# Patient Record
Sex: FEMALE | Race: WHITE | NOT HISPANIC OR LATINO | Employment: FULL TIME | URBAN - METROPOLITAN AREA
[De-identification: names, ages, dates, MRNs, and addresses within clinical notes are randomized per-mention and may not be internally consistent; named-entity substitution may affect disease eponyms.]

---

## 2017-07-19 ENCOUNTER — TRANSCRIBE ORDERS (OUTPATIENT)
Dept: ADMINISTRATIVE | Facility: HOSPITAL | Age: 27
End: 2017-07-19

## 2017-07-19 DIAGNOSIS — H90.3 SENSORY HEARING LOSS, BILATERAL: Primary | ICD-10-CM

## 2017-07-19 DIAGNOSIS — R42 DIZZINESS AND GIDDINESS: ICD-10-CM

## 2017-07-31 ENCOUNTER — HOSPITAL ENCOUNTER (OUTPATIENT)
Dept: RADIOLOGY | Facility: HOSPITAL | Age: 27
Discharge: HOME | End: 2017-07-31
Payer: COMMERCIAL

## 2017-07-31 DIAGNOSIS — R42 DIZZINESS AND GIDDINESS: ICD-10-CM

## 2017-07-31 DIAGNOSIS — H90.3 SENSORY HEARING LOSS, BILATERAL: ICD-10-CM

## 2017-07-31 PROCEDURE — A9585 GADOBUTROL INJECTION: HCPCS | Performed by: RADIOLOGY

## 2017-07-31 PROCEDURE — 70553 MRI BRAIN STEM W/O & W/DYE: CPT

## 2017-07-31 RX ADMIN — GADOBUTROL 10 ML: 604.72 INJECTION INTRAVENOUS at 20:00

## 2018-07-18 ENCOUNTER — HOSPITAL ENCOUNTER (EMERGENCY)
Facility: HOSPITAL | Age: 28
Discharge: HOME | End: 2018-07-18
Attending: EMERGENCY MEDICINE | Admitting: EMERGENCY MEDICINE
Payer: COMMERCIAL

## 2018-07-18 VITALS
TEMPERATURE: 98.5 F | SYSTOLIC BLOOD PRESSURE: 144 MMHG | RESPIRATION RATE: 17 BRPM | BODY MASS INDEX: 34.06 KG/M2 | DIASTOLIC BLOOD PRESSURE: 70 MMHG | WEIGHT: 217 LBS | OXYGEN SATURATION: 99 % | HEIGHT: 67 IN | HEART RATE: 81 BPM

## 2018-07-18 DIAGNOSIS — N75.1 BARTHOLIN'S GLAND ABSCESS: Primary | ICD-10-CM

## 2018-07-18 PROCEDURE — 99282 EMERGENCY DEPT VISIT SF MDM: CPT

## 2018-07-18 RX ORDER — CEPHALEXIN 250 MG/1
500 CAPSULE ORAL ONCE
Status: COMPLETED | OUTPATIENT
Start: 2018-07-18 | End: 2018-07-18

## 2018-07-18 RX ORDER — LIDOCAINE HYDROCHLORIDE 10 MG/ML
10 INJECTION, SOLUTION EPIDURAL; INFILTRATION; INTRACAUDAL; PERINEURAL ONCE
Status: DISCONTINUED | OUTPATIENT
Start: 2018-07-18 | End: 2018-07-18

## 2018-07-18 RX ORDER — OXYCODONE HYDROCHLORIDE AND ACETAMINOPHEN 5; 325 MG/1; MG/1
1 TABLET ORAL ONCE
Status: COMPLETED | OUTPATIENT
Start: 2018-07-18 | End: 2018-07-18

## 2018-07-18 RX ORDER — ONDANSETRON 4 MG/1
4 TABLET, ORALLY DISINTEGRATING ORAL ONCE
Status: COMPLETED | OUTPATIENT
Start: 2018-07-18 | End: 2018-07-18

## 2018-07-18 RX ORDER — CEPHALEXIN 500 MG/1
500 CAPSULE ORAL 4 TIMES DAILY
Qty: 40 CAPSULE | Refills: 0 | Status: SHIPPED | OUTPATIENT
Start: 2018-07-18 | End: 2018-07-28

## 2018-07-18 RX ORDER — OXYCODONE HYDROCHLORIDE AND ACETAMINOPHEN 5; 325 MG/1; MG/1
TABLET ORAL
Qty: 10 TABLET | Refills: 0 | Status: SHIPPED | OUTPATIENT
Start: 2018-07-18 | End: 2019-02-20 | Stop reason: ALTCHOICE

## 2018-07-18 RX ORDER — LIDOCAINE HYDROCHLORIDE AND EPINEPHRINE 10; 10 MG/ML; UG/ML
10 INJECTION, SOLUTION INFILTRATION; PERINEURAL ONCE
Status: COMPLETED | OUTPATIENT
Start: 2018-07-18 | End: 2018-07-18

## 2018-07-18 RX ADMIN — CEPHALEXIN 500 MG: 250 CAPSULE ORAL at 20:08

## 2018-07-18 RX ADMIN — LIDOCAINE HYDROCHLORIDE AND EPINEPHRINE 10 ML: 10; 10 INJECTION, SOLUTION INFILTRATION; PERINEURAL at 21:15

## 2018-07-18 RX ADMIN — OXYCODONE HYDROCHLORIDE AND ACETAMINOPHEN 1 TABLET: 5; 325 TABLET ORAL at 20:07

## 2018-07-18 RX ADMIN — ONDANSETRON 4 MG: 4 TABLET, ORALLY DISINTEGRATING ORAL at 20:08

## 2018-07-19 NOTE — ED NOTES
Discharged with instructions  Verbalized understanding  No distress at this time       Jerlean Burkitt, RN  07/18/18 8459

## 2018-07-19 NOTE — ED PROVIDER NOTES
History  Chief Complaint   Patient presents with    Abscess     swelling and abscess on left side of vagina  no drainage according to patient  This is a 41-year-old female patient who does have a history of a Bartholin's abscess that had an incision and drainage several years ago by her gynecologist the left side  For the last 3 days she has had increased pain and swelling over her left labia to the point now that she can tolerate the pain  She stated that earlier today she had chills but not sure of was due to the pain  She is currently on her menses  Nothing makes it better or worse she tried calling her gynecologist was given appointment for Tuesday but the pain is too severe  Patient has no other symptoms no difficulty voiding no burning with urination or vaginal discharge  None       No past medical history on file  No past surgical history on file  No family history on file  I have reviewed and agree with the history as documented  Social History   Substance Use Topics    Smoking status: Not on file    Smokeless tobacco: Not on file    Alcohol use Not on file        Review of Systems   All other systems reviewed and are negative  Physical Exam  Physical Exam   Constitutional: She appears well-developed and well-nourished  HENT:   Head: Normocephalic and atraumatic  Right Ear: External ear normal    Left Ear: External ear normal    Nose: Nose normal    Mouth/Throat: Oropharynx is clear and moist    Eyes: Conjunctivae are normal  Pupils are equal, round, and reactive to light  Neck: Normal range of motion  Neck supple  Cardiovascular: Normal rate and regular rhythm  Pulmonary/Chest: Effort normal and breath sounds normal    Abdominal: Soft  Bowel sounds are normal  There is no tenderness  Genitourinary:       Pelvic exam was performed with patient prone  Neurological: She is alert  Skin: Skin is warm  Psychiatric: She has a normal mood and affect   Her behavior is normal    Nursing note and vitals reviewed  Vital Signs  ED Triage Vitals [07/18/18 1943]   Temperature Pulse Respirations Blood Pressure SpO2   98 5 °F (36 9 °C) 81 17 144/70 99 %      Temp Source Heart Rate Source Patient Position - Orthostatic VS BP Location FiO2 (%)   Oral Monitor Sitting Left arm --      Pain Score       9           Vitals:    07/18/18 1943   BP: 144/70   Pulse: 81   Patient Position - Orthostatic VS: Sitting       Visual Acuity      ED Medications  Medications   lidocaine-epinephrine (XYLOCAINE/EPINEPHRINE) 1 %-1:100,000 injection 10 mL (not administered)   oxyCODONE-acetaminophen (PERCOCET) 5-325 mg per tablet 1 tablet (1 tablet Oral Given 7/18/18 2007)   cephalexin (KEFLEX) capsule 500 mg (500 mg Oral Given 7/18/18 2008)   ondansetron (ZOFRAN-ODT) dispersible tablet 4 mg (4 mg Oral Given 7/18/18 2008)       Diagnostic Studies  Results Reviewed     None                 No orders to display              Procedures  Procedures       Phone Contacts  ED Phone Contact    ED Course                               MDM  CritCare Time    Disposition  Final diagnoses:   Bartholin's gland abscess     Time reflects when diagnosis was documented in both MDM as applicable and the Disposition within this note     Time User Action Codes Description Comment    7/18/2018  9:07 PM [address] [[address]] Bartholin's gland abscess       ED Disposition     ED Disposition Condition Comment    Discharge  [address] discharge to home/self care  Condition at discharge: Good        Follow-up Information     Follow up With Specialties Details Why Contact Info       Follow-up with her gynecologist this week             Patient's Medications   Discharge Prescriptions    CEPHALEXIN (KEFLEX) 500 MG CAPSULE    Take 1 capsule (500 mg total) by mouth 4 (four) times a day for 10 days       Start Date: 7/18/2018 End Date: 7/28/2018       Order Dose: 500 mg       Quantity: 40 capsule    Refills: 0 OXYCODONE-ACETAMINOPHEN (PERCOCET) 5-325 MG PER TABLET    1 po q 6hrs prn initial therapy       Start Date: 7/18/2018 End Date: --       Order Dose: --       Quantity: 10 tablet    Refills: 0     No discharge procedures on file      ED Provider  Electronically Signed by           Darby Torres PA-C  07/18/18 4891

## 2018-07-19 NOTE — DISCHARGE INSTRUCTIONS
Bartholin Cyst   WHAT YOU NEED TO KNOW:   A Bartholin cyst is a lump near the opening to your vagina  You may have pain in this area when you walk or have sex  A Bartholin cyst is caused by blockage of your Bartholin gland  You have a Bartholin gland on each side of your vagina  The glands produce fluid to moisten your vagina  Over time the fluid can build up in the gland and form a cyst  The cyst may become infected  You may be at risk for a Bartholin cyst if you have a sexually transmitted infection  An injury or surgery near your vagina may also increase you risk  DISCHARGE INSTRUCTIONS:   Contact your gynecologist or healthcare provider if:   · You have a fever  · Your cyst gets larger or becomes more painful  · Your cyst returns after treatment  · Your drain falls out  · You have pus, redness, or swelling where the cyst was drained  · You have questions or concerns about your condition or care  Medicines: You may need any of the following:  · Antibiotics  help prevent or treat a bacterial infection  · NSAIDs , such as ibuprofen, help decrease swelling, pain, and fever  NSAIDs can cause stomach bleeding or kidney problems in certain people  If you take blood thinner medicine, always ask your healthcare provider if NSAIDs are safe for you  Always read the medicine label and follow directions  · Take your medicine as directed  Contact your healthcare provider if you think your medicine is not helping or if you have side effects  Tell him of her if you are allergic to any medicine  Keep a list of the medicines, vitamins, and herbs you take  Include the amounts, and when and why you take them  Bring the list or the pill bottles to follow-up visits  Carry your medicine list with you in case of an emergency  Self-care if your cyst was not drained:   · Take a sitz bath  3 to 4 times each day or as directed  A sitz bath may help relieve swelling and pain   It will also help open your Bartholin glands so they drain normally  Place a clean towel on the bottom of your bath tub  Fill your bath tub with warm water up to your hips  You can also buy a sitz bath that fits in your toilet  Sit in the water for 10 minutes  · Apply a warm compress  to your cyst  This may relieve swelling and pain  A warm compress will also help open your Bartholin glands so they drain normally  Wet a washcloth in warm, but not hot, water  Apply the compress for 10 minutes  Repeat 4 times each day  · Keep the area around your vagina clean  Always wipe front to back  Shower once a day  Gently pat the area dry after a shower  Self-care after an incision and drainage of your cyst:   · Take a sitz bath  in 24 to 48 hours or as directed  You may need to wait to take a sitz bath until after your packing is removed  A sitz bath may help relieve swelling and pain  Take a sitz bath 3 to 4 times each day for 3 days  Place a clean towel on the bottom of your bath tub  Fill your bath tub with warm water up to your hips  You can also buy a sitz bath that fits in your toilet  Sit in the water for 10 minutes  · Wear a sanitary pad  to absorb drainage from your wound  You may have drainage for a few weeks after your cyst is drained  · Ask your healthcare provider if it is okay to have sex  Sex may cause your drain to fall out  It may also increase your risk for an infection  · Keep the area around your vagina clean  Always wipe front to back  Shower once a day  Gently pat the area dry after a shower  Follow up with your healthcare provider as directed: If packing was placed in your wound, return in 24 to 48 hours to have it removed  If a drain was placed, you will need to return in 4 to 6 weeks to have it removed  Write down your questions so you remember to ask them during your visits     © 2017 [employer] Information is for End User's use only and may not be sold, redistributed or otherwise used for commercial purposes  All illustrations and images included in CareNotes® are the copyrighted property of [employer]  or [employer]  The above information is an  only  It is not intended as medical advice for individual conditions or treatments  Talk to your doctor, nurse or pharmacist before following any medical regimen to see if it is safe and effective for you  Incision and Drainage   WHAT YOU NEED TO KNOW:   Incision and drainage (I & D) is a procedure to drain a pocket of fluid, such as pus or blood  DISCHARGE INSTRUCTIONS:   Medicines: You may need any of the following:  · NSAIDs , such as ibuprofen, help decrease swelling, pain, and fever  This medicine is available with or without a doctor's order  NSAIDs can cause stomach bleeding or kidney problems in certain people  If you take blood thinner medicine, always ask if NSAIDs are safe for you  Always read the medicine label and follow directions  Do not give these medicines to children under 10months of age without direction from your child's healthcare provider  · Prescription pain medication  may be given to decrease pain  Do not wait until the pain is severe before you take your medicine  Your healthcare provider may ask you to take pain medicine ½ hour before your follow-up visit for wound care  · Take your medicine as directed  Contact your healthcare provider if you think your medicine is not helping or if you have side effects  Tell him if you are allergic to any medicine  Keep a list of the medicines, vitamins, and herbs you take  Include the amounts, and when and why you take them  Bring the list or the pill bottles to follow-up visits  Carry your medicine list with you in case of an emergency  Follow up with your healthcare provider in 1 to 3 days, or as directed: You may need to return to have your incision cleaned and your bandages changed   Your healthcare provider will make sure your wound is healing well  Ask how to care for your wound at home  Bring a list of your questions so you remember to ask them during your visits  Wound care:  Keep your wound clean and dry as directed by your healthcare provider:  · Wash your hands  before and after you touch or clean your wound  · Flush or soak your wound  as directed  · Check your wound  for signs of infection, such as redness, swelling, and pain  · Change the packing or bandages  as directed  Ask for more information about what type of bandages to use  Elevate your wound: If your wound is on your arm or leg, keep it raised above the level of your heart as often as you can  This will help decrease swelling and pain  Prop your arm or leg on pillows or blankets to keep it elevated comfortably  Wear a splint as directed: You may need to wear a splint if your wound is on your hand, arm, or leg  A splint limits movement and helps your wound heal  Do not remove the splint until your healthcare provider says it is okay  Contact your healthcare provider if:   · You have fever or chills  · You feel more tired than usual     · Fluid builds up again and creates a pocket in the same area  · Your wound becomes red, swollen, and painful  · You have questions or concerns about your condition or care  Return to the emergency department if:   · You have red streaks or extreme pain near your wound  · Blood soaks through your bandage  · You have pain in your back, stomach, muscles, or joints  © 2017 [employer]  Information is for End User's use only and may not be sold, redistributed or otherwise used for commercial purposes  All illustrations and images included in CareNotes® are the copyrighted property of [employer]  or [employer]  The above information is an  only  It is not intended as medical advice for individual conditions or treatments   Talk to your doctor, nurse or pharmacist before following any medical regimen to see if it is safe and effective for you

## 2018-07-19 NOTE — ED PROVIDER NOTES
History  Chief Complaint   Patient presents with    Abscess     swelling and abscess on left side of vagina  no drainage according to patient  HPI    None       No past medical history on file  No past surgical history on file  No family history on file  I have reviewed and agree with the history as documented      Social History   Substance Use Topics    Smoking status: Not on file    Smokeless tobacco: Not on file    Alcohol use Not on file        Review of Systems    Physical Exam  Physical Exam    Vital Signs  ED Triage Vitals [07/18/18 1943]   Temperature Pulse Respirations Blood Pressure SpO2   98 5 °F (36 9 °C) 81 17 144/70 99 %      Temp Source Heart Rate Source Patient Position - Orthostatic VS BP Location FiO2 (%)   Oral Monitor Sitting Left arm --      Pain Score       9           Vitals:    07/18/18 1943   BP: 144/70   Pulse: 81   Patient Position - Orthostatic VS: Sitting       Visual Acuity      ED Medications  Medications   oxyCODONE-acetaminophen (PERCOCET) 5-325 mg per tablet 1 tablet (1 tablet Oral Given 7/18/18 2007)   cephalexin (KEFLEX) capsule 500 mg (500 mg Oral Given 7/18/18 2008)   ondansetron (ZOFRAN-ODT) dispersible tablet 4 mg (4 mg Oral Given 7/18/18 2008)   lidocaine-epinephrine (XYLOCAINE/EPINEPHRINE) 1 %-1:100,000 injection 10 mL (10 mL Infiltration Given 7/18/18 2115)       Diagnostic Studies  Results Reviewed     None                 No orders to display              Procedures  Incision/Drainage  Date/Time: 7/18/2018 9:07 PM  Performed by: Zaida Marvin  Authorized by: Zaida Marvin     Patient location:  ED  Other Assisting Provider: Yes (comment) ([facility])    Consent:     Consent obtained:  Verbal and written    Consent given by:  Patient  Universal protocol:     Procedure explained and questions answered to patient or proxy's satisfaction: yes      Patient identity confirmed:  Verbally with patient  Location:     Type:  Abscess and Bartholin cyst    Size:  5    Location:  Anogenital    Anogenital location:  Bartholin's gland  Pre-procedure details:     Skin preparation:  Betadine  Anesthesia (see MAR for exact dosages): Anesthesia method:  Local infiltration    Local anesthetic:  Lidocaine 1% WITH epi  Procedure details:     Complexity:  Intermediate    Incision types:  Stab incision    Scalpel blade:  11    Approach:  Open    Incision depth:  Submucosal    Wound management:  Irrigated with saline and extensive cleaning    Drainage:  Bloody and purulent    Drainage amount:  Copious    Wound treatment:  Drain placed    Packing materials:  Balloon  Post-procedure details:     Patient tolerance of procedure: Tolerated well, no immediate complications             Phone Contacts  ED Phone Contact    ED Course                               MDM  CritCare Time    Disposition  Final diagnoses:   Bartholin's gland abscess     Time reflects when diagnosis was documented in both MDM as applicable and the Disposition within this note     Time User Action Codes Description Comment    7/18/2018  9:07 PM [place] [address] [[address]] Bartholin's gland abscess       ED Disposition     ED Disposition Condition Comment    Discharge  [address] discharge to home/self care  Condition at discharge: Good        Follow-up Information     Follow up With Specialties Details Why Contact Info       Follow-up with her gynecologist this week  Discharge Medication List as of 7/18/2018  9:09 PM      START taking these medications    Details   cephalexin (KEFLEX) 500 mg capsule Take 1 capsule (500 mg total) by mouth 4 (four) times a day for 10 days, Starting Wed 7/18/2018, Until Sat 7/28/2018, Print      oxyCODONE-acetaminophen (PERCOCET) 5-325 mg per tablet 1 po q 6hrs prn initial therapy, Print           No discharge procedures on file      ED Provider  Electronically Signed by           Emanuel Garcia MD  07/19/18 0415

## 2018-07-20 NOTE — ED ATTENDING ATTESTATION
Johnathan Weaver MD, saw and evaluated the patient  I have discussed the patient with the resident/non-physician practitioner and agree with the resident's/non-physician practitioner's findings, Plan of Care, and MDM as documented in the resident's/non-physician practitioner's note, except where noted  All available labs and Radiology studies were reviewed  At this point I agree with the current assessment done in the Emergency Department  I have conducted an independent evaluation of this patient a history and physical is as follows:    34 yo F healthy at baseline w/ hx of prior Bartholin's abscess a few years ago now w/ worsening L Bartholin's / labial swelling X3d  No inciting event  No drainage  Had chills today though no fevers or other systemic symptoms  Has appt  To see her GYN on Monday  On exam pt  W  Notable L Bartholin's abscess extending to the entire L labia minora  No appearance of cellulitis  No tender inguinal nodes  Consented pt  For I&D  Procedure well tolerated  For GYN F/U        Critical Care Time  CritCare Time    Procedures

## 2018-10-01 ENCOUNTER — OFFICE VISIT (OUTPATIENT)
Dept: URGENT CARE | Facility: MEDICAL CENTER | Age: 28
End: 2018-10-01
Payer: COMMERCIAL

## 2018-10-01 VITALS
WEIGHT: 219 LBS | BODY MASS INDEX: 34.37 KG/M2 | DIASTOLIC BLOOD PRESSURE: 83 MMHG | TEMPERATURE: 98 F | OXYGEN SATURATION: 97 % | SYSTOLIC BLOOD PRESSURE: 120 MMHG | HEIGHT: 67 IN | HEART RATE: 86 BPM

## 2018-10-01 DIAGNOSIS — J06.9 ACUTE URI: Primary | ICD-10-CM

## 2018-10-01 PROCEDURE — 99213 OFFICE O/P EST LOW 20 MIN: CPT | Performed by: FAMILY MEDICINE

## 2018-10-01 NOTE — PROGRESS NOTES
[facility]        NAME: Aurda Garsia is a 33 y o  female  : 1990    MRN: [id number]      Assessment and Plan   Acute URI [J06 9]  1  Acute URI           Patient Instructions       Follow up with PCP in 3-5 days  Proceed to  ER if symptoms worsen  Chief Complaint     Chief Complaint   Patient presents with    Facial Pain     x2days         History of Present Illness       URI    This is a new problem  The current episode started yesterday  The problem has been unchanged  There has been no fever  Associated symptoms include congestion, ear pain, a plugged ear sensation, rhinorrhea and sinus pain  Pertinent negatives include no coughing, diarrhea, headaches, joint swelling, nausea, sore throat, swollen glands or wheezing  She has tried nothing for the symptoms  Review of Systems   Review of Systems   HENT: Positive for congestion, ear pain, rhinorrhea and sinus pain  Negative for sore throat  Respiratory: Negative for cough and wheezing  Gastrointestinal: Negative for diarrhea and nausea  Neurological: Negative for headaches  Current Medications       Current Outpatient Prescriptions:     oxyCODONE-acetaminophen (PERCOCET) 5-325 mg per tablet, 1 po q 6hrs prn initial therapy (Patient not taking: Reported on 10/1/2018 ), Disp: 10 tablet, Rfl: 0    Current Allergies     Allergies as of 10/01/2018    (No Known Allergies)            The following portions of the patient's history were reviewed and updated as appropriate: allergies, current medications, past family history, past medical history, past social history, past surgical history and problem list      No past medical history on file  No past surgical history on file  No family history on file  Medications have been verified          Objective   /83 (BP Location: Right arm, Patient Position: Sitting)   Pulse 86   Temp 98 °F (36 7 °C) (Temporal)   Ht 5' 7" (1 702 m)   Wt 99 3 kg (219 lb)   SpO2 97%   BMI 34 30 kg/m²        Physical Exam     Physical Exam   Constitutional: She is oriented to person, place, and time  She appears well-developed and well-nourished  HENT:   Right Ear: Tympanic membrane and external ear normal    Left Ear: Tympanic membrane and external ear normal    Nose: Rhinorrhea present  Right sinus exhibits maxillary sinus tenderness  Left sinus exhibits maxillary sinus tenderness  Mouth/Throat: Uvula is midline and mucous membranes are normal  No oropharyngeal exudate, posterior oropharyngeal edema, posterior oropharyngeal erythema or tonsillar abscesses  Neck: Normal range of motion  No edema present  Cardiovascular: Normal rate, regular rhythm, S1 normal, S2 normal and normal heart sounds  No murmur heard  Pulmonary/Chest: Effort normal and breath sounds normal  No respiratory distress  She has no wheezes  She has no rales  She exhibits no tenderness  Lymphadenopathy:     She has no cervical adenopathy  Neurological: She is alert and oriented to person, place, and time  Skin: Skin is warm, dry and intact  No rash noted  Psychiatric: She has a normal mood and affect  Her speech is normal and behavior is normal    Nursing note and vitals reviewed

## 2019-02-20 ENCOUNTER — HOSPITAL ENCOUNTER (EMERGENCY)
Facility: HOSPITAL | Age: 29
Discharge: HOME | End: 2019-02-20
Attending: EMERGENCY MEDICINE
Payer: COMMERCIAL

## 2019-02-20 ENCOUNTER — APPOINTMENT (EMERGENCY)
Dept: CT IMAGING | Facility: HOSPITAL | Age: 29
End: 2019-02-20
Payer: COMMERCIAL

## 2019-02-20 VITALS
WEIGHT: 230 LBS | TEMPERATURE: 98.1 F | HEART RATE: 107 BPM | BODY MASS INDEX: 36.02 KG/M2 | RESPIRATION RATE: 16 BRPM | DIASTOLIC BLOOD PRESSURE: 90 MMHG | SYSTOLIC BLOOD PRESSURE: 143 MMHG | OXYGEN SATURATION: 99 %

## 2019-02-20 DIAGNOSIS — K76.9 LESION OF LIVER: ICD-10-CM
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
## 2019-02-20 DIAGNOSIS — S30.1XXA ABDOMINAL WALL HEMATOMA, INITIAL ENCOUNTER: ICD-10-CM

## 2019-02-20 DIAGNOSIS — S16.1XXA STRAIN OF NECK MUSCLE, INITIAL ENCOUNTER: ICD-10-CM

## 2019-02-20 LAB
ABO GROUP BLD: NORMAL
ALBUMIN SERPL BCP-MCNC: 4.4 G/DL (ref 3.5–5)
ALP SERPL-CCNC: 70 U/L (ref 46–116)
ALT SERPL W P-5'-P-CCNC: 55 U/L (ref 12–78)
ANION GAP SERPL CALCULATED.3IONS-SCNC: 11 MMOL/L (ref 4–13)
AST SERPL W P-5'-P-CCNC: 56 U/L (ref 5–45)
BACTERIA UR QL AUTO: ABNORMAL /HPF
BASOPHILS # BLD AUTO: 0.05 THOUSANDS/ΜL (ref 0–0.1)
BASOPHILS NFR BLD AUTO: 0 % (ref 0–1)
BILIRUB SERPL-MCNC: 0.5 MG/DL (ref 0.2–1)
BILIRUB UR QL STRIP: NEGATIVE
BLD GP AB SCN SERPL QL: NEGATIVE
BUN SERPL-MCNC: 12 MG/DL (ref 5–25)
CALCIUM SERPL-MCNC: 10.3 MG/DL (ref 8.3–10.1)
CHLORIDE SERPL-SCNC: 101 MMOL/L (ref 100–108)
CLARITY UR: CLEAR
CO2 SERPL-SCNC: 27 MMOL/L (ref 21–32)
COLOR UR: YELLOW
CREAT SERPL-MCNC: 0.82 MG/DL (ref 0.6–1.3)
EOSINOPHIL # BLD AUTO: 0.07 THOUSAND/ΜL (ref 0–0.61)
EOSINOPHIL NFR BLD AUTO: 1 % (ref 0–6)
ERYTHROCYTE [DISTWIDTH] IN BLOOD BY AUTOMATED COUNT: 11.8 % (ref 11.6–15.1)
EXT PREG TEST URINE: NEGATIVE
GFR SERPL CREATININE-BSD FRML MDRD: 98 ML/MIN/1.73SQ M
GLUCOSE SERPL-MCNC: 98 MG/DL (ref 65–140)
GLUCOSE UR STRIP-MCNC: NEGATIVE MG/DL
HCT VFR BLD AUTO: 45.7 % (ref 34.8–46.1)
HGB BLD-MCNC: 15.9 G/DL (ref 11.5–15.4)
HGB UR QL STRIP.AUTO: ABNORMAL
IMM GRANULOCYTES # BLD AUTO: 0.06 THOUSAND/UL (ref 0–0.2)
IMM GRANULOCYTES NFR BLD AUTO: 0 % (ref 0–2)
KETONES UR STRIP-MCNC: NEGATIVE MG/DL
LEUKOCYTE ESTERASE UR QL STRIP: NEGATIVE
LYMPHOCYTES # BLD AUTO: 1.74 THOUSANDS/ΜL (ref 0.6–4.47)
LYMPHOCYTES NFR BLD AUTO: 11 % (ref 14–44)
MCH RBC QN AUTO: 31.6 PG (ref 26.8–34.3)
MCHC RBC AUTO-ENTMCNC: 34.8 G/DL (ref 31.4–37.4)
MCV RBC AUTO: 91 FL (ref 82–98)
MONOCYTES # BLD AUTO: 0.86 THOUSAND/ΜL (ref 0.17–1.22)
MONOCYTES NFR BLD AUTO: 6 % (ref 4–12)
NEUTROPHILS # BLD AUTO: 12.42 THOUSANDS/ΜL (ref 1.85–7.62)
NEUTS SEG NFR BLD AUTO: 82 % (ref 43–75)
NITRITE UR QL STRIP: NEGATIVE
NON-SQ EPI CELLS URNS QL MICRO: ABNORMAL /HPF
NRBC BLD AUTO-RTO: 0 /100 WBCS
PH UR STRIP.AUTO: 6 [PH] (ref 4.5–8)
PLATELET # BLD AUTO: 249 THOUSANDS/UL (ref 149–390)
PMV BLD AUTO: 9.8 FL (ref 8.9–12.7)
POTASSIUM SERPL-SCNC: 4.7 MMOL/L (ref 3.5–5.3)
PROT SERPL-MCNC: 8.5 G/DL (ref 6.4–8.2)
PROT UR STRIP-MCNC: NEGATIVE MG/DL
RBC # BLD AUTO: 5.03 MILLION/UL (ref 3.81–5.12)
RBC #/AREA URNS AUTO: ABNORMAL /HPF
RH BLD: NEGATIVE
SODIUM SERPL-SCNC: 139 MMOL/L (ref 136–145)
SP GR UR STRIP.AUTO: 1.01 (ref 1–1.03)
SPECIMEN EXPIRATION DATE: NORMAL
UROBILINOGEN UR QL STRIP.AUTO: 0.2 E.U./DL
WBC # BLD AUTO: 15.2 THOUSAND/UL (ref 4.31–10.16)
WBC #/AREA URNS AUTO: ABNORMAL /HPF

## 2019-02-20 PROCEDURE — 81001 URINALYSIS AUTO W/SCOPE: CPT

## 2019-02-20 PROCEDURE — 80053 COMPREHEN METABOLIC PANEL: CPT | Performed by: PHYSICIAN ASSISTANT

## 2019-02-20 PROCEDURE — 86850 RBC ANTIBODY SCREEN: CPT | Performed by: PHYSICIAN ASSISTANT

## 2019-02-20 PROCEDURE — 96361 HYDRATE IV INFUSION ADD-ON: CPT

## 2019-02-20 PROCEDURE — 99284 EMERGENCY DEPT VISIT MOD MDM: CPT

## 2019-02-20 PROCEDURE — 71260 CT THORAX DX C+: CPT

## 2019-02-20 PROCEDURE — 96375 TX/PRO/DX INJ NEW DRUG ADDON: CPT

## 2019-02-20 PROCEDURE — 74177 CT ABD & PELVIS W/CONTRAST: CPT

## 2019-02-20 PROCEDURE — 85025 COMPLETE CBC W/AUTO DIFF WBC: CPT | Performed by: PHYSICIAN ASSISTANT

## 2019-02-20 PROCEDURE — 96374 THER/PROPH/DIAG INJ IV PUSH: CPT

## 2019-02-20 PROCEDURE — 86900 BLOOD TYPING SEROLOGIC ABO: CPT | Performed by: PHYSICIAN ASSISTANT

## 2019-02-20 PROCEDURE — 86901 BLOOD TYPING SEROLOGIC RH(D): CPT | Performed by: PHYSICIAN ASSISTANT

## 2019-02-20 PROCEDURE — 36415 COLL VENOUS BLD VENIPUNCTURE: CPT | Performed by: PHYSICIAN ASSISTANT

## 2019-02-20 PROCEDURE — 81025 URINE PREGNANCY TEST: CPT | Performed by: PHYSICIAN ASSISTANT

## 2019-02-20 PROCEDURE — 70450 CT HEAD/BRAIN W/O DYE: CPT

## 2019-02-20 PROCEDURE — 72125 CT NECK SPINE W/O DYE: CPT

## 2019-02-20 PROCEDURE — 81003 URINALYSIS AUTO W/O SCOPE: CPT

## 2019-02-20 RX ORDER — ONDANSETRON 4 MG/1
4 TABLET, ORALLY DISINTEGRATING ORAL ONCE
Status: COMPLETED | OUTPATIENT
Start: 2019-02-20 | End: 2019-02-20

## 2019-02-20 RX ORDER — ONDANSETRON 2 MG/ML
4 INJECTION INTRAMUSCULAR; INTRAVENOUS ONCE
Status: COMPLETED | OUTPATIENT
Start: 2019-02-20 | End: 2019-02-20

## 2019-02-20 RX ORDER — MORPHINE SULFATE 10 MG/ML
6 INJECTION, SOLUTION INTRAMUSCULAR; INTRAVENOUS ONCE
Status: COMPLETED | OUTPATIENT
Start: 2019-02-20 | End: 2019-02-20

## 2019-02-20 RX ORDER — TRAMADOL HYDROCHLORIDE 50 MG/1
50 TABLET ORAL EVERY 6 HOURS PRN
Qty: 12 TABLET | Refills: 0 | Status: SHIPPED | OUTPATIENT
Start: 2019-02-20 | End: 2019-03-02

## 2019-02-20 RX ADMIN — ONDANSETRON 4 MG: 4 TABLET, ORALLY DISINTEGRATING ORAL at 22:12

## 2019-02-20 RX ADMIN — MORPHINE SULFATE 6 MG: 10 INJECTION INTRAVENOUS at 21:38

## 2019-02-20 RX ADMIN — SODIUM CHLORIDE 1000 ML: 0.9 INJECTION, SOLUTION INTRAVENOUS at 21:33

## 2019-02-20 RX ADMIN — ONDANSETRON 4 MG: 2 INJECTION INTRAMUSCULAR; INTRAVENOUS at 21:34

## 2019-02-20 RX ADMIN — IOHEXOL 100 ML: 350 INJECTION, SOLUTION INTRAVENOUS at 21:24
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
## 2019-02-25 ENCOUNTER — OFFICE VISIT (OUTPATIENT)
Dept: FAMILY MEDICINE CLINIC | Facility: CLINIC | Age: 29
End: 2019-02-25
Payer: COMMERCIAL

## 2019-02-25 VITALS
DIASTOLIC BLOOD PRESSURE: 88 MMHG | HEIGHT: 67 IN | HEART RATE: 88 BPM | BODY MASS INDEX: 36.82 KG/M2 | TEMPERATURE: 98.8 F | WEIGHT: 234.6 LBS | SYSTOLIC BLOOD PRESSURE: 130 MMHG

## 2019-02-25 DIAGNOSIS — R42 DIZZINESS: Primary | ICD-10-CM

## 2019-02-25 DIAGNOSIS — Z76.89 ENCOUNTER TO ESTABLISH CARE: ICD-10-CM

## 2019-02-25 DIAGNOSIS — R53.83 OTHER FATIGUE: ICD-10-CM

## 2019-02-25 DIAGNOSIS — K76.89 LIVER CYST: ICD-10-CM

## 2019-02-25 DIAGNOSIS — R51.9 SINUS HEADACHE: ICD-10-CM

## 2019-02-25 PROCEDURE — 1036F TOBACCO NON-USER: CPT | Performed by: NURSE PRACTITIONER

## 2019-02-25 PROCEDURE — 3008F BODY MASS INDEX DOCD: CPT | Performed by: NURSE PRACTITIONER

## 2019-02-25 PROCEDURE — 99203 OFFICE O/P NEW LOW 30 MIN: CPT | Performed by: NURSE PRACTITIONER

## 2019-02-25 RX ORDER — OMEPRAZOLE 40 MG/1
20 CAPSULE, DELAYED RELEASE ORAL DAILY
COMMUNITY
Start: 2017-09-09

## 2019-02-25 RX ORDER — FLUTICASONE PROPIONATE 50 MCG
1 SPRAY, SUSPENSION (ML) NASAL DAILY
Qty: 1 BOTTLE | Refills: 4 | Status: SHIPPED | OUTPATIENT
Start: 2019-02-25 | End: 2019-07-28 | Stop reason: SDUPTHER

## 2019-02-25 RX ORDER — MECLIZINE HYDROCHLORIDE 25 MG/1
25 TABLET ORAL 3 TIMES DAILY PRN
Qty: 30 TABLET | Refills: 4 | Status: SHIPPED | OUTPATIENT
Start: 2019-02-25 | End: 2020-04-22 | Stop reason: SDUPTHER

## 2019-02-25 RX ORDER — FEXOFENADINE HYDROCHLORIDE 60 MG/1
60 TABLET, FILM COATED ORAL DAILY
Qty: 30 TABLET | Refills: 4 | Status: SHIPPED | OUTPATIENT
Start: 2019-02-25 | End: 2019-08-05 | Stop reason: ALTCHOICE
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
## 2019-06-12 ENCOUNTER — TELEPHONE (OUTPATIENT)
Dept: OBGYN CLINIC | Facility: MEDICAL CENTER | Age: 29
End: 2019-06-12

## 2019-06-12 ENCOUNTER — OFFICE VISIT (OUTPATIENT)
Dept: OBGYN CLINIC | Facility: MEDICAL CENTER | Age: 29
End: 2019-06-12
Payer: COMMERCIAL

## 2019-06-12 VITALS
SYSTOLIC BLOOD PRESSURE: 136 MMHG | BODY MASS INDEX: 37.2 KG/M2 | DIASTOLIC BLOOD PRESSURE: 82 MMHG | WEIGHT: 237 LBS | HEIGHT: 67 IN

## 2019-06-12 DIAGNOSIS — N75.0 BARTHOLIN GLAND CYST: Primary | ICD-10-CM

## 2019-06-12 PROCEDURE — 56420 I&D BARTHOLINS GLAND ABSCESS: CPT | Performed by: PHYSICIAN ASSISTANT

## 2019-06-12 PROCEDURE — 99202 OFFICE O/P NEW SF 15 MIN: CPT | Performed by: PHYSICIAN ASSISTANT

## 2019-06-12 RX ORDER — FLUCONAZOLE 150 MG/1
TABLET ORAL
Qty: 2 TABLET | Refills: 4 | Status: SHIPPED | OUTPATIENT
Start: 2019-06-12 | End: 2019-06-15

## 2019-06-12 RX ORDER — CEPHALEXIN 250 MG/1
500 CAPSULE ORAL 2 TIMES DAILY
Qty: 28 CAPSULE | Refills: 0 | Status: SHIPPED | OUTPATIENT
Start: 2019-06-12 | End: 2019-06-19

## 2019-06-12 RX ORDER — CETIRIZINE HYDROCHLORIDE 10 MG/1
10 TABLET ORAL DAILY PRN
COMMUNITY

## 2019-07-28 DIAGNOSIS — R51.9 SINUS HEADACHE: ICD-10-CM

## 2019-07-29 RX ORDER — FLUTICASONE PROPIONATE 50 MCG
SPRAY, SUSPENSION (ML) NASAL
Qty: 16 ML | Refills: 4 | Status: SHIPPED | OUTPATIENT
Start: 2019-07-29 | End: 2020-04-22 | Stop reason: SDUPTHER

## 2019-08-05 ENCOUNTER — ANNUAL EXAM (OUTPATIENT)
Dept: OBGYN CLINIC | Facility: MEDICAL CENTER | Age: 29
End: 2019-08-05
Payer: COMMERCIAL

## 2019-08-05 VITALS
WEIGHT: 242 LBS | SYSTOLIC BLOOD PRESSURE: 110 MMHG | DIASTOLIC BLOOD PRESSURE: 70 MMHG | HEIGHT: 65 IN | BODY MASS INDEX: 40.32 KG/M2

## 2019-08-05 DIAGNOSIS — N92.6 IRREGULAR MENSTRUAL BLEEDING: ICD-10-CM

## 2019-08-05 DIAGNOSIS — Z87.42 HISTORY OF INFERTILITY: ICD-10-CM

## 2019-08-05 DIAGNOSIS — Z01.419 ENCNTR FOR GYN EXAM (GENERAL) (ROUTINE) W/O ABN FINDINGS: Primary | ICD-10-CM

## 2019-08-05 PROBLEM — E66.813 CLASS 3 SEVERE OBESITY IN ADULT (HCC): Status: ACTIVE | Noted: 2019-08-05

## 2019-08-05 PROBLEM — E66.01 CLASS 3 SEVERE OBESITY IN ADULT (HCC): Status: ACTIVE | Noted: 2019-08-05

## 2019-08-05 PROBLEM — N75.0 BARTHOLIN GLAND CYST: Status: RESOLVED | Noted: 2019-06-12 | Resolved: 2019-08-05

## 2019-08-05 PROCEDURE — G0145 SCR C/V CYTO,THINLAYER,RESCR: HCPCS | Performed by: OBSTETRICS & GYNECOLOGY

## 2019-08-05 PROCEDURE — 99395 PREV VISIT EST AGE 18-39: CPT | Performed by: OBSTETRICS & GYNECOLOGY

## 2019-08-05 NOTE — PROGRESS NOTES
Emile Zapien  1990    CC:  Yearly exam    S:  30 y o  female here for yearly exam  She is a new patient to our practice, was seen for Bartholin cyst about a month ago  She reports that she has not been using contraception for the last two years - she and her  are hoping for pregnancy  Since March she has been using a period tracker tariq - upon review her cycles are 29-42 days in length  Her cycles last 5-7d are are not too heavy or long  She denies any history of STIs - or pelvic infections, although does report prior issues with recurrent BV and also with + HPV for which she was getting frequent pap smears  Her  is overall healthy, does not have proven fertility  + smoker trying to quit  Her cycles are regular, not heavy or crampy  She does not request STD testing today  She has occasional deep dyspareunia  Last pelvic US she had done was in ~ 2011  She also has occasional sharp pain around the time of ovulation  She has no vaginal discharge  She [facility]rog[facility] at [facility] in 4/2017 for pelvic floor dysfunction -? IC           Current Outpatient Medications:     cetirizine (ZyrTEC) 10 mg tablet, Take 10 mg by mouth daily, Disp: , Rfl:     fluticasone (FLONASE) 50 mcg/act nasal spray, SPRAY 1 SPRAY INTO EACH NOSTRIL EVERY DAY, Disp: 16 mL, Rfl: 4    meclizine (ANTIVERT) 25 mg tablet, Take 1 tablet (25 mg total) by mouth 3 (three) times a day as needed for dizziness, Disp: 30 tablet, Rfl: 4    Multiple Vitamins-Minerals (MULTIVITAL-M PO), Take by mouth daily, Disp: , Rfl:     omeprazole (PriLOSEC) 40 MG capsule, TAKE 1 CAPSULE EVERY DAY, Disp: , Rfl:   Past Medical History:   Diagnosis Date    Abnormal Pap smear of cervix     GERD (gastroesophageal reflux disease)     HPV in female     Obesity     Varicella      Past Surgical History:   Procedure Laterality Date    WISDOM TOOTH EXTRACTION       Family History   Problem Relation Age of Onset    Coronary artery disease Family     Diabetes Family     Drug abuse Mother     Hypertension Father     Diabetes Father     Gestational diabetes Sister     No Known Problems Brother     No Known Problems Brother     No Known Problems Sister     No Known Problems Sister        O:  Blood pressure 110/70, height 5' 5" (1 651 m), weight 110 kg (242 lb), last menstrual period 07/22/2019, not currently breastfeeding  Patient appears well and is not in distress  Breasts are symmetrical without mass, tenderness, nipple discharge, skin changes or adenopathy  Abdomen is soft and nontender without masses  External genitals are normal without lesions or rashes  Vagina is normal without discharge or bleeding  Cervix is normal without discharge or lesion  Uterus is normal, mobile, nontender without palpable mass  Adnexa are normal, nontender, without palpable mass  A:  Yearly exam      P:    Pap and HPV collected due to prior history  Will check pelvic US - some irregular cycles/pain  Rx for  Nick De La Torre to have semen analysis     Return in 6-8 weeks for follow up, further eval of infertilty  Continue timed intercourse, advised OPKS

## 2019-08-08 LAB
LAB AP GYN PRIMARY INTERPRETATION: NORMAL
LAB AP LMP: NORMAL
Lab: NORMAL

## 2019-08-13 ENCOUNTER — TELEPHONE (OUTPATIENT)
Dept: OBGYN CLINIC | Facility: CLINIC | Age: 29
End: 2019-08-13

## 2019-09-19 ENCOUNTER — HOSPITAL ENCOUNTER (OUTPATIENT)
Dept: RADIOLOGY | Facility: MEDICAL CENTER | Age: 29
Discharge: HOME | End: 2019-09-19
Payer: COMMERCIAL

## 2019-09-19 DIAGNOSIS — N92.6 IRREGULAR MENSTRUAL BLEEDING: ICD-10-CM

## 2019-09-19 PROCEDURE — 76830 TRANSVAGINAL US NON-OB: CPT

## 2019-09-19 PROCEDURE — 76856 US EXAM PELVIC COMPLETE: CPT

## 2019-09-27 ENCOUNTER — TELEPHONE (OUTPATIENT)
Dept: OBGYN CLINIC | Facility: CLINIC | Age: 29
End: 2019-09-27

## 2019-09-27 NOTE — TELEPHONE ENCOUNTER
----- Message from Valentina Kauffman MD sent at 9/27/2019  9:15 AM EDT -----  Can you let Dayanara Davidson know I reviewed her ultrasound - it is essentially normal, but her ovaries do appear consistent with PCOS, which may explain some of her cycle irregularity and also difficulty with conceiving  Her  should be obtaining a semen analysis and she should already have a follow up in office to discuss further

## 2019-10-21 ENCOUNTER — OFFICE VISIT (OUTPATIENT)
Dept: OBGYN CLINIC | Facility: MEDICAL CENTER | Age: 29
End: 2019-10-21
Payer: COMMERCIAL

## 2019-10-21 VITALS — SYSTOLIC BLOOD PRESSURE: 126 MMHG | DIASTOLIC BLOOD PRESSURE: 80 MMHG | WEIGHT: 239 LBS | BODY MASS INDEX: 39.77 KG/M2

## 2019-10-21 DIAGNOSIS — E28.2 PCOS (POLYCYSTIC OVARIAN SYNDROME): Primary | ICD-10-CM

## 2019-10-21 PROCEDURE — 99213 OFFICE O/P EST LOW 20 MIN: CPT | Performed by: OBSTETRICS & GYNECOLOGY

## 2019-10-21 RX ORDER — METFORMIN HYDROCHLORIDE 500 MG/1
500 TABLET, EXTENDED RELEASE ORAL
Qty: 60 TABLET | Refills: 2 | Status: SHIPPED | OUTPATIENT
Start: 2019-10-21 | End: 2020-01-22 | Stop reason: SDUPTHER

## 2019-10-21 NOTE — PROGRESS NOTES
[facility]  1990    S:  30 y o  female here for a follow up visit to discuss infertility  She has had an ultrasound that was c/w PCOS - which would be appropriate diagnosis with her irregular menses   has not yet gotten his semen analysis  She tried OPK without a positive result  Past Medical History:   Diagnosis Date    Abnormal Pap smear of cervix     GERD (gastroesophageal reflux disease)     HPV in female     Obesity     Varicella      Past Surgical History:   Procedure Laterality Date    WISDOM TOOTH EXTRACTION         Current Outpatient Medications:     cetirizine (ZyrTEC) 10 mg tablet, Take 10 mg by mouth daily, Disp: , Rfl:     fluticasone (FLONASE) 50 mcg/act nasal spray, SPRAY 1 SPRAY INTO EACH NOSTRIL EVERY DAY, Disp: 16 mL, Rfl: 4    meclizine (ANTIVERT) 25 mg tablet, Take 1 tablet (25 mg total) by mouth 3 (three) times a day as needed for dizziness, Disp: 30 tablet, Rfl: 4    Multiple Vitamins-Minerals (MULTIVITAL-M PO), Take by mouth daily, Disp: , Rfl:     omeprazole (PriLOSEC) 40 MG capsule, TAKE 1 CAPSULE EVERY DAY, Disp: , Rfl:     metFORMIN (GLUCOPHAGE-XR) 500 mg 24 hr tablet, Take 1 tablet (500 mg total) by mouth daily with dinner x7d then increase to 2 tabs (1000mg), Disp: 60 tablet, Rfl: 2        O:  /80 (BP Location: Left arm, Patient Position: Sitting, Cuff Size: Large)   Wt 108 kg (239 lb)   LMP 09/29/2019 (Exact Date)   BMI 39 77 kg/m²   She appears well and is in no distress    A/P:  Infertility in setting of PCOS  -  to get semen analysis ASAP  - will call with next menstrual period to schedule HSG with me (will need 4pm if option - [place]?)  - will start metformin  - weight loss encouraged (10% of body weight - 24lbs)  - if all testing normal for course of clomid    I spent 20 mins with the patient, >50% counseling

## 2019-12-09 ENCOUNTER — TELEPHONE (OUTPATIENT)
Dept: OBGYN CLINIC | Facility: CLINIC | Age: 29
End: 2019-12-09

## 2019-12-09 DIAGNOSIS — N97.9 INFERTILITY, FEMALE: Primary | ICD-10-CM

## 2019-12-09 NOTE — TELEPHONE ENCOUNTER
Patient saw Dr Mckenzie  on 10/21/2019 and was told she needed to schedule HSG with Dr Salvatore Hong during her next menstrual period  Patient is calling to set that up now  Please advise

## 2019-12-09 NOTE — TELEPHONE ENCOUNTER
Incoming call from patient  Day # 1 of menstrual cycle  Would like to schedule HSG  Advised CT is on call on 12/17  Will confirm that she is available that day  Reviewed patient's allergies  States has shellfish allergy(shrimp)  Will advise if there is any need for premedication

## 2019-12-10 NOTE — TELEPHONE ENCOUNTER
HSG scheduled for 12/18 at 11:00, first appt time available  Patient notified  May take Advil 400 mg 2 hours 30 min piror to procedure  Patient verbalizes understanding

## 2019-12-17 ENCOUNTER — OFFICE VISIT (OUTPATIENT)
Dept: URGENT CARE | Facility: MEDICAL CENTER | Age: 29
End: 2019-12-17
Payer: COMMERCIAL

## 2019-12-17 VITALS
RESPIRATION RATE: 18 BRPM | WEIGHT: 237 LBS | HEIGHT: 67 IN | TEMPERATURE: 97.5 F | SYSTOLIC BLOOD PRESSURE: 130 MMHG | DIASTOLIC BLOOD PRESSURE: 70 MMHG | HEART RATE: 105 BPM | BODY MASS INDEX: 37.2 KG/M2 | OXYGEN SATURATION: 95 %

## 2019-12-17 DIAGNOSIS — B96.89 ACUTE BACTERIAL BRONCHITIS: Primary | ICD-10-CM

## 2019-12-17 DIAGNOSIS — J20.8 ACUTE BACTERIAL BRONCHITIS: Primary | ICD-10-CM

## 2019-12-17 PROCEDURE — 99213 OFFICE O/P EST LOW 20 MIN: CPT | Performed by: PHYSICIAN ASSISTANT

## 2019-12-17 RX ORDER — PREDNISONE 50 MG/1
50 TABLET ORAL DAILY
Qty: 5 TABLET | Refills: 0 | Status: SHIPPED | OUTPATIENT
Start: 2019-12-17 | End: 2019-12-22

## 2019-12-17 RX ORDER — AZITHROMYCIN 250 MG/1
TABLET, FILM COATED ORAL
Qty: 6 TABLET | Refills: 0 | Status: SHIPPED | OUTPATIENT
Start: 2019-12-17 | End: 2019-12-22

## 2019-12-17 RX ORDER — ALBUTEROL SULFATE 90 UG/1
2 AEROSOL, METERED RESPIRATORY (INHALATION) EVERY 6 HOURS PRN
Qty: 18 G | Refills: 0 | Status: SHIPPED | OUTPATIENT
Start: 2019-12-17 | End: 2021-08-06

## 2019-12-17 NOTE — LETTER
December 17, 2019     Patient: Patito Wong   YOB: 1990   Date of Visit: 12/17/2019       To Whom it May Concern:    Bryaned Juarez is under my professional care  She was seen in my office on 12/17/2019  She may return to work 12/18/2019  If you have any questions or concerns, please don't hesitate to call  Sincerely,          Karrie Schultz PA-C        CC: Mel Carvajal

## 2019-12-17 NOTE — PROGRESS NOTES
[facility]        NAME: Stoney Oliva is a 30 y o  female  : 1990    MRN: [id number]  DATE: 2019  TIME: 8:37 AM    Assessment and Plan   Acute bacterial bronchitis [J20 8, B96 89]  1  Acute bacterial bronchitis  azithromycin (ZITHROMAX) 250 mg tablet    albuterol (VENTOLIN HFA) 90 mcg/act inhaler    predniSONE 50 mg tablet         Patient Instructions   Prescriptions sent to the pharmacy for prednisone, albuterol inhaler and azithromycin-use as directed  Continue OTC decongestant/cough medication as ordered  Saline nasal spray several times daily, Flonase in a m , cool mist humidifier, Tylenol/ibuprofen as needed for fever or pain  Follow up with PCP in 3-5 days  Proceed to  ER if symptoms worsen  Chief Complaint     Chief Complaint   Patient presents with    URI     x 3days chest congestion, coughing dark green phlegm , sore throat with coughing    left ear more painful than right   History of Present Illness   The patient is a 62-year-old female who presents with cold symptoms for 3-4 days  She states that she does not have a history of asthma but is prone to bronchitis  Positive nasal and sinus congestion  Positive facial pressure  Bilateral ear pressure without drainage or hearing changes  Positive postnasal drip  Productive cough with occasional shortness of breath and wheezing  Positive chest tightness  Negative fever or shaking chills  Negative abdominal pain, nausea, vomiting or diarrhea  She is currently taking OTC cold and sinus medication  HPI    Review of Systems   Review of Systems   Constitutional: Negative for activity change, chills, fatigue and fever  HENT: Positive for congestion, postnasal drip, rhinorrhea and sinus pressure  Negative for ear discharge, ear pain, facial swelling, mouth sores, sinus pain, sneezing, sore throat and trouble swallowing  Eyes: Negative for pain, discharge, redness and itching     Respiratory: Positive for cough, chest tightness, shortness of breath and wheezing  Negative for apnea and stridor  Cardiovascular: Negative for chest pain  Gastrointestinal: Negative for abdominal distention, abdominal pain, diarrhea, nausea and vomiting  Genitourinary: Negative for difficulty urinating  Musculoskeletal: Negative for arthralgias and myalgias  Skin: Negative for pallor and rash  Allergic/Immunologic: Negative  Neurological: Positive for dizziness and headaches  Negative for light-headedness  Hematological: Negative  Psychiatric/Behavioral: Negative  All other systems reviewed and are negative          Current Medications       Current Outpatient Medications:     cetirizine (ZyrTEC) 10 mg tablet, Take 10 mg by mouth daily, Disp: , Rfl:     fluticasone (FLONASE) 50 mcg/act nasal spray, SPRAY 1 SPRAY INTO EACH NOSTRIL EVERY DAY, Disp: 16 mL, Rfl: 4    meclizine (ANTIVERT) 25 mg tablet, Take 1 tablet (25 mg total) by mouth 3 (three) times a day as needed for dizziness, Disp: 30 tablet, Rfl: 4    metFORMIN (GLUCOPHAGE-XR) 500 mg 24 hr tablet, Take 1 tablet (500 mg total) by mouth daily with dinner x7d then increase to 2 tabs (1000mg), Disp: 60 tablet, Rfl: 2    Multiple Vitamins-Minerals (MULTIVITAL-M PO), Take by mouth daily, Disp: , Rfl:     omeprazole (PriLOSEC) 40 MG capsule, TAKE 1 CAPSULE EVERY DAY, Disp: , Rfl:     albuterol (VENTOLIN HFA) 90 mcg/act inhaler, Inhale 2 puffs every 6 (six) hours as needed for wheezing, Disp: 18 g, Rfl: 0    azithromycin (ZITHROMAX) 250 mg tablet, Take 2 tablets on day 1 and then 1 tablet daily x 4 days, Disp: 6 tablet, Rfl: 0    predniSONE 50 mg tablet, Take 1 tablet (50 mg total) by mouth daily for 5 days, Disp: 5 tablet, Rfl: 0    Current Allergies     Allergies as of 12/17/2019 - Reviewed 12/17/2019   Allergen Reaction Noted    Shrimp extract allergy skin test Hives 06/12/2019            The following portions of the patient's history were reviewed and updated as appropriate: allergies, current medications, past family history, past medical history, past social history, past surgical history and problem list      Past Medical History:   Diagnosis Date    Abnormal Pap smear of cervix     GERD (gastroesophageal reflux disease)     HPV in female     Obesity     Varicella        Past Surgical History:   Procedure Laterality Date    WISDOM TOOTH EXTRACTION         Family History   Problem Relation Age of Onset    Coronary artery disease Family     Diabetes Family     Drug abuse Mother     Hypertension Father     Diabetes Father     Gestational diabetes Sister     No Known Problems Brother     No Known Problems Brother     No Known Problems Sister     No Known Problems Sister          Medications have been verified  Objective   /70   Pulse 105   Temp 97 5 °F (36 4 °C)   Resp 18   Ht 5' 7" (1 702 m)   Wt 108 kg (237 lb)   SpO2 95%   BMI 37 12 kg/m²        Physical Exam     Physical Exam   Constitutional: She is oriented to person, place, and time  Vital signs are normal  She appears well-developed and well-nourished  Non-toxic appearance  She does not have a sickly appearance  She appears ill  No distress  HENT:   Head: Normocephalic and atraumatic  Right Ear: Hearing, tympanic membrane, external ear and ear canal normal  No drainage or tenderness  Tympanic membrane is not perforated, not erythematous, not retracted and not bulging  No middle ear effusion  No decreased hearing is noted  Left Ear: Hearing, tympanic membrane, external ear and ear canal normal  No drainage or tenderness  Tympanic membrane is not perforated, not erythematous, not retracted and not bulging  No middle ear effusion  No decreased hearing is noted  Nose: Rhinorrhea present  No mucosal edema or septal deviation  Right sinus exhibits no maxillary sinus tenderness and no frontal sinus tenderness   Left sinus exhibits no maxillary sinus tenderness and no frontal sinus tenderness  Mouth/Throat: Uvula is midline and mucous membranes are normal  No oral lesions  No dental abscesses or uvula swelling  Posterior oropharyngeal erythema present  No oropharyngeal exudate, posterior oropharyngeal edema or tonsillar abscesses  No tonsillar exudate  Eyes: Pupils are equal, round, and reactive to light  Conjunctivae and EOM are normal    Neck: Trachea normal, normal range of motion and full passive range of motion without pain  Neck supple  No JVD present  No edema and no erythema present  No thyromegaly present  Cardiovascular: Normal rate, regular rhythm, S1 normal, S2 normal, normal heart sounds, intact distal pulses and normal pulses  No murmur heard  Pulmonary/Chest: Effort normal and breath sounds normal  No accessory muscle usage or stridor  No tachypnea  No respiratory distress  She has no decreased breath sounds  She has no wheezes  She has no rhonchi  She has no rales  Abdominal: Soft  Normal appearance and bowel sounds are normal  She exhibits no distension  There is no hepatosplenomegaly  There is no tenderness  Musculoskeletal: Normal range of motion  She exhibits no edema  Neurological: She is alert and oriented to person, place, and time  Skin: Skin is warm, dry and intact  No abrasion, no lesion and no rash noted  She is not diaphoretic  No cyanosis or erythema  Nails show no clubbing  Psychiatric: She has a normal mood and affect  Her speech is normal and behavior is normal    Nursing note and vitals reviewed

## 2019-12-18 ENCOUNTER — HOSPITAL ENCOUNTER (OUTPATIENT)
Dept: RADIOLOGY | Facility: HOSPITAL | Age: 29
Discharge: HOME | End: 2019-12-18
Attending: OBSTETRICS & GYNECOLOGY
Payer: COMMERCIAL

## 2019-12-18 DIAGNOSIS — N97.9 INFERTILITY, FEMALE: ICD-10-CM

## 2019-12-18 PROCEDURE — 74740 X-RAY FEMALE GENITAL TRACT: CPT

## 2019-12-18 PROCEDURE — 58340 CATHETER FOR HYSTEROGRAPHY: CPT

## 2019-12-18 RX ADMIN — IOHEXOL 4 ML: 240 INJECTION, SOLUTION INTRATHECAL; INTRAVASCULAR; INTRAVENOUS; ORAL at 12:15

## 2020-01-21 DIAGNOSIS — E28.2 PCOS (POLYCYSTIC OVARIAN SYNDROME): ICD-10-CM

## 2020-01-22 RX ORDER — METFORMIN HYDROCHLORIDE 500 MG/1
TABLET, EXTENDED RELEASE ORAL
Qty: 60 TABLET | Refills: 0 | Status: SHIPPED | OUTPATIENT
Start: 2020-01-22 | End: 2020-02-24

## 2020-01-27 ENCOUNTER — OFFICE VISIT (OUTPATIENT)
Dept: URGENT CARE | Facility: MEDICAL CENTER | Age: 30
End: 2020-01-27
Payer: COMMERCIAL

## 2020-01-27 VITALS
RESPIRATION RATE: 20 BRPM | HEART RATE: 80 BPM | WEIGHT: 240 LBS | BODY MASS INDEX: 37.67 KG/M2 | DIASTOLIC BLOOD PRESSURE: 84 MMHG | SYSTOLIC BLOOD PRESSURE: 129 MMHG | TEMPERATURE: 97.8 F | HEIGHT: 67 IN

## 2020-01-27 DIAGNOSIS — H92.02 LEFT EAR PAIN: Primary | ICD-10-CM

## 2020-01-27 PROCEDURE — 99213 OFFICE O/P EST LOW 20 MIN: CPT | Performed by: PHYSICIAN ASSISTANT

## 2020-01-27 NOTE — PATIENT INSTRUCTIONS
Left ear pain  Over the counter tylenol as needed  Follow up with PCP in 3-5 days  Proceed to  ER if symptoms worsen

## 2020-01-27 NOTE — PROGRESS NOTES
[facility]        NAME: Samson Jimenez is a 30 y o  female  : 1990    MRN: [id number]  DATE: 2020  TIME: 8:07 AM    Assessment and Plan   Left ear pain [H92 02]  1  Left ear pain           Patient Instructions     Left ear pain  Over the counter tylenol as needed  Follow up with PCP in 3-5 days  Proceed to  ER if symptoms worsen  Chief Complaint     Chief Complaint   Patient presents with    Earache     since dec when pt was here she has been having left ear pain  was put on steriods and ABX   pt finished course  still having ear pain   History of Present Illness       29 y/o female presents c/o left ear pain on/ off x 1 month  Denies fever, chills, nausea, vomiting      Review of Systems   Review of Systems   Constitutional: Negative for activity change, appetite change, chills, diaphoresis, fatigue and fever  HENT: Positive for ear pain  Negative for congestion, ear discharge, facial swelling, hearing loss, mouth sores, nosebleeds, postnasal drip, rhinorrhea, sinus pressure, sinus pain, sneezing, sore throat and voice change  Respiratory: Negative for apnea, cough, choking, chest tightness, shortness of breath, wheezing and stridor  Cardiovascular: Negative            Current Medications       Current Outpatient Medications:     albuterol (VENTOLIN HFA) 90 mcg/act inhaler, Inhale 2 puffs every 6 (six) hours as needed for wheezing, Disp: 18 g, Rfl: 0    cetirizine (ZyrTEC) 10 mg tablet, Take 10 mg by mouth daily, Disp: , Rfl:     fluticasone (FLONASE) 50 mcg/act nasal spray, SPRAY 1 SPRAY INTO EACH NOSTRIL EVERY DAY, Disp: 16 mL, Rfl: 4    meclizine (ANTIVERT) 25 mg tablet, Take 1 tablet (25 mg total) by mouth 3 (three) times a day as needed for dizziness, Disp: 30 tablet, Rfl: 4    metFORMIN (GLUCOPHAGE-XR) 500 mg 24 hr tablet, TAKE 1 TABLET BY MOUTH DAILY WITH DINNER FOR 7 DAYS, THEN INCREASE TO 2 TABLETS DAILY, Disp: 60 tablet, Rfl: 0    Multiple Vitamins-Minerals (MULTIVITAL-M PO), Take by mouth daily, Disp: , Rfl:     omeprazole (PriLOSEC) 40 MG capsule, TAKE 1 CAPSULE EVERY DAY, Disp: , Rfl:     Current Allergies     Allergies as of 01/27/2020 - Reviewed 01/27/2020   Allergen Reaction Noted    Shrimp extract allergy skin test Hives 06/12/2019            The following portions of the patient's history were reviewed and updated as appropriate: allergies, current medications, past family history, past medical history, past social history, past surgical history and problem list      Past Medical History:   Diagnosis Date    Abnormal Pap smear of cervix     GERD (gastroesophageal reflux disease)     HPV in female     Obesity     Varicella        Past Surgical History:   Procedure Laterality Date    WISDOM TOOTH EXTRACTION         Family History   Problem Relation Age of Onset    Coronary artery disease Family     Diabetes Family     Drug abuse Mother     Hypertension Father     Diabetes Father     Gestational diabetes Sister     No Known Problems Brother     No Known Problems Brother     No Known Problems Sister     No Known Problems Sister          Medications have been verified  Objective   /84   Pulse 80   Temp 97 8 °F (36 6 °C)   Resp 20   Ht 5' 7" (1 702 m)   Wt 109 kg (240 lb)   BMI 37 59 kg/m²        Physical Exam     Physical Exam   Constitutional: She appears well-developed and well-nourished  HENT:   Head: Normocephalic and atraumatic  Right Ear: Hearing, tympanic membrane, external ear and ear canal normal    Left Ear: Hearing, tympanic membrane, external ear and ear canal normal    Nose: Nose normal    Mouth/Throat: Oropharynx is clear and moist  No oropharyngeal exudate  Neck: Normal range of motion  Neck supple  Cardiovascular: Normal rate, regular rhythm, normal heart sounds and intact distal pulses  Pulmonary/Chest: Effort normal and breath sounds normal  No stridor  No respiratory distress   She has no wheezes  She has no rales  She exhibits no tenderness  Abdominal: Bowel sounds are normal    Lymphadenopathy:     She has no cervical adenopathy

## 2020-02-22 DIAGNOSIS — E28.2 PCOS (POLYCYSTIC OVARIAN SYNDROME): ICD-10-CM

## 2020-02-24 RX ORDER — METFORMIN HYDROCHLORIDE 500 MG/1
TABLET, EXTENDED RELEASE ORAL
Qty: 60 TABLET | Refills: 0 | Status: SHIPPED | OUTPATIENT
Start: 2020-02-24 | End: 2020-03-16 | Stop reason: SDUPTHER

## 2020-03-06 ENCOUNTER — HOSPITAL ENCOUNTER (EMERGENCY)
Facility: HOSPITAL | Age: 30
Discharge: HOME | End: 2020-03-06
Attending: EMERGENCY MEDICINE
Payer: COMMERCIAL

## 2020-03-06 VITALS
SYSTOLIC BLOOD PRESSURE: 145 MMHG | DIASTOLIC BLOOD PRESSURE: 76 MMHG | HEART RATE: 96 BPM | WEIGHT: 249.12 LBS | OXYGEN SATURATION: 98 % | TEMPERATURE: 98.2 F | RESPIRATION RATE: 20 BRPM | BODY MASS INDEX: 39.02 KG/M2

## 2020-03-06 DIAGNOSIS — N90.89 LABIA IRRITATION: Primary | ICD-10-CM

## 2020-03-06 DIAGNOSIS — F41.9 ANXIETY: ICD-10-CM

## 2020-03-06 LAB
BACTERIA UR QL AUTO: ABNORMAL /HPF
BILIRUB UR QL STRIP: NEGATIVE
CLARITY UR: CLEAR
COLOR UR: YELLOW
GLUCOSE UR STRIP-MCNC: NEGATIVE MG/DL
HGB UR QL STRIP.AUTO: ABNORMAL
KETONES UR STRIP-MCNC: NEGATIVE MG/DL
LEUKOCYTE ESTERASE UR QL STRIP: NEGATIVE
NITRITE UR QL STRIP: NEGATIVE
NON-SQ EPI CELLS URNS QL MICRO: ABNORMAL /HPF
PH UR STRIP.AUTO: 7 [PH] (ref 4.5–8)
PROT UR STRIP-MCNC: NEGATIVE MG/DL
RBC #/AREA URNS AUTO: ABNORMAL /HPF
SP GR UR STRIP.AUTO: 1.01 (ref 1–1.03)
UROBILINOGEN UR QL STRIP.AUTO: 0.2 E.U./DL
WBC #/AREA URNS AUTO: ABNORMAL /HPF

## 2020-03-06 PROCEDURE — 81001 URINALYSIS AUTO W/SCOPE: CPT

## 2020-03-06 PROCEDURE — 99283 EMERGENCY DEPT VISIT LOW MDM: CPT

## 2020-03-06 PROCEDURE — 87591 N.GONORRHOEAE DNA AMP PROB: CPT | Performed by: PHYSICIAN ASSISTANT

## 2020-03-06 PROCEDURE — 87491 CHLMYD TRACH DNA AMP PROBE: CPT | Performed by: PHYSICIAN ASSISTANT

## 2020-03-06 PROCEDURE — 99283 EMERGENCY DEPT VISIT LOW MDM: CPT | Performed by: PHYSICIAN ASSISTANT

## 2020-03-06 RX ORDER — SULFAMETHOXAZOLE AND TRIMETHOPRIM 800; 160 MG/1; MG/1
1 TABLET ORAL ONCE
Status: COMPLETED | OUTPATIENT
Start: 2020-03-06 | End: 2020-03-06

## 2020-03-06 RX ORDER — IBUPROFEN 600 MG/1
600 TABLET ORAL ONCE
Status: COMPLETED | OUTPATIENT
Start: 2020-03-06 | End: 2020-03-06

## 2020-03-06 RX ORDER — LIDOCAINE HYDROCHLORIDE 20 MG/ML
15 SOLUTION OROPHARYNGEAL ONCE
Status: COMPLETED | OUTPATIENT
Start: 2020-03-06 | End: 2020-03-06

## 2020-03-06 RX ORDER — LIDOCAINE HYDROCHLORIDE 10 MG/ML
10 INJECTION, SOLUTION EPIDURAL; INFILTRATION; INTRACAUDAL; PERINEURAL ONCE
Status: DISCONTINUED | OUTPATIENT
Start: 2020-03-06 | End: 2020-03-07 | Stop reason: HOSPADM

## 2020-03-06 RX ORDER — SULFAMETHOXAZOLE AND TRIMETHOPRIM 800; 160 MG/1; MG/1
1 TABLET ORAL 2 TIMES DAILY
Qty: 14 TABLET | Refills: 0 | OUTPATIENT
Start: 2020-03-06 | End: 2020-03-07

## 2020-03-06 RX ADMIN — LIDOCAINE HYDROCHLORIDE 15 ML: 20 SOLUTION ORAL; TOPICAL at 20:29

## 2020-03-06 RX ADMIN — IBUPROFEN 600 MG: 600 TABLET ORAL at 20:30

## 2020-03-06 RX ADMIN — SULFAMETHOXAZOLE AND TRIMETHOPRIM 1 TABLET: 800; 160 TABLET ORAL at 22:03

## 2020-03-07 ENCOUNTER — HOSPITAL ENCOUNTER (EMERGENCY)
Facility: HOSPITAL | Age: 30
Discharge: HOME | End: 2020-03-07
Attending: EMERGENCY MEDICINE | Admitting: EMERGENCY MEDICINE
Payer: COMMERCIAL

## 2020-03-07 VITALS
SYSTOLIC BLOOD PRESSURE: 162 MMHG | TEMPERATURE: 98.5 F | RESPIRATION RATE: 16 BRPM | DIASTOLIC BLOOD PRESSURE: 76 MMHG | OXYGEN SATURATION: 97 % | HEART RATE: 110 BPM

## 2020-03-07 DIAGNOSIS — N75.1 BARTHOLIN'S GLAND ABSCESS: Primary | ICD-10-CM

## 2020-03-07 LAB
ALBUMIN SERPL BCP-MCNC: 4.1 G/DL (ref 3.5–5)
ALP SERPL-CCNC: 74 U/L (ref 46–116)
ALT SERPL W P-5'-P-CCNC: 27 U/L (ref 12–78)
ANION GAP SERPL CALCULATED.3IONS-SCNC: 13 MMOL/L (ref 4–13)
AST SERPL W P-5'-P-CCNC: 18 U/L (ref 5–45)
BACTERIA UR QL AUTO: ABNORMAL /HPF
BASOPHILS # BLD AUTO: 0.04 THOUSANDS/ΜL (ref 0–0.1)
BASOPHILS NFR BLD AUTO: 0 % (ref 0–1)
BILIRUB SERPL-MCNC: 0.35 MG/DL (ref 0.2–1)
BILIRUB UR QL STRIP: NEGATIVE
BUN SERPL-MCNC: 9 MG/DL (ref 5–25)
CALCIUM SERPL-MCNC: 9.6 MG/DL (ref 8.3–10.1)
CHLORIDE SERPL-SCNC: 104 MMOL/L (ref 100–108)
CLARITY UR: CLEAR
CO2 SERPL-SCNC: 23 MMOL/L (ref 21–32)
COLOR UR: YELLOW
CREAT SERPL-MCNC: 1.02 MG/DL (ref 0.6–1.3)
EOSINOPHIL # BLD AUTO: 0.12 THOUSAND/ΜL (ref 0–0.61)
EOSINOPHIL NFR BLD AUTO: 1 % (ref 0–6)
ERYTHROCYTE [DISTWIDTH] IN BLOOD BY AUTOMATED COUNT: 11.7 % (ref 11.6–15.1)
EXT PREG TEST URINE: NEGATIVE
EXT. CONTROL ED NAV: NORMAL
GFR SERPL CREATININE-BSD FRML MDRD: 75 ML/MIN/1.73SQ M
GLUCOSE SERPL-MCNC: 97 MG/DL (ref 65–140)
GLUCOSE UR STRIP-MCNC: NEGATIVE MG/DL
HCT VFR BLD AUTO: 44.4 % (ref 34.8–46.1)
HGB BLD-MCNC: 15.2 G/DL (ref 11.5–15.4)
HGB UR QL STRIP.AUTO: ABNORMAL
IMM GRANULOCYTES # BLD AUTO: 0.04 THOUSAND/UL (ref 0–0.2)
IMM GRANULOCYTES NFR BLD AUTO: 0 % (ref 0–2)
KETONES UR STRIP-MCNC: NEGATIVE MG/DL
LEUKOCYTE ESTERASE UR QL STRIP: ABNORMAL
LYMPHOCYTES # BLD AUTO: 1.81 THOUSANDS/ΜL (ref 0.6–4.47)
LYMPHOCYTES NFR BLD AUTO: 12 % (ref 14–44)
MCH RBC QN AUTO: 31.6 PG (ref 26.8–34.3)
MCHC RBC AUTO-ENTMCNC: 34.2 G/DL (ref 31.4–37.4)
MCV RBC AUTO: 92 FL (ref 82–98)
MONOCYTES # BLD AUTO: 0.94 THOUSAND/ΜL (ref 0.17–1.22)
MONOCYTES NFR BLD AUTO: 6 % (ref 4–12)
NEUTROPHILS # BLD AUTO: 11.93 THOUSANDS/ΜL (ref 1.85–7.62)
NEUTS SEG NFR BLD AUTO: 81 % (ref 43–75)
NITRITE UR QL STRIP: NEGATIVE
NON-SQ EPI CELLS URNS QL MICRO: ABNORMAL /HPF
NRBC BLD AUTO-RTO: 0 /100 WBCS
PH UR STRIP.AUTO: 6.5 [PH] (ref 4.5–8)
PLATELET # BLD AUTO: 241 THOUSANDS/UL (ref 149–390)
PMV BLD AUTO: 9.9 FL (ref 8.9–12.7)
POTASSIUM SERPL-SCNC: 3.8 MMOL/L (ref 3.5–5.3)
PROT SERPL-MCNC: 8.4 G/DL (ref 6.4–8.2)
PROT UR STRIP-MCNC: NEGATIVE MG/DL
RBC # BLD AUTO: 4.81 MILLION/UL (ref 3.81–5.12)
RBC #/AREA URNS AUTO: ABNORMAL /HPF
SODIUM SERPL-SCNC: 140 MMOL/L (ref 136–145)
SP GR UR STRIP.AUTO: 1.01 (ref 1–1.03)
UROBILINOGEN UR QL STRIP.AUTO: 0.2 E.U./DL
WBC # BLD AUTO: 14.88 THOUSAND/UL (ref 4.31–10.16)
WBC #/AREA URNS AUTO: ABNORMAL /HPF

## 2020-03-07 PROCEDURE — NC001 PR NO CHARGE: Performed by: OBSTETRICS & GYNECOLOGY

## 2020-03-07 PROCEDURE — 81025 URINE PREGNANCY TEST: CPT | Performed by: PHYSICIAN ASSISTANT

## 2020-03-07 PROCEDURE — 81001 URINALYSIS AUTO W/SCOPE: CPT

## 2020-03-07 PROCEDURE — 96374 THER/PROPH/DIAG INJ IV PUSH: CPT

## 2020-03-07 PROCEDURE — 99284 EMERGENCY DEPT VISIT MOD MDM: CPT | Performed by: PHYSICIAN ASSISTANT

## 2020-03-07 PROCEDURE — 85025 COMPLETE CBC W/AUTO DIFF WBC: CPT | Performed by: PHYSICIAN ASSISTANT

## 2020-03-07 PROCEDURE — 99283 EMERGENCY DEPT VISIT LOW MDM: CPT

## 2020-03-07 PROCEDURE — 36415 COLL VENOUS BLD VENIPUNCTURE: CPT | Performed by: PHYSICIAN ASSISTANT

## 2020-03-07 PROCEDURE — 80053 COMPREHEN METABOLIC PANEL: CPT | Performed by: PHYSICIAN ASSISTANT

## 2020-03-07 PROCEDURE — 96375 TX/PRO/DX INJ NEW DRUG ADDON: CPT

## 2020-03-07 RX ORDER — ONDANSETRON 2 MG/ML
4 INJECTION INTRAMUSCULAR; INTRAVENOUS ONCE
Status: COMPLETED | OUTPATIENT
Start: 2020-03-07 | End: 2020-03-07

## 2020-03-07 RX ORDER — LIDOCAINE HYDROCHLORIDE AND EPINEPHRINE 10; 10 MG/ML; UG/ML
20 INJECTION, SOLUTION INFILTRATION; PERINEURAL ONCE
Status: COMPLETED | OUTPATIENT
Start: 2020-03-07 | End: 2020-03-07

## 2020-03-07 RX ORDER — MORPHINE SULFATE 4 MG/ML
4 INJECTION, SOLUTION INTRAMUSCULAR; INTRAVENOUS ONCE
Status: COMPLETED | OUTPATIENT
Start: 2020-03-07 | End: 2020-03-07

## 2020-03-07 RX ORDER — LIDOCAINE 40 MG/G
CREAM TOPICAL ONCE
Status: DISCONTINUED | OUTPATIENT
Start: 2020-03-07 | End: 2020-03-07 | Stop reason: HOSPADM

## 2020-03-07 RX ADMIN — ONDANSETRON 4 MG: 2 INJECTION INTRAMUSCULAR; INTRAVENOUS at 17:07

## 2020-03-07 RX ADMIN — MORPHINE SULFATE 4 MG: 4 INJECTION INTRAVENOUS at 17:16

## 2020-03-07 RX ADMIN — LIDOCAINE HYDROCHLORIDE,EPINEPHRINE BITARTRATE 20 ML: 10; .01 INJECTION, SOLUTION INFILTRATION; PERINEURAL at 18:19

## 2020-03-09 ENCOUNTER — OFFICE VISIT (OUTPATIENT)
Dept: OBGYN CLINIC | Facility: CLINIC | Age: 30
End: 2020-03-09
Payer: COMMERCIAL

## 2020-03-09 VITALS — DIASTOLIC BLOOD PRESSURE: 70 MMHG | SYSTOLIC BLOOD PRESSURE: 132 MMHG | WEIGHT: 244 LBS | BODY MASS INDEX: 38.22 KG/M2

## 2020-03-09 DIAGNOSIS — N75.0 CYST OF BARTHOLIN'S GLAND DUCT: Primary | ICD-10-CM

## 2020-03-09 DIAGNOSIS — Z30.011 ORAL CONTRACEPTION INITIAL PRESCRIPTION: ICD-10-CM

## 2020-03-09 LAB
C TRACH DNA SPEC QL NAA+PROBE: NEGATIVE
N GONORRHOEA DNA SPEC QL NAA+PROBE: NEGATIVE

## 2020-03-09 PROCEDURE — 99212 OFFICE O/P EST SF 10 MIN: CPT | Performed by: OBSTETRICS & GYNECOLOGY

## 2020-03-09 PROCEDURE — 1036F TOBACCO NON-USER: CPT | Performed by: OBSTETRICS & GYNECOLOGY

## 2020-03-09 RX ORDER — NORETHINDRONE ACETATE AND ETHINYL ESTRADIOL AND FERROUS FUMARATE 1MG-20(24)
1 KIT ORAL DAILY
Qty: 28 TABLET | Refills: 6 | Status: SHIPPED | OUTPATIENT
Start: 2020-03-09 | End: 2020-03-16 | Stop reason: ALTCHOICE

## 2020-03-09 NOTE — ED PROVIDER NOTES
Pt Name: Olegario Webster  MRN: [id number]  YOB: 1990  Age/Sex: 30 y o  female  Date of evaluation: 3/6/2020  PCP: Estela Mark [id number]    Chief Complaint   Patient presents with    Cyst     Pt reports worsening bartholin cyst since yesterday  H/O same  - drainage  HPI    Sreedhar Hilton presents to the Emergency Department complaining of Cyst        Olegario Webster is a 30 y o  female who presents due to Cyst/Labial irritation  Pt reports multiple episodes of bartholin gland cysts/abscesses in the past with I&D--was seen by OBGYN in the past for this recurrent issue approximately 6 months ago and was told that she should not receive word catheter in future with I&D/aspiration  Pt reports this episode began last night and worsening today with irritation, pain worse with walking  No other complaints at this time         History provided by:  Patient and significant other   used: No          Past Medical and Surgical History    Past Medical History:   Diagnosis Date    Abnormal Pap smear of cervix     GERD (gastroesophageal reflux disease)     HPV in female     Obesity     PCOS (polycystic ovarian syndrome) 11/2019    Varicella        Past Surgical History:   Procedure Laterality Date    WISDOM TOOTH EXTRACTION         Family History   Problem Relation Age of Onset    Coronary artery disease Family     Diabetes Family     Drug abuse Mother     Hypertension Father     Diabetes Father     Gestational diabetes Sister     No Known Problems Brother     No Known Problems Brother     No Known Problems Sister     No Known Problems Sister        Social History     Tobacco Use    Smoking status: Never Smoker    Smokeless tobacco: Never Used   Substance Use Topics    Alcohol use: Yes     Frequency: 2-4 times a month     Drinks per session: 1 or 2     Binge frequency: Monthly     Comment: social    Drug use: Never       Allergies    Allergies   Allergen Reactions    Shrimp Extract Allergy Skin Test Hives       Home Medications:    Prior to Admission medications    Medication Sig Start Date End Date Taking? Authorizing Provider   albuterol (VENTOLIN HFA) 90 mcg/act inhaler Inhale 2 puffs every 6 (six) hours as needed for wheezing 12/17/19   Jhony Elizabeth PA-C   cetirizine (ZyrTEC) 10 mg tablet Take 10 mg by mouth daily    Historical Provider, MD   fluticasone (FLONASE) 50 mcg/act nasal spray SPRAY 1 SPRAY INTO EACH NOSTRIL EVERY DAY 7/29/19   JAVED Rodríguez   meclizine (ANTIVERT) 25 mg tablet Take 1 tablet (25 mg total) by mouth 3 (three) times a day as needed for dizziness 2/25/19   JAVED Rodríguez   metFORMIN (GLUCOPHAGE-XR) 500 mg 24 hr tablet TAKE 1 TABLET BY MOUTH DAILY WITH DINNER FOR 7 DAYS, THEN INCREASE TO 2 TABLETS DAILY 2/24/20   Brady Crawford MD   Multiple Vitamins-Minerals (MULTIVITAL-M PO) Take by mouth daily    Historical Provider, MD   omeprazole (PriLOSEC) 40 MG capsule TAKE 1 CAPSULE EVERY DAY 9/9/17   Historical Provider, MD           Review of Systems    Review of Systems   Constitutional: Negative for activity change, appetite change, chills, diaphoresis, fatigue and fever  Respiratory: Negative for cough and shortness of breath  Cardiovascular: Negative for chest pain  Gastrointestinal: Negative for abdominal pain  Genitourinary: Positive for pelvic pain  Negative for decreased urine volume, difficulty urinating, dysuria, flank pain, frequency, genital sores, hematuria, urgency, vaginal bleeding and vaginal discharge  Left labial irritation, swelling     Skin: Negative for rash and wound  Neurological: Negative for dizziness, light-headedness and headaches  Hematological: Negative for adenopathy  Psychiatric/Behavioral: The patient is not nervous/anxious  All other systems reviewed and are negative  All other systems reviewed and negative      Physical Exam      ED Triage Vitals [03/06/20 2001] Temperature Pulse Respirations Blood Pressure SpO2   98 2 °F (36 8 °C) 96 20 145/76 98 %      Temp Source Heart Rate Source Patient Position - Orthostatic VS BP Location FiO2 (%)   Oral Monitor Lying Right arm --      Pain Score       Worst Possible Pain               Physical Exam   Constitutional: She is oriented to person, place, and time  She appears well-developed and well-nourished  No distress  HENT:   Head: Normocephalic and atraumatic  Mouth/Throat: Oropharynx is clear and moist    Eyes: Pupils are equal, round, and reactive to light  Conjunctivae and EOM are normal    Neck: Normal range of motion  Neck supple  Cardiovascular: Normal rate, regular rhythm, normal heart sounds and intact distal pulses  Exam reveals no gallop and no friction rub  No murmur heard  Pulmonary/Chest: Effort normal and breath sounds normal  No stridor  No respiratory distress  She has no wheezes  She has no rales  She exhibits no tenderness  Abdominal: Soft  Bowel sounds are normal  She exhibits no distension and no mass  There is no tenderness  There is no rebound and no guarding  No hernia  Genitourinary: Vagina normal and uterus normal  Pelvic exam was performed with patient supine  No labial fusion  There is no rash, tenderness, lesion or injury on the right labia  There is tenderness (mild generalized with edema throughout ) on the left labia  There is no rash, lesion or injury on the left labia  Cervix exhibits no motion tenderness, no discharge and no friability  Right adnexum displays no mass, no tenderness and no fullness  Left adnexum displays no mass, no tenderness and no fullness  No erythema, tenderness or bleeding in the vagina  No foreign body in the vagina  No signs of injury around the vagina  No vaginal discharge found  Genitourinary Comments: Nurse present throughout examination  No focal swelling, ultrasound utilized bedside without evidence of visible collection  No evidence of cellulitis  Musculoskeletal: Normal range of motion  Lymphadenopathy: No inguinal adenopathy noted on the right or left side  Neurological: She is alert and oriented to person, place, and time  Skin: Skin is warm and dry  Capillary refill takes less than 2 seconds  No rash noted  She is not diaphoretic  Psychiatric:   anxious   Nursing note and vitals reviewed            Diagnostic Results      Labs:    Results for orders placed or performed during the hospital encounter of 03/06/20   Urine Microscopic   Result Value Ref Range    RBC, UA 0-1 (A) None Seen, 0-5 /hpf    WBC, UA None Seen None Seen, 0-5, 5-55, 5-65 /hpf    Epithelial Cells Occasional None Seen, Occasional /hpf    Bacteria, UA None Seen None Seen, Occasional /hpf   Urine Macroscopic, POC   Result Value Ref Range    Color, UA Yellow     Clarity, UA Clear     pH, UA 7 0 4 5 - 8 0    Leukocytes, UA Negative Negative    Nitrite, UA Negative Negative    Protein, UA Negative Negative mg/dl    Glucose, UA Negative Negative mg/dl    Ketones, UA Negative Negative mg/dl    Urobilinogen, UA 0 2 0 2, 1 0 E U /dl E U /dl    Bilirubin, UA Negative Negative    Blood, UA Small (A) Negative    Specific Gravity, UA 1 015 1 003 - 1 030       All labs reviewed and utilized in the medical decision making process    Radiology:    No orders to display       All radiology studies independently viewed by me and interpreted by the radiologist     Procedure    Procedures      Assessment and Plan    MDM  Number of Diagnoses or Management Options   Anxiety:  new, no workup  Labia irritation:  new, needed workup     Amount and/or Complexity of Data Reviewed  Clinical lab tests: ordered and reviewed   Tests in the medicine section of CPT®:  reviewed and ordered   Obtain history from someone other than the patient: yes   Review and summarize past medical records: yes    Risk of Complications, Morbidity, and/or Mortality  Presenting problems: moderate   Diagnostic procedures: moderate Management options: moderate    Patient Progress  Patient progress: stable      Initial ED assessment:  Sasmon Jimenez is a 30 y o  female with significant PMH for Bartholin gland abscesses/cysts who presents with Labial irritation  Vitals signs reviewed and wnl  Physical examination remarkable for Nurse present throughout examination  No focal swelling, ultrasound utilized bedside without evidence of visible collection  No evidence of cellulitis       Initial Ddx  includes but is not limited to:  Bartholin gland cyst, labial edema, abscess, groin strain, hernia, lymphadenopathy, renal colic, UTI, urethritis, intraabdominal process, ovarian etiology, cellulitis  Initial ED plan:   Plan will be to perform diagnostic testing of urinalysis, gc chlamydia and treat symptomatically  Final ED summary/disposition: Discussed with pt--no indication for I&D at this time as no discernible abscess/collection---pt upset with this as she sts that this is what typically occurs when this happens though does admit that this episode is not as severe as in past  Recommended sitz baths, will give abx and f/u OBGYN  Discussed results of diagnostic testing with pt and significant other with permission and in detail  Home care recommendations given with discharge paperwork  Return to ED instructions given if new/worsening sxs        MDM  Reviewed:  previous chart, nursing note and vitals  Interpretation: labs        ED Course of Care and Re-Assessments                           Medications   Lidocaine Viscous HCl (XYLOCAINE) 2 % mucosal solution 15 mL (15 mL Swish & Spit Given 3/6/20 2029)   ibuprofen (MOTRIN) tablet 600 mg (600 mg Oral Given 3/6/20 2030)   sulfamethoxazole-trimethoprim (BACTRIM DS) 800-160 mg per tablet 1 tablet (1 tablet Oral Given 3/6/20 2203)         FINAL IMPRESSION    Final diagnoses:   Labia irritation - with edema, no evidence of abscess   Anxiety         DISPOSITION/PLAN  Time reflects when diagnosis was documented in both MDM as applicable and the Disposition within this note     Time User Action Codes Description Comment    3/6/2020 10:01 PM Larayne Gang Add [N90 89] Labia irritation     3/6/2020 10:01 PM Larayne Gang Modify [N90 89] Labia irritation with edema, no evidence of abscess    3/9/2020  5:39 AM Larbalajine Gang Add [F41 9] Anxiety       ED Disposition     ED Disposition Condition Date/Time Comment    Discharge Stable Fri Mar 6, 2020 10:00 PM [address] discharge to home/self care  Follow-up Information     Follow up With Specialties Details Why Contact Info Additional Information    Your OBGYN  Call in 2 days For follow up      Inna Mchugh MD Family Medicine Go to  For follow up [address] [place] [address] Windy Bergeron [address]       [facility] Emergency Medicine Go to  If symptoms worsen [address] [facility]  AN ED,  [address], [place], [place], [facility]              PATIENT REFERRED TO:    Your OBGYN    Call in 2 days  For follow up    Inna Mchugh MD  [address] [facility] [address]  [phone]    Go to   For follow up    [facility] Emergency Department  [address] [facility] [address] [phone]  Go to   If symptoms worsen      DISCHARGE MEDICATIONS:    Discharge Medication List as of 3/6/2020 10:03 PM      START taking these medications    Details   sulfamethoxazole-trimethoprim (BACTRIM DS) 800-160 mg per tablet Take 1 tablet by mouth 2 (two) times a day for 7 days smx-tmp DS (BACTRIM) 800-160 mg tabs (1tab q12 D10), Starting Fri 3/6/2020, Until Fri 3/13/2020, Print         CONTINUE these medications which have NOT CHANGED    Details   albuterol (VENTOLIN HFA) 90 mcg/act inhaler Inhale 2 puffs every 6 (six) hours as needed for wheezing, Starting Tue 12/17/2019, Normal      cetirizine (ZyrTEC) 10 mg tablet Take 10 mg by mouth daily, Historical Med      fluticasone (FLONASE) 50 mcg/act nasal spray SPRAY 1 SPRAY INTO EACH NOSTRIL EVERY DAY, Normal      meclizine (ANTIVERT) 25 mg tablet Take 1 tablet (25 mg total) by mouth 3 (three) times a day as needed for dizziness, Starting Mon 2/25/2019, Normal      metFORMIN (GLUCOPHAGE-XR) 500 mg 24 hr tablet TAKE 1 TABLET BY MOUTH DAILY WITH DINNER FOR 7 DAYS, THEN INCREASE TO 2 TABLETS DAILY, Normal      Multiple Vitamins-Minerals (MULTIVITAL-M PO) Take by mouth daily, Historical Med      omeprazole (PriLOSEC) 40 MG capsule TAKE 1 CAPSULE EVERY DAY, Historical Med             No discharge procedures on file           KEENAN Hernandez PA-C  03/09/20 [id number]

## 2020-03-16 ENCOUNTER — OFFICE VISIT (OUTPATIENT)
Dept: OBGYN CLINIC | Facility: CLINIC | Age: 30
End: 2020-03-16
Payer: COMMERCIAL

## 2020-03-16 VITALS — WEIGHT: 245.4 LBS | SYSTOLIC BLOOD PRESSURE: 120 MMHG | DIASTOLIC BLOOD PRESSURE: 70 MMHG | BODY MASS INDEX: 38.44 KG/M2

## 2020-03-16 DIAGNOSIS — N75.0 BARTHOLIN CYST: Primary | ICD-10-CM

## 2020-03-16 DIAGNOSIS — E28.2 PCOS (POLYCYSTIC OVARIAN SYNDROME): ICD-10-CM

## 2020-03-16 PROCEDURE — 1036F TOBACCO NON-USER: CPT | Performed by: OBSTETRICS & GYNECOLOGY

## 2020-03-16 PROCEDURE — 99212 OFFICE O/P EST SF 10 MIN: CPT | Performed by: OBSTETRICS & GYNECOLOGY

## 2020-03-16 RX ORDER — METFORMIN HYDROCHLORIDE 500 MG/1
1000 TABLET, EXTENDED RELEASE ORAL
Qty: 60 TABLET | Refills: 0 | Status: SHIPPED | OUTPATIENT
Start: 2020-03-16 | End: 2020-04-16

## 2020-03-16 RX ORDER — NORETHINDRONE AND ETHINYL ESTRADIOL 0.8-25(24)
1 KIT ORAL DAILY
COMMUNITY
Start: 2020-03-09 | End: 2020-05-26 | Stop reason: SDUPTHER

## 2020-03-16 NOTE — PROGRESS NOTES
Elvi Pay  1990    S:  35 y o  female here for a follow up  She had a Bartholin gland drained and catheter placed  Drainage is now minimal and she feels well        Past Medical History:   Diagnosis Date    Abnormal Pap smear of cervix     GERD (gastroesophageal reflux disease)     HPV in female     Obesity     PCOS (polycystic ovarian syndrome) 11/2019    Varicella      Family History   Problem Relation Age of Onset    Coronary artery disease Family     Diabetes Family     Drug abuse Mother     Hypertension Father     Diabetes Father     Gestational diabetes Sister     No Known Problems Brother     No Known Problems Brother     No Known Problems Sister     No Known Problems Sister      Social History     Socioeconomic History    Marital status: /Civil Union     Spouse name: Not on file    Number of children: Not on file    Years of education: Not on file    Highest education level: Not on file   Occupational History    Not on file   Social Needs    Financial resource strain: Not on file    Food insecurity:     Worry: Not on file     Inability: Not on file    Transportation needs:     Medical: Not on file     Non-medical: Not on file   Tobacco Use    Smoking status: Never Smoker    Smokeless tobacco: Never Used   Substance and Sexual Activity    Alcohol use: Yes     Frequency: 2-4 times a month     Drinks per session: 1 or 2     Binge frequency: Monthly     Comment: social    Drug use: Never    Sexual activity: Yes     Partners: Male     Birth control/protection: None   Lifestyle    Physical activity:     Days per week: Not on file     Minutes per session: Not on file    Stress: Not on file   Relationships    Social connections:     Talks on phone: Not on file     Gets together: Not on file     Attends [employer] service: Not on file     Active member of club or organization: Not on file     Attends meetings of clubs or organizations: Not on file     Relationship status: Not on file    Intimate partner violence:     Fear of current or ex partner: Not on file     Emotionally abused: Not on file     Physically abused: Not on file     Forced sexual activity: Not on file   Other Topics Concern    Not on file   Social History Narrative    Not on file       O:  /70 (BP Location: Left arm, Patient Position: Sitting, Cuff Size: Large)   Wt 111 kg (245 lb 6 4 oz)   BMI 38 44 kg/m²   She appears well and is in no distress  Abdomen is soft and nontender  External genitals are normal without lesions or rashes  Vagina is normal, no discharge or bleeding noted, no further drainage noted, cyst not palpable  Preston catheter removed intact without difficulty      A/P:  Bartholin Cyst    - catheter removed   - call with recurrence or other symptoms

## 2020-03-19 NOTE — PROGRESS NOTES
Judith Hilton  1990    S:  35 y o  female here for a follow up  Had a left sided bartholin gland drained in the ER with a dunn catheter placed  She continues to have drainage from this, but otherwise feels well  Denies fevers, chills, worsening pain       Past Medical History:   Diagnosis Date    Abnormal Pap smear of cervix     GERD (gastroesophageal reflux disease)     HPV in female     Obesity     PCOS (polycystic ovarian syndrome) 11/2019    Varicella      Family History   Problem Relation Age of Onset    Coronary artery disease Family     Diabetes Family     Drug abuse Mother     Hypertension Father     Diabetes Father     Gestational diabetes Sister     No Known Problems Brother     No Known Problems Brother     No Known Problems Sister     No Known Problems Sister      Social History     Socioeconomic History    Marital status: /Civil Union     Spouse name: Not on file    Number of children: Not on file    Years of education: Not on file    Highest education level: Not on file   Occupational History    Not on file   Social Needs    Financial resource strain: Not on file    Food insecurity:     Worry: Not on file     Inability: Not on file    Transportation needs:     Medical: Not on file     Non-medical: Not on file   Tobacco Use    Smoking status: Never Smoker    Smokeless tobacco: Never Used   Substance and Sexual Activity    Alcohol use: Yes     Frequency: 2-4 times a month     Drinks per session: 1 or 2     Binge frequency: Monthly     Comment: social    Drug use: Never    Sexual activity: Yes     Partners: Male     Birth control/protection: None   Lifestyle    Physical activity:     Days per week: Not on file     Minutes per session: Not on file    Stress: Not on file   Relationships    Social connections:     Talks on phone: Not on file     Gets together: Not on file     Attends [employer] service: Not on file     Active member of club or organization: Not on file     Attends meetings of clubs or organizations: Not on file     Relationship status: Not on file    Intimate partner violence:     Fear of current or ex partner: Not on file     Emotionally abused: Not on file     Physically abused: Not on file     Forced sexual activity: Not on file   Other Topics Concern    Not on file   Social History Narrative    Not on file       O:  /70 (BP Location: Left arm, Patient Position: Sitting, Cuff Size: Standard)   Wt 111 kg (244 lb)   BMI 38 22 kg/m²   She appears well and is in no distress  Abdomen is soft and nontender  External genitals are normal without lesions or rashes  Vagina:  Left sided dunn catheter in place, drainage, no erythema, signs of infection    A/P:  Bartholin Cyst - continue catheter placement for at least another week to allow track to heal   Call if any signs/symptoms infection  RTO  1 week

## 2020-04-16 DIAGNOSIS — E28.2 PCOS (POLYCYSTIC OVARIAN SYNDROME): ICD-10-CM

## 2020-04-16 RX ORDER — METFORMIN HYDROCHLORIDE 500 MG/1
TABLET, EXTENDED RELEASE ORAL
Qty: 60 TABLET | Refills: 0 | Status: SHIPPED | OUTPATIENT
Start: 2020-04-16 | End: 2020-05-15 | Stop reason: SDUPTHER

## 2020-04-22 ENCOUNTER — OFFICE VISIT (OUTPATIENT)
Dept: FAMILY MEDICINE CLINIC | Facility: CLINIC | Age: 30
End: 2020-04-22
Payer: COMMERCIAL

## 2020-04-22 VITALS
HEIGHT: 67 IN | HEART RATE: 102 BPM | BODY MASS INDEX: 38.45 KG/M2 | WEIGHT: 245 LBS | TEMPERATURE: 98.8 F | DIASTOLIC BLOOD PRESSURE: 70 MMHG | RESPIRATION RATE: 16 BRPM | SYSTOLIC BLOOD PRESSURE: 122 MMHG | OXYGEN SATURATION: 97 %

## 2020-04-22 DIAGNOSIS — R51.9 SINUS HEADACHE: ICD-10-CM

## 2020-04-22 DIAGNOSIS — R42 DIZZINESS: ICD-10-CM

## 2020-04-22 DIAGNOSIS — Z23 NEED FOR VACCINATION: ICD-10-CM

## 2020-04-22 DIAGNOSIS — Z00.00 ROUTINE ADULT HEALTH MAINTENANCE: Primary | ICD-10-CM

## 2020-04-22 PROCEDURE — 3008F BODY MASS INDEX DOCD: CPT | Performed by: FAMILY MEDICINE

## 2020-04-22 PROCEDURE — 90471 IMMUNIZATION ADMIN: CPT

## 2020-04-22 PROCEDURE — 99395 PREV VISIT EST AGE 18-39: CPT | Performed by: FAMILY MEDICINE

## 2020-04-22 PROCEDURE — 90715 TDAP VACCINE 7 YRS/> IM: CPT

## 2020-04-22 RX ORDER — MECLIZINE HYDROCHLORIDE 25 MG/1
25 TABLET ORAL 3 TIMES DAILY PRN
Qty: 30 TABLET | Refills: 4 | Status: SHIPPED | OUTPATIENT
Start: 2020-04-22 | End: 2020-11-27 | Stop reason: SDUPTHER

## 2020-04-22 RX ORDER — MECLIZINE HYDROCHLORIDE 25 MG/1
25 TABLET ORAL 3 TIMES DAILY PRN
Qty: 30 TABLET | Refills: 0 | OUTPATIENT
Start: 2020-04-22

## 2020-04-22 RX ORDER — FLUTICASONE PROPIONATE 50 MCG
1 SPRAY, SUSPENSION (ML) NASAL DAILY
Qty: 16 ML | Refills: 4 | Status: SHIPPED | OUTPATIENT
Start: 2020-04-22

## 2020-04-22 RX ORDER — FLUTICASONE PROPIONATE 50 MCG
1 SPRAY, SUSPENSION (ML) NASAL DAILY
Qty: 16 ML | Refills: 0 | OUTPATIENT
Start: 2020-04-22

## 2020-05-15 DIAGNOSIS — E28.2 PCOS (POLYCYSTIC OVARIAN SYNDROME): ICD-10-CM

## 2020-05-15 RX ORDER — METFORMIN HYDROCHLORIDE 500 MG/1
TABLET, EXTENDED RELEASE ORAL
Qty: 60 TABLET | Refills: 0 | Status: SHIPPED | OUTPATIENT
Start: 2020-05-15 | End: 2020-06-11

## 2020-05-26 ENCOUNTER — TELEPHONE (OUTPATIENT)
Dept: OBGYN CLINIC | Facility: CLINIC | Age: 30
End: 2020-05-26

## 2020-05-26 DIAGNOSIS — Z30.011 ORAL CONTRACEPTION INITIAL PRESCRIPTION: Primary | ICD-10-CM

## 2020-05-26 RX ORDER — NORETHINDRONE AND ETHINYL ESTRADIOL 0.8-25(24)
1 KIT ORAL DAILY
Qty: 84 TABLET | Refills: 1 | Status: SHIPPED | OUTPATIENT
Start: 2020-05-26 | End: 2020-11-05 | Stop reason: SDUPTHER

## 2020-06-10 DIAGNOSIS — E28.2 PCOS (POLYCYSTIC OVARIAN SYNDROME): ICD-10-CM

## 2020-06-11 RX ORDER — METFORMIN HYDROCHLORIDE 500 MG/1
TABLET, EXTENDED RELEASE ORAL
Qty: 60 TABLET | Refills: 0 | Status: SHIPPED | OUTPATIENT
Start: 2020-06-11 | End: 2020-07-09

## 2020-07-08 DIAGNOSIS — E28.2 PCOS (POLYCYSTIC OVARIAN SYNDROME): ICD-10-CM

## 2020-07-09 RX ORDER — METFORMIN HYDROCHLORIDE 500 MG/1
TABLET, EXTENDED RELEASE ORAL
Qty: 60 TABLET | Refills: 0 | Status: SHIPPED | OUTPATIENT
Start: 2020-07-09 | End: 2020-08-13

## 2020-08-07 ENCOUNTER — TELEPHONE (OUTPATIENT)
Dept: OBGYN CLINIC | Facility: CLINIC | Age: 30
End: 2020-08-07

## 2020-08-07 NOTE — TELEPHONE ENCOUNTER
Sometimes stressors and new diet may have an effect on menses cycle, advised taking pills same time every days, keep record of irregular bleeding, call to make yrly apt to discuss

## 2020-08-07 NOTE — TELEPHONE ENCOUNTER
Patient started a new diet  She reports irregular menses since starting this diet for last month and half  Is on OCP  What should she do? Will be in a meeting 8/7 10-11 am only  Please call

## 2020-08-13 DIAGNOSIS — E28.2 PCOS (POLYCYSTIC OVARIAN SYNDROME): ICD-10-CM

## 2020-08-13 RX ORDER — METFORMIN HYDROCHLORIDE 500 MG/1
TABLET, EXTENDED RELEASE ORAL
Qty: 60 TABLET | Refills: 0 | Status: SHIPPED | OUTPATIENT
Start: 2020-08-13 | End: 2020-09-11

## 2020-09-01 ENCOUNTER — OFFICE VISIT (OUTPATIENT)
Dept: URGENT CARE | Facility: MEDICAL CENTER | Age: 30
End: 2020-09-01
Payer: COMMERCIAL

## 2020-09-01 VITALS — SYSTOLIC BLOOD PRESSURE: 134 MMHG | DIASTOLIC BLOOD PRESSURE: 87 MMHG | TEMPERATURE: 98 F | HEART RATE: 96 BPM

## 2020-09-01 DIAGNOSIS — J02.9 SORE THROAT: Primary | ICD-10-CM

## 2020-09-01 DIAGNOSIS — R09.81 NASAL CONGESTION: ICD-10-CM

## 2020-09-01 LAB — S PYO AG THROAT QL: NEGATIVE

## 2020-09-01 PROCEDURE — 87880 STREP A ASSAY W/OPTIC: CPT | Performed by: PHYSICIAN ASSISTANT

## 2020-09-01 PROCEDURE — U0003 INFECTIOUS AGENT DETECTION BY NUCLEIC ACID (DNA OR RNA); SEVERE ACUTE RESPIRATORY SYNDROME CORONAVIRUS 2 (SARS-COV-2) (CORONAVIRUS DISEASE [COVID-19]), AMPLIFIED PROBE TECHNIQUE, MAKING USE OF HIGH THROUGHPUT TECHNOLOGIES AS DESCRIBED BY CMS-2020-01-R: HCPCS | Performed by: PHYSICIAN ASSISTANT

## 2020-09-01 PROCEDURE — 99213 OFFICE O/P EST LOW 20 MIN: CPT | Performed by: PHYSICIAN ASSISTANT

## 2020-09-01 NOTE — PROGRESS NOTES
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  Advised isolating until results available  Recommended treatment with Tylenol/Motrin and Mucinex for congestion  Patient Instructions   Tylenol or Motrin for pain  Continue flonase  Try mucinex for congestion  Isolate until results available  Follow up with PCP in 3-5 days  Proceed to  ER if symptoms worsen  Chief Complaint     Chief Complaint   Patient presents with    URI     x2 days sore throat, nasal congestion , swollen glands  taking sudafed that helped yesterday but not today  taking flonase for same         History of Present Illness       Patient is a 67-year-old female who presents today with complaints of nasal congestion, cough, ear pain for the past 2 days  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  She denies any fevers, chills, body aches  No cough or shortness of breath  No known exposure to COVID-19, however she does usually get tested every 2 weeks  She is due to get tested tomorrow  Review of Systems   Review of Systems   Constitutional: Negative for chills and fever  HENT: Positive for congestion, ear pain and sore throat  Respiratory: Negative for cough and shortness of breath  Cardiovascular: Negative for chest pain  Musculoskeletal: Negative for myalgias           Current Medications       Current Outpatient Medications:     albuterol (VENTOLIN HFA) 90 mcg/act inhaler, Inhale 2 puffs every 6 (six) hours as needed for wheezing, Disp: 18 g, Rfl: 0    cetirizine (ZyrTEC) 10 mg tablet, Take 10 mg by mouth daily, Disp: , Rfl:     fluticasone (FLONASE) 50 mcg/act nasal spray, 1 spray into each nostril daily, Disp: 16 mL, Rfl: 4    KAITLIB FE 0 8-25 MG-MCG CHEW, Chew 1 tablet daily Chew, Disp: 84 tablet, Rfl: 1    meclizine (ANTIVERT) 25 mg tablet, Take 1 tablet (25 mg total) by mouth 3 (three) times a day as needed for dizziness, Disp: 30 tablet, Rfl: 4    metFORMIN (GLUCOPHAGE-XR) 500 mg 24 hr tablet, TAKE 2 TABLETS BY MOUTH DAILY WITH DINNER, Disp: 60 tablet, Rfl: 0    Multiple Vitamins-Minerals (MULTIVITAL-M PO), Take by mouth daily, Disp: , Rfl:     omeprazole (PriLOSEC) 40 MG capsule, TAKE 1 CAPSULE EVERY DAY, Disp: , Rfl:     Current Allergies     Allergies as of 09/01/2020 - Reviewed 09/01/2020   Allergen Reaction Noted    Shrimp extract allergy skin test Hives 06/12/2019            The following portions of the patient's history were reviewed and updated as appropriate: allergies, current medications, past family history, past medical history, past social history, past surgical history and problem list      Past Medical History:   Diagnosis Date    Abnormal Pap smear of cervix     GERD (gastroesophageal reflux disease)     HPV in female     Obesity     PCOS (polycystic ovarian syndrome) 11/2019    Varicella        Past Surgical History:   Procedure Laterality Date    WISDOM TOOTH EXTRACTION         Family History   Problem Relation Age of Onset    Coronary artery disease Family     Diabetes Family     Drug abuse Mother     Hypertension Father     Diabetes Father     Gestational diabetes Sister     No Known Problems Brother     No Known Problems Brother     No Known Problems Sister     No Known Problems Sister          Medications have been verified  Objective   /87   Pulse 96   Temp 98 °F (36 7 °C)        Physical Exam     Physical Exam  Constitutional:       General: She is not in acute distress       Appearance: Normal appearance  She is not ill-appearing  HENT:      Head: Normocephalic and atraumatic  Right Ear: Tympanic membrane and ear canal normal       Left Ear: Tympanic membrane and ear canal normal       Nose: Congestion present  Mouth/Throat:      Mouth: Mucous membranes are moist       Pharynx: Oropharynx is clear  Posterior oropharyngeal erythema present  Eyes:      Conjunctiva/sclera: Conjunctivae normal       Pupils: Pupils are equal, round, and reactive to light  Neck:      Musculoskeletal: Normal range of motion and neck supple  Cardiovascular:      Rate and Rhythm: Normal rate and regular rhythm  Pulmonary:      Effort: Pulmonary effort is normal       Breath sounds: Normal breath sounds  Lymphadenopathy:      Cervical: No cervical adenopathy  Skin:     General: Skin is warm and dry  Neurological:      Mental Status: She is alert

## 2020-09-01 NOTE — LETTER
September 1, 2020     Patient: Liberty New   YOB: 1990   Date of Visit: 9/1/2020       To Whom it May Concern:    Cam Myers was seen in my clinic on 9/1/2020  Please excuse from work until results available  If you have any questions or concerns, please don't hesitate to call           Sincerely,          Richa Verduzco PA-C        CC: No Recipients

## 2020-09-02 ENCOUNTER — TELEPHONE (OUTPATIENT)
Dept: FAMILY MEDICINE CLINIC | Facility: CLINIC | Age: 30
End: 2020-09-02

## 2020-09-02 LAB — SARS-COV-2 RNA SPEC QL NAA+PROBE: NOT DETECTED

## 2020-09-02 NOTE — TELEPHONE ENCOUNTER
Patient called stating yesterday she went to [facility]  They just sent her home and have her try motrin and mucinex  She stated that is not helping and now its moving to her chest  She stated when that happens she usually gets an Upper Raspatory Infection  She did call the [facility] for an antibiotic but they told her she would need to contact her PCP  Patient is requesting an antibiotic to be sent  Please advise   Sore Throat, Congestion, Chest Congestion, Cough

## 2020-09-02 NOTE — TELEPHONE ENCOUNTER
Based on their notes, they evaluated her symptoms and believed she has a viral infection, Upper respiratory infections are usually due to viruses, >95% of the time  Antibiotics are not effective for viruses, which is likely why she was not prescribed one and why I don't believe she needs an antibiotic at this time   The treatment for an upper respiratory tract infection would be OTC meds such as Ibuprofen, Mucinex, Flonase to help with the symptoms and usually will resolved within 7-10 days, if her symptoms last longer than that, I recommend she call back and can re-evaluate for another cause or if she develops shortness of breath, fever, etc

## 2020-09-02 NOTE — TELEPHONE ENCOUNTER
Patient phoned asking if there was a response yet to her message  Relayed message from Dr Cathi Baumann as written

## 2020-09-10 DIAGNOSIS — E28.2 PCOS (POLYCYSTIC OVARIAN SYNDROME): ICD-10-CM

## 2020-09-11 RX ORDER — METFORMIN HYDROCHLORIDE 500 MG/1
TABLET, EXTENDED RELEASE ORAL
Qty: 60 TABLET | Refills: 0 | Status: SHIPPED | OUTPATIENT
Start: 2020-09-11 | End: 2020-10-06

## 2020-09-14 ENCOUNTER — OFFICE VISIT (OUTPATIENT)
Dept: OBGYN CLINIC | Facility: MEDICAL CENTER | Age: 30
End: 2020-09-14
Payer: COMMERCIAL

## 2020-09-14 VITALS
WEIGHT: 223.8 LBS | DIASTOLIC BLOOD PRESSURE: 74 MMHG | BODY MASS INDEX: 35.12 KG/M2 | SYSTOLIC BLOOD PRESSURE: 116 MMHG | HEIGHT: 67 IN

## 2020-09-14 DIAGNOSIS — N75.0 BARTHOLIN CYST: Primary | ICD-10-CM

## 2020-09-14 PROCEDURE — 99213 OFFICE O/P EST LOW 20 MIN: CPT | Performed by: STUDENT IN AN ORGANIZED HEALTH CARE EDUCATION/TRAINING PROGRAM

## 2020-09-14 NOTE — ASSESSMENT & PLAN NOTE
Patient report 9-10 recurrences requiring drainage  No signs of overt infection requiring urgent drainage today  Advised surgical management with marsupialization given recurrent symptoms  Reviewed consent including risk, benefits and alternatives  Reviewed risk or recurrence and possible need for cyst excision in the future  Consent signed  Advised soaks and witch hazel for symptom management until surgery  Reviewed precautions of infection to call/present to ER for more urgent drainage

## 2020-09-14 NOTE — H&P (VIEW-ONLY)
Assessment/Plan:    Bartholin cyst  Patient report 9-10 recurrences requiring drainage  No signs of overt infection requiring urgent drainage today  Advised surgical management with marsupialization given recurrent symptoms  Reviewed consent including risk, benefits and alternatives  Reviewed risk or recurrence and possible need for cyst excision in the future  Consent signed  Advised soaks and witch hazel for symptom management until surgery  Reviewed precautions of infection to call/present to ER for more urgent drainage  Diagnoses and all orders for this visit:    Bartholin cyst          Subjective:      Patient ID: Gilford Cranker is a 35 y o  female  36 yo G0 here with recurrent bartholin cyst  She has had left cysts that require drainage 9-10 times per her report  Most recent in March with dunn placement and nice fistula formation  First recurrence  Noted symptoms 10 days ago but was on vacation and couldn't do anything  Has been using witch hazel and started some bactrim she has at home  She is functioning still but getting progressively more uncomfortable- last drainage in March got to a point she couldn't walk so trying to intervene before it reaches that  No fevers chills malaise  No drainage  The following portions of the patient's history were reviewed and updated as appropriate: allergies, current medications, past family history, past medical history, past social history, past surgical history and problem list     Review of Systems   Constitutional: Negative for chills and fever  Respiratory: Negative for shortness of breath  Cardiovascular: Negative for chest pain  Gastrointestinal: Negative for abdominal pain, constipation, diarrhea and nausea  Genitourinary: Positive for vaginal pain  Negative for dyspareunia, dysuria, frequency, urgency, vaginal bleeding and vaginal discharge           Objective:      /74   Ht 5' 7" (1 702 m)   Wt 102 kg (223 lb 12 8 oz)   LMP 08/20/2020 (Exact Date)   Breastfeeding No   BMI 35 05 kg/m²          Physical Exam  Vitals signs reviewed  Constitutional:       General: She is not in acute distress  Appearance: She is well-developed  She is not diaphoretic  HENT:      Head: Normocephalic and atraumatic  Neck:      Musculoskeletal: Normal range of motion  Cardiovascular:      Rate and Rhythm: Normal rate and regular rhythm  Pulmonary:      Effort: Pulmonary effort is normal  No respiratory distress  Breath sounds: Normal breath sounds  Genitourinary:     Labia:         Right: No rash, tenderness, lesion or injury  Left: Tenderness and lesion present  No rash or injury  Neurological:      Mental Status: She is alert and oriented to person, place, and time  Psychiatric:         Behavior: Behavior normal          Thought Content:  Thought content normal          Judgment: Judgment normal

## 2020-09-14 NOTE — PROGRESS NOTES
Assessment/Plan:    Bartholin cyst  Patient report 9-10 recurrences requiring drainage  No signs of overt infection requiring urgent drainage today  Advised surgical management with marsupialization given recurrent symptoms  Reviewed consent including risk, benefits and alternatives  Reviewed risk or recurrence and possible need for cyst excision in the future  Consent signed  Advised soaks and witch hazel for symptom management until surgery  Reviewed precautions of infection to call/present to ER for more urgent drainage  There are no diagnoses linked to this encounter  Subjective:      Patient ID: Venson Severance is a 35 y o  female  36 yo G0 here with recurrent bartholin cyst  She has had left cysts that require drainage 9-10 times per her report  Most recent in March with dunn placement and nice fistula formation  First recurrence  Noted symptoms 10 days ago but was on vacation and couldn't do anything  Has been using witch hazel and started some bactrim she has at home  She is functioning still but getting progressively more uncomfortable- last drainage in March got to a point she couldn't walk so trying to intervene before it reaches that  No fevers chills malaise  No drainage  The following portions of the patient's history were reviewed and updated as appropriate: allergies, current medications, past family history, past medical history, past social history, past surgical history and problem list     Review of Systems   Constitutional: Negative for chills and fever  Respiratory: Negative for shortness of breath  Cardiovascular: Negative for chest pain  Gastrointestinal: Negative for abdominal pain, constipation, diarrhea and nausea  Genitourinary: Positive for vaginal pain  Negative for dyspareunia, dysuria, frequency, urgency, vaginal bleeding and vaginal discharge           Objective:      /74   Ht 5' 7" (1 702 m)   Wt 102 kg (223 lb 12 8 oz)   LMP 08/20/2020 (Exact Date)   Breastfeeding No   BMI 35 05 kg/m²          Physical Exam  Vitals signs reviewed  Constitutional:       General: She is not in acute distress  Appearance: She is well-developed  She is not diaphoretic  HENT:      Head: Normocephalic and atraumatic  Neck:      Musculoskeletal: Normal range of motion  Cardiovascular:      Rate and Rhythm: Normal rate and regular rhythm  Pulmonary:      Effort: Pulmonary effort is normal  No respiratory distress  Breath sounds: Normal breath sounds  Genitourinary:     Labia:         Right: No rash, tenderness, lesion or injury  Left: Tenderness and lesion present  No rash or injury  Neurological:      Mental Status: She is alert and oriented to person, place, and time  Psychiatric:         Behavior: Behavior normal          Thought Content:  Thought content normal          Judgment: Judgment normal

## 2020-09-14 NOTE — LETTER
September 14, 2020     Patient: Audra Garsia   YOB: 1990   Date of Visit: 9/14/2020       To Whom it May Concern:    Crystal Campos is under my professional care  She was seen in my office on 9/14/2020  If you have any questions or concerns, please don't hesitate to call           Sincerely,          Bayron Reyna MD        CC: No Recipients

## 2020-09-16 ENCOUNTER — TELEPHONE (OUTPATIENT)
Dept: OBGYN CLINIC | Facility: CLINIC | Age: 30
End: 2020-09-16

## 2020-09-16 NOTE — PRE-PROCEDURE INSTRUCTIONS
Pre-Surgery Instructions:   Medication Instructions    albuterol (VENTOLIN HFA) 90 mcg/act inhaler Instructed patient per Anesthesia Guidelines   cetirizine (ZyrTEC) 10 mg tablet Instructed patient per Anesthesia Guidelines   fluticasone (FLONASE) 50 mcg/act nasal spray Instructed patient per Anesthesia Guidelines   KAITLIB FE 0 8-25 MG-MCG CHEW Instructed patient per Anesthesia Guidelines   meclizine (ANTIVERT) 25 mg tablet Instructed patient per Anesthesia Guidelines   metFORMIN (GLUCOPHAGE-XR) 500 mg 24 hr tablet Instructed patient per Anesthesia Guidelines   Multiple Vitamins-Minerals (MULTIVITAL-M PO) Instructed patient per Anesthesia Guidelines   omeprazole (PriLOSEC) 40 MG capsule Instructed patient per Anesthesia Guidelines  WILL TAKE BIRTH CONTROL/PMEPR DOS SM SIP H2O  INSTR  ON ASC LOC  ,BRING PHOTO ID/MED LIST/INS  INFO , SHOWER REV , NO ASA/NSAIDS/VIT 1 WEEK PREOP  Pre Procedure Consult Calls    1  Are you currently experiencing symptoms of fever >100 4, cough, or shortness of breath or sore throat? ______YES    ___X__NO   If yes, then please call your PCP immediately for further direction  If you are completing this form on site, please find the safest and most direct route to the nearest exit and avoid close contact with others until you can get further advice from your PCP  2  Have you recently traveled to any foreign country or area within the [place] that has reported cases of COVID-19? ______YES      ___X__NO   IF YES, LIST LOCATION(S): __________________________   3  Have you recently been in contact with someone who is a suspected or confirmed case of COVID-19?   ______ YES   ____X__ No   INSTRUCTED ON NEW COVID VISITOR POLICY- ONLY 1 VISITOR, ALL MUST WEAR MASKS, TEMP WILL BE TAKEN @ DOOR  Inhalational Med Class     Continue to take these inhaler medications on your normal schedule up to and including the day of surgery     Insulin Med Class Pre-Surgery/Procedure Instructions for Adult Patients who Take Medicine for Diabetes or to Control their Blood Sugar     Day Before Surgery/Procedure  Use the directions based on the type of medicine you take for your diabetes  1  If you are having a procedure that does not require a bowel prep:  ? Pre-Mixed Insulin (Intermediate Acting: Humalog 75/25, Humulin 70/30  Novolog 70/30, Regular Insulin)  § Take ½ your regular dose the evening before your procedure  ? Rapid/Fast Acting Insulin/Long Acting Insulin (Humalog U200, NovoLog, Apidra, Lantus, Levemir, Tama Bakes, [place])  § Take your FULL regular dose the day before procedure  ? Oral Diabetic Medicines including Glipizide/Glimepiride/Glucotrol (sulfonylurea)  § Take your regular dose with dinner the evening before your procedure  2  If you are having a procedure (e g  Colonoscopy) that requires a bowel prep and you are allowed to have at least a clear liquid diet:  ? Pre-Mixed Insulin (Intermediate Acting: Humalog 75/25, Humulin 70/30, Novolog 70/30, Regular Insulin)  § Take ½ your regular dose the evening before your procedure  ? Rapid/Fast Acting Insulin (Humalog U200, NovoLog, Apidra, Fiasp)  § Take ½ your regular dose the evening before your procedure  ? Long Acting Insulin (Lantus, Levemir, [place] Bakes)  § Take your FULL regular dose the day before procedure  ? Oral Glipizide/Glimepiride/Glucotrol (sulfonylurea)  § Take ½ your regular dose the evening before your procedure  ? Oral Diabetic Medicines that are NOT Glipizide/Glimepiride/Glucotrol  § Take your regular dose with dinner in the evening before your procedure      Day of Surgery/Procedure  · Long Acting Insulin (Lantus, Levemir, Tama Bakes)  ? If you usually take your Long-Acting Insulin in the morning, take the full dose as scheduled    · With the exception of the morning Long-Acting Insulin noted above, DO NOT take ANY diabetic medicine on the day of your procedure unless you were instructed by the doctor who manages your diabetic medicines  · Continue to check your blood sugars  · If you have an insulin pump then consult with your Endocrinologist for instructions  · If you cannot see your Endocrinologist, on the day of the procedure set your insulin pump to your basal rate only  Please bring your insulin pump supplies to the hospital      This Educational material has been approved by the Patient Education Advisory Committee  Date prepared: 1/17/2018          Expiration date: 1/17/2019        Approval Number:                     Vitamin Med Class     You may continue to take any vitamin that your surgeon has prescribed to you up to the day before surgery  If your surgeon has not specifically prescribed this vitamin or instructed you to continue then stop taking 7 days prior to surgery

## 2020-09-16 NOTE — TELEPHONE ENCOUNTER
Patient has appointment on Friday with Dr Mode Arthur but her bartholin cyst burst and she doesn't know if she needs to keep appointment or not

## 2020-09-17 ENCOUNTER — ANESTHESIA EVENT (OUTPATIENT)
Dept: PERIOP | Facility: HOSPITAL | Age: 30
End: 2020-09-17
Payer: COMMERCIAL

## 2020-09-17 PROBLEM — E28.2 PCOS (POLYCYSTIC OVARIAN SYNDROME): Status: ACTIVE | Noted: 2019-11-01

## 2020-09-18 ENCOUNTER — HOSPITAL ENCOUNTER (OUTPATIENT)
Facility: HOSPITAL | Age: 30
Setting detail: OUTPATIENT SURGERY
Discharge: HOME | End: 2020-09-18
Attending: OBSTETRICS & GYNECOLOGY | Admitting: OBSTETRICS & GYNECOLOGY
Payer: COMMERCIAL

## 2020-09-18 ENCOUNTER — ANESTHESIA (OUTPATIENT)
Dept: PERIOP | Facility: HOSPITAL | Age: 30
End: 2020-09-18
Payer: COMMERCIAL

## 2020-09-18 VITALS
TEMPERATURE: 97.1 F | RESPIRATION RATE: 20 BRPM | OXYGEN SATURATION: 96 % | HEIGHT: 67 IN | WEIGHT: 223 LBS | DIASTOLIC BLOOD PRESSURE: 83 MMHG | BODY MASS INDEX: 35 KG/M2 | HEART RATE: 79 BPM | SYSTOLIC BLOOD PRESSURE: 123 MMHG

## 2020-09-18 VITALS — HEART RATE: 60 BPM

## 2020-09-18 LAB
EXT PREGNANCY TEST URINE: NEGATIVE
EXT. CONTROL: NORMAL

## 2020-09-18 PROCEDURE — 81025 URINE PREGNANCY TEST: CPT | Performed by: OBSTETRICS & GYNECOLOGY

## 2020-09-18 PROCEDURE — 56440 MRSPLZATN BRTHLNS GLND CST: CPT | Performed by: OBSTETRICS & GYNECOLOGY

## 2020-09-18 RX ORDER — DEXAMETHASONE SODIUM PHOSPHATE 10 MG/ML
INJECTION, SOLUTION INTRAMUSCULAR; INTRAVENOUS AS NEEDED
Status: DISCONTINUED | OUTPATIENT
Start: 2020-09-18 | End: 2020-09-18

## 2020-09-18 RX ORDER — KETOROLAC TROMETHAMINE 30 MG/ML
INJECTION, SOLUTION INTRAMUSCULAR; INTRAVENOUS AS NEEDED
Status: DISCONTINUED | OUTPATIENT
Start: 2020-09-18 | End: 2020-09-18

## 2020-09-18 RX ORDER — ONDANSETRON 2 MG/ML
INJECTION INTRAMUSCULAR; INTRAVENOUS AS NEEDED
Status: DISCONTINUED | OUTPATIENT
Start: 2020-09-18 | End: 2020-09-18

## 2020-09-18 RX ORDER — LIDOCAINE HYDROCHLORIDE AND EPINEPHRINE 10; 10 MG/ML; UG/ML
INJECTION, SOLUTION INFILTRATION; PERINEURAL AS NEEDED
Status: DISCONTINUED | OUTPATIENT
Start: 2020-09-18 | End: 2020-09-18 | Stop reason: HOSPADM

## 2020-09-18 RX ORDER — FENTANYL CITRATE/PF 50 MCG/ML
25 SYRINGE (ML) INJECTION
Status: DISCONTINUED | OUTPATIENT
Start: 2020-09-18 | End: 2020-09-18 | Stop reason: HOSPADM

## 2020-09-18 RX ORDER — MECLIZINE HYDROCHLORIDE 25 MG/1
25 TABLET ORAL 3 TIMES DAILY PRN
Status: DISCONTINUED | OUTPATIENT
Start: 2020-09-18 | End: 2020-09-18 | Stop reason: HOSPADM

## 2020-09-18 RX ORDER — MAGNESIUM HYDROXIDE 1200 MG/15ML
LIQUID ORAL AS NEEDED
Status: DISCONTINUED | OUTPATIENT
Start: 2020-09-18 | End: 2020-09-18 | Stop reason: HOSPADM

## 2020-09-18 RX ORDER — ACETAMINOPHEN 325 MG/1
650 TABLET ORAL EVERY 6 HOURS PRN
Status: DISCONTINUED | OUTPATIENT
Start: 2020-09-18 | End: 2020-09-18 | Stop reason: HOSPADM

## 2020-09-18 RX ORDER — LIDOCAINE HYDROCHLORIDE 10 MG/ML
INJECTION, SOLUTION EPIDURAL; INFILTRATION; INTRACAUDAL; PERINEURAL AS NEEDED
Status: DISCONTINUED | OUTPATIENT
Start: 2020-09-18 | End: 2020-09-18

## 2020-09-18 RX ORDER — IBUPROFEN 600 MG/1
600 TABLET ORAL EVERY 6 HOURS PRN
Status: DISCONTINUED | OUTPATIENT
Start: 2020-09-18 | End: 2020-09-18 | Stop reason: HOSPADM

## 2020-09-18 RX ORDER — SCOLOPAMINE TRANSDERMAL SYSTEM 1 MG/1
1 PATCH, EXTENDED RELEASE TRANSDERMAL
Status: DISCONTINUED | OUTPATIENT
Start: 2020-09-18 | End: 2020-09-18

## 2020-09-18 RX ORDER — PROPOFOL 10 MG/ML
INJECTION, EMULSION INTRAVENOUS AS NEEDED
Status: DISCONTINUED | OUTPATIENT
Start: 2020-09-18 | End: 2020-09-18

## 2020-09-18 RX ORDER — ALBUTEROL SULFATE 90 UG/1
2 AEROSOL, METERED RESPIRATORY (INHALATION) EVERY 6 HOURS PRN
Status: DISCONTINUED | OUTPATIENT
Start: 2020-09-18 | End: 2020-09-18 | Stop reason: HOSPADM

## 2020-09-18 RX ORDER — FLUTICASONE PROPIONATE 50 MCG
1 SPRAY, SUSPENSION (ML) NASAL DAILY
Status: DISCONTINUED | OUTPATIENT
Start: 2020-09-18 | End: 2020-09-18 | Stop reason: HOSPADM

## 2020-09-18 RX ORDER — SODIUM CHLORIDE, SODIUM LACTATE, POTASSIUM CHLORIDE, CALCIUM CHLORIDE 600; 310; 30; 20 MG/100ML; MG/100ML; MG/100ML; MG/100ML
125 INJECTION, SOLUTION INTRAVENOUS CONTINUOUS
Status: DISCONTINUED | OUTPATIENT
Start: 2020-09-18 | End: 2020-09-18 | Stop reason: HOSPADM

## 2020-09-18 RX ORDER — MIDAZOLAM HYDROCHLORIDE 2 MG/2ML
INJECTION, SOLUTION INTRAMUSCULAR; INTRAVENOUS AS NEEDED
Status: DISCONTINUED | OUTPATIENT
Start: 2020-09-18 | End: 2020-09-18

## 2020-09-18 RX ORDER — FENTANYL CITRATE 50 UG/ML
INJECTION, SOLUTION INTRAMUSCULAR; INTRAVENOUS AS NEEDED
Status: DISCONTINUED | OUTPATIENT
Start: 2020-09-18 | End: 2020-09-18

## 2020-09-18 RX ADMIN — SODIUM CHLORIDE, SODIUM LACTATE, POTASSIUM CHLORIDE, AND CALCIUM CHLORIDE 125 ML/HR: .6; .31; .03; .02 INJECTION, SOLUTION INTRAVENOUS at 08:27

## 2020-09-18 RX ADMIN — SODIUM CHLORIDE, SODIUM LACTATE, POTASSIUM CHLORIDE, AND CALCIUM CHLORIDE 125 ML/HR: .6; .31; .03; .02 INJECTION, SOLUTION INTRAVENOUS at 10:09

## 2020-09-18 RX ADMIN — Medication 25 MCG: at 10:00

## 2020-09-18 RX ADMIN — PROPOFOL 200 MG: 10 INJECTION, EMULSION INTRAVENOUS at 08:57

## 2020-09-18 RX ADMIN — ONDANSETRON 4 MG: 2 INJECTION INTRAMUSCULAR; INTRAVENOUS at 08:57

## 2020-09-18 RX ADMIN — MIDAZOLAM 2 MG: 1 INJECTION INTRAMUSCULAR; INTRAVENOUS at 08:49

## 2020-09-18 RX ADMIN — FENTANYL CITRATE 50 MCG: 50 INJECTION, SOLUTION INTRAMUSCULAR; INTRAVENOUS at 08:57

## 2020-09-18 RX ADMIN — SCOPALAMINE 1 PATCH: 1 PATCH, EXTENDED RELEASE TRANSDERMAL at 08:47

## 2020-09-18 RX ADMIN — KETOROLAC TROMETHAMINE 30 MG: 30 INJECTION, SOLUTION INTRAMUSCULAR at 09:07

## 2020-09-18 RX ADMIN — FENTANYL CITRATE 50 MCG: 50 INJECTION, SOLUTION INTRAMUSCULAR; INTRAVENOUS at 08:49

## 2020-09-18 RX ADMIN — LIDOCAINE HYDROCHLORIDE 100 MG: 10 INJECTION, SOLUTION EPIDURAL; INFILTRATION; INTRACAUDAL; PERINEURAL at 08:57

## 2020-09-18 RX ADMIN — DEXAMETHASONE SODIUM PHOSPHATE 10 MG: 10 INJECTION, SOLUTION INTRAMUSCULAR; INTRAVENOUS at 08:57

## 2020-09-18 RX ADMIN — IBUPROFEN 600 MG: 600 TABLET, FILM COATED ORAL at 11:06

## 2020-09-18 NOTE — ANESTHESIA POSTPROCEDURE EVALUATION
Post-Op Assessment Note    CV Status:  Stable  Pain Score: 0    Pain management: adequate     Mental Status:  Alert and awake   Hydration Status:  Euvolemic and stable   PONV Controlled:  None   Airway Patency:  Patent      Post Op Vitals Reviewed: Yes      Staff: CRNA         No complications documented      /57 (09/18/20 0936)    Temp      Pulse 75 (09/18/20 0936)   Resp 18 (09/18/20 0936)    SpO2 100 % (09/18/20 0936)

## 2020-09-18 NOTE — DISCHARGE INSTRUCTIONS
You have a small amount of packing in place- if it falls out on its own that is fine  Please call with any complaints of fevers, chills, severe pain in the area, excessive swelling in the area operated on and if you are experiencing heavy bleeding from the area- saturating a pad in an hour  The may use Motrin and Tylenol for pain  You can continue Sitz baths and ice packs for any discomfort you have  Bartholin Cyst   WHAT YOU NEED TO KNOW:   What is a Bartholin cyst?  A Bartholin cyst is a lump near the opening to your vagina  You may have pain in this area when you walk or have sex  A Bartholin cyst is caused by blockage of your Bartholin gland  You have a Bartholin gland on each side of your vagina  The glands produce fluid to moisten your vagina  Over time the fluid can build up in the gland and form a cyst  The cyst may become infected  You may be at risk for a Bartholin cyst if you have a sexually transmitted infection  An injury or surgery near your vagina may also increase you risk  How is a Bartholin cyst diagnosed and treated? Your healthcare provider will examine your vagina  A sample of fluid from your vagina may be collected  The fluid can be tested for sexually transmitted infections  Your healthcare provider may tell you how to treat your cyst at home  Instead, you may need any of the following:  · Medicine  may be given to treat or prevent an infection  Medicine may also be given to decrease pain and swelling  · Incision and drainage  is a procedure to drain the cyst  Your healthcare provider will make an opening in the cyst so it can drain  He or she may also place packing or a drain in your wound  The drain will help keep your gland open and drain extra fluid  The packing will be removed in 24 to 48 hours  The drain may be left in place for 4 to 6 weeks  · Surgery  may be needed if other treatments do not work   Surgery may be done to hold your gland open and prevent another blockage  Surgery may also be done to remove 1 or both of your Bartholin glands  What can I do to care for myself if my cyst is not drained? · Take a sitz bath  3 to 4 times each day or as directed  A sitz bath may help relieve swelling and pain  It will also help open your Bartholin glands so they drain normally  Place a clean towel on the bottom of your bath tub  Fill your bath tub with warm water up to your hips  You can also buy a sitz bath that fits in your toilet  Sit in the water for 10 minutes  · Apply a warm compress  to your cyst  This may relieve swelling and pain  A warm compress will also help open your Bartholin glands so they drain normally  Wet a washcloth in warm, but not hot, water  Apply the compress for 10 minutes  Repeat 4 times each day  · Keep the area around your vagina clean  Always wipe front to back  Shower once a day  Gently pat the area dry after a shower  What can I do to care for myself after my cyst is drained? · Take a sitz bath  in 24 to 48 hours or as directed  You may need to wait to take a sitz bath until after your packing is removed  A sitz bath may help relieve swelling and pain  Take a sitz bath 3 to 4 times each day for 3 days  Place a clean towel on the bottom of your bath tub  Fill your bath tub with warm water up to your hips  You can also buy a sitz bath that fits in your toilet  Sit in the water for 10 minutes  · Wear a sanitary pad  to absorb drainage from your wound  You may have drainage for a few weeks after your cyst is drained  · Ask your healthcare provider if it is okay to have sex  Sex may cause your drain to fall out  It may also increase your risk for an infection  · Keep the area around your vagina clean  Always wipe front to back  Shower once a day  Gently pat the area dry after a shower  When should I contact my gynecologist or healthcare provider? · You have a fever       · Your cyst gets larger or becomes more painful  · Your cyst returns after treatment  · Your drain falls out  · You have pus, redness, or swelling where the cyst was drained  · You have questions or concerns about your condition or care  CARE AGREEMENT:   You have the right to help plan your care  Learn about your health condition and how it may be treated  Discuss treatment options with your caregivers to decide what care you want to receive  You always have the right to refuse treatment  The above information is an  only  It is not intended as medical advice for individual conditions or treatments  Talk to your doctor, nurse or pharmacist before following any medical regimen to see if it is safe and effective for you  © 2017 [facility] Information is for End User's use only and may not be sold, redistributed or otherwise used for commercial purposes  All illustrations and images included in CareNotes® are the copyrighted property of A D A M [employer]  or [employer]

## 2020-09-18 NOTE — OP NOTE
OPERATIVE REPORT  PATIENT NAME: Galen Nunez    :  1990  MRN: [id number]  Pt Location:  OR ROOM 07    SURGERY DATE: 2020    Surgeon(s) and Role:     * Kamari Foss MD - Primary     * Sridevi Gay MD - Assisting    Preop Diagnosis:  Bartholin cyst [N75 0]    Post-Op Diagnosis Codes: * Bartholin cyst [N75 0]    Procedure(s) (LRB):  MARSUPILIZATION BARTHOLIN CYST (N/A)  EXAM UNDER ANESTHESIA (EUA) (N/A)    Specimen(s):  * No specimens in log *    Estimated Blood Loss:   10 cc    Anesthesia Type:   General endotracheal    Operative Indications:  Recurrent Bartholin cyst [N75 0]    Operative Findings:  1  External exam the vulva appears grossly normal and the left bartholin gland cyst mostly drained   2  A small punctate opening on the left labia minora noted and cyst palpated    Complications:   None    Brief History  27-year-old female with recurrent Bartholin cyst for marsupialization  All risks, benefits, and alternatives to the procedure were discussed with the patient and she had the opportunity to ask questions  Informed consent was obtained  Description of Procedure    Patient was taken to the operating room were a time out was performed to confirm correct patient and correct procedure  General endotracheal anesthesia (GET) was administered and the patient was positioned on the OR table in the dorsal lithotomy position  All pressure points were padded and a danis hugger was placed to maintain control of core body temperature  A bimanual exam was performed and the uterus was noted to be anteverted, normal in size and consistency with no palpable adnexal masses or fullness  The patient was prepped and draped in the usual sterile fashion  Operative Technique  The left labia was grasped with a Allis clamp and expose the site of the Birthing gland cyst   The punctate opening was incised with a surgical scalpel all approximately 1 5 cm    The Bartholin cyst wall was grasped with a Iveth clamp and incised with a surgical scalpel  The cyst wall was then sutured to the epithelium with 3-20 vicryl in a running locked suture  The cyst wall was then circumferentially infiltrated with approximately 2 cc of lidocaine with epinephrine  Approximately 2-3cm of 1/4 plain packing was packed in to the Bartholin cyst   Excellent hemostasis was noted thereafter  At the conclusion of the procedure, all needle, sponge, and instrument counts were noted to be correct x2  Patient tolerated the procedure well and was transferred to PACU in stable condition prior to discharge with follow up in 1-2 weeks  Dr Dayanara Duval was present and participated in all key portions of the case        Patient Disposition:  PACU     SIGNATURE: Zena Levy MD  DATE: September 18, 2020  TIME: 9:37 AM

## 2020-09-18 NOTE — LETTER
[id number] [address]  Dept: [phone]    September 18, 2020     Patient: Ricardo López   YOB: 1990   Date of Visit: 9/14/2020       To Whom it May Concern:    Luis Antonio Abel is under my professional care  She was seen in the hospital on  09/18/20  She may return to work on Monday, 9/21/2020 without limitations  If you have any questions or concerns, please don't hesitate to call           Sincerely,          Paige Saldivar MD

## 2020-09-18 NOTE — INTERVAL H&P NOTE
H&P reviewed  After examining the patient I find no changes in the patients condition since the H&P had been written  Cyst has ruptured since office visit, but tract still visible per patient  Anticipate being able to complete procedure as planned       Vitals:    09/18/20 0801   BP: 137/71   Pulse: 87   Resp: 20   Temp: 97 8 °F (36 6 °C)   SpO2: 97%

## 2020-09-18 NOTE — ANESTHESIA PREPROCEDURE EVALUATION
Procedure:  MARSUPILIZATION BARTHOLIN CYST (N/A Perineum)  EXAM UNDER ANESTHESIA (EUA) (N/A Vagina )    Relevant Problems   GI/HEPATIC   (+) GERD (gastroesophageal reflux disease)      Other   (+) Bartholin cyst   (+) Class 3 severe obesity in adult [facility])      Asthma/seasonal allergies  DM  Vertigo - on meclizine         Anesthesia Plan  ASA Score- 3     Anesthesia Type- general with ASA Monitors  Additional Monitors:   Airway Plan:           Plan Factors-    Chart reviewed  Patient is not a current smoker  Induction- intravenous      Postoperative Plan-   Planned trial extubation    Informed Consent-

## 2020-09-25 ENCOUNTER — ANNUAL EXAM (OUTPATIENT)
Dept: OBGYN CLINIC | Facility: MEDICAL CENTER | Age: 30
End: 2020-09-25
Payer: COMMERCIAL

## 2020-09-25 VITALS — WEIGHT: 221 LBS | DIASTOLIC BLOOD PRESSURE: 60 MMHG | SYSTOLIC BLOOD PRESSURE: 122 MMHG | BODY MASS INDEX: 34.61 KG/M2

## 2020-09-25 DIAGNOSIS — Z01.419 ENCOUNTER FOR GYNECOLOGICAL EXAMINATION (GENERAL) (ROUTINE) WITHOUT ABNORMAL FINDINGS: Primary | ICD-10-CM

## 2020-09-25 PROCEDURE — 99395 PREV VISIT EST AGE 18-39: CPT | Performed by: OBSTETRICS & GYNECOLOGY

## 2020-09-25 PROCEDURE — 1036F TOBACCO NON-USER: CPT | Performed by: OBSTETRICS & GYNECOLOGY

## 2020-09-25 NOTE — PROGRESS NOTES
Claudetta Maria  1990    CC:  Yearly exam    S:  35 y o  female here for yearly exam  Her cycles are regular, not heavy or crampy  Started a keto diet - had irregular bleeding, but this has normalized  Has lost 20lbs in last year  She is s/p marsupialization of Bartholin Cyst 1 week ago today  She is feeling well  Sexual activity: She is sexually active without bleeding or dryness  Does have some occasional right sided pain  Contraception:  She uses Kaitlib (chewable OCP) for contraception   is not ready for children, so they are not trying anytime soon  STD testing:  She does not request STD testing today  Had done 3/2020   and monogamous  Gardasil:  She has not had the Gardasil series  We reviewed ASCCP guidelines for Pap testing       Last Pap 8/5/19 - Normal Cytology    Family hx of breast cancer: no  Family hx of ovarian cancer: no  Family hx of colon cancer: no      Current Outpatient Medications:     albuterol (VENTOLIN HFA) 90 mcg/act inhaler, Inhale 2 puffs every 6 (six) hours as needed for wheezing, Disp: 18 g, Rfl: 0    cetirizine (ZyrTEC) 10 mg tablet, Take 10 mg by mouth daily as needed , Disp: , Rfl:     fluticasone (FLONASE) 50 mcg/act nasal spray, 1 spray into each nostril daily, Disp: 16 mL, Rfl: 4    KAITLIB FE 0 8-25 MG-MCG CHEW, Chew 1 tablet daily Chew, Disp: 84 tablet, Rfl: 1    meclizine (ANTIVERT) 25 mg tablet, Take 1 tablet (25 mg total) by mouth 3 (three) times a day as needed for dizziness, Disp: 30 tablet, Rfl: 4    metFORMIN (GLUCOPHAGE-XR) 500 mg 24 hr tablet, TAKE 2 TABLETS BY MOUTH DAILY WITH DINNER, Disp: 60 tablet, Rfl: 0    Multiple Vitamins-Minerals (MULTIVITAL-M PO), Take by mouth daily, Disp: , Rfl:     omeprazole (PriLOSEC) 40 MG capsule, Take 20 mg by mouth daily , Disp: , Rfl:   Social History     Socioeconomic History    Marital status: /Civil Union     Spouse name: Not on file    Number of children: Not on file    Years of education: Not on file    Highest education level: Not on file   Occupational History    Not on file   Social Needs    Financial resource strain: Not on file    Food insecurity     Worry: Not on file     Inability: Not on file    Transportation needs     Medical: Not on file     Non-medical: Not on file   Tobacco Use    Smoking status: Never Smoker    Smokeless tobacco: Never Used   Substance and Sexual Activity    Alcohol use: Yes     Frequency: 2-4 times a month     Drinks per session: 1 or 2     Binge frequency: Monthly     Comment: social    Drug use: Never    Sexual activity: Yes     Partners: Male     Birth control/protection: OCP   Lifestyle    Physical activity     Days per week: Not on file     Minutes per session: Not on file    Stress: Not on file   Relationships    Social connections     Talks on phone: Not on file     Gets together: Not on file     Attends Religion service: Not on file     Active member of club or organization: Not on file     Attends meetings of clubs or organizations: Not on file     Relationship status: Not on file    Intimate partner violence     Fear of current or ex partner: Not on file     Emotionally abused: Not on file     Physically abused: Not on file     Forced sexual activity: Not on file   Other Topics Concern    Not on file   Social History Narrative    Not on file     Family History   Problem Relation Age of Onset    Coronary artery disease Family     Diabetes Family     Drug abuse Mother     Hypertension Father     Diabetes Father     Gestational diabetes Sister     No Known Problems Brother     No Known Problems Brother     No Known Problems Sister     No Known Problems Sister      Past Medical History:   Diagnosis Date    Abnormal Pap smear of cervix     Bartholin cyst     GERD (gastroesophageal reflux disease)     HPV in female     Obesity     PCOS (polycystic ovarian syndrome) 11/2019    Varicella Review of Systems   Respiratory: Negative  Cardiovascular: Negative  Gastrointestinal: Negative for constipation and diarrhea  Genitourinary: Negative for difficulty urinating, pelvic pain, vaginal bleeding, vaginal discharge, itching or odor  O:  Blood pressure 122/60, weight 100 kg (221 lb), not currently breastfeeding  Patient appears well and is not in distress  Breasts are symmetrical without mass, tenderness, nipple discharge, skin changes or adenopathy  Abdomen is soft and nontender without masses  External genitals are normal without lesions or rashes  Left bartholin - packing removed, no signs infection s/p marsupialization  Urethra and urethral meatus are normal  Bladder is normal to palpation  Vagina is normal without discharge or bleeding  Cervix is normal without discharge or lesion  Uterus is normal, mobile, nontender without palpable mass  Adnexa are normal, nontender, without palpable mass  A:  Yearly exam      P:   Pap and HPV next year   Call with any recurrence of Bartholins   Continue OCP/metformin  RTO one year for yearly exam or sooner as needed

## 2020-10-06 DIAGNOSIS — E28.2 PCOS (POLYCYSTIC OVARIAN SYNDROME): ICD-10-CM

## 2020-10-06 RX ORDER — METFORMIN HYDROCHLORIDE 500 MG/1
TABLET, EXTENDED RELEASE ORAL
Qty: 60 TABLET | Refills: 0 | Status: SHIPPED | OUTPATIENT
Start: 2020-10-06 | End: 2020-11-03 | Stop reason: SDUPTHER

## 2020-11-03 DIAGNOSIS — E28.2 PCOS (POLYCYSTIC OVARIAN SYNDROME): ICD-10-CM

## 2020-11-03 RX ORDER — METFORMIN HYDROCHLORIDE 500 MG/1
TABLET, EXTENDED RELEASE ORAL
Qty: 60 TABLET | Refills: 0 | Status: SHIPPED | OUTPATIENT
Start: 2020-11-03 | End: 2020-11-27

## 2020-11-04 ENCOUNTER — OFFICE VISIT (OUTPATIENT)
Dept: FAMILY MEDICINE CLINIC | Facility: CLINIC | Age: 30
End: 2020-11-04
Payer: COMMERCIAL

## 2020-11-04 VITALS
SYSTOLIC BLOOD PRESSURE: 122 MMHG | TEMPERATURE: 98 F | BODY MASS INDEX: 34.84 KG/M2 | HEART RATE: 75 BPM | HEIGHT: 67 IN | WEIGHT: 222 LBS | DIASTOLIC BLOOD PRESSURE: 84 MMHG | OXYGEN SATURATION: 98 %

## 2020-11-04 DIAGNOSIS — K64.1 GRADE II HEMORRHOIDS: Primary | ICD-10-CM

## 2020-11-04 DIAGNOSIS — Z30.011 ORAL CONTRACEPTION INITIAL PRESCRIPTION: ICD-10-CM

## 2020-11-04 DIAGNOSIS — Z76.89 ENCOUNTER TO ESTABLISH CARE: ICD-10-CM

## 2020-11-04 PROCEDURE — 3008F BODY MASS INDEX DOCD: CPT | Performed by: NURSE PRACTITIONER

## 2020-11-04 PROCEDURE — 99214 OFFICE O/P EST MOD 30 MIN: CPT | Performed by: NURSE PRACTITIONER

## 2020-11-04 PROCEDURE — 3725F SCREEN DEPRESSION PERFORMED: CPT | Performed by: NURSE PRACTITIONER

## 2020-11-04 PROCEDURE — 1036F TOBACCO NON-USER: CPT | Performed by: NURSE PRACTITIONER

## 2020-11-05 ENCOUNTER — TELEPHONE (OUTPATIENT)
Dept: OTHER | Facility: OTHER | Age: 30
End: 2020-11-05

## 2020-11-05 DIAGNOSIS — Z30.011 ORAL CONTRACEPTION INITIAL PRESCRIPTION: ICD-10-CM

## 2020-11-05 RX ORDER — NORETHINDRONE AND ETHINYL ESTRADIOL 0.8-25(24)
1 KIT ORAL DAILY
Qty: 84 TABLET | Refills: 1 | Status: SHIPPED | OUTPATIENT
Start: 2020-11-05 | End: 2021-04-28 | Stop reason: SDUPTHER

## 2020-11-05 RX ORDER — NORETHINDRONE AND ETHINYL ESTRADIOL 0.8-25(24)
1 KIT ORAL DAILY
Qty: 84 TABLET | Refills: 3 | Status: SHIPPED | OUTPATIENT
Start: 2020-11-05 | End: 2021-08-06

## 2020-11-26 DIAGNOSIS — E28.2 PCOS (POLYCYSTIC OVARIAN SYNDROME): ICD-10-CM

## 2020-11-27 DIAGNOSIS — R42 DIZZINESS: ICD-10-CM

## 2020-11-27 RX ORDER — MECLIZINE HYDROCHLORIDE 25 MG/1
25 TABLET ORAL 3 TIMES DAILY PRN
Qty: 30 TABLET | Refills: 1 | Status: SHIPPED | OUTPATIENT
Start: 2020-11-27 | End: 2021-06-20 | Stop reason: SDUPTHER

## 2020-11-27 RX ORDER — METFORMIN HYDROCHLORIDE 500 MG/1
TABLET, EXTENDED RELEASE ORAL
Qty: 60 TABLET | Refills: 0 | Status: SHIPPED | OUTPATIENT
Start: 2020-11-27 | End: 2020-12-23

## 2020-11-27 RX ORDER — MECLIZINE HYDROCHLORIDE 25 MG/1
25 TABLET ORAL 3 TIMES DAILY PRN
Qty: 30 TABLET | Refills: 0 | OUTPATIENT
Start: 2020-11-27

## 2020-11-27 RX ORDER — MECLIZINE HYDROCHLORIDE 25 MG/1
25 TABLET ORAL 3 TIMES DAILY PRN
Qty: 30 TABLET | Refills: 0 | Status: SHIPPED | OUTPATIENT
Start: 2020-11-27 | End: 2020-11-27 | Stop reason: SDUPTHER

## 2020-12-23 DIAGNOSIS — E28.2 PCOS (POLYCYSTIC OVARIAN SYNDROME): ICD-10-CM

## 2020-12-23 RX ORDER — METFORMIN HYDROCHLORIDE 500 MG/1
TABLET, EXTENDED RELEASE ORAL
Qty: 60 TABLET | Refills: 0 | Status: SHIPPED | OUTPATIENT
Start: 2020-12-23 | End: 2021-01-21

## 2020-12-30 ENCOUNTER — TELEMEDICINE (OUTPATIENT)
Dept: FAMILY MEDICINE CLINIC | Facility: CLINIC | Age: 30
End: 2020-12-30
Payer: COMMERCIAL

## 2020-12-30 DIAGNOSIS — B34.9 VIRAL INFECTION, UNSPECIFIED: ICD-10-CM

## 2020-12-30 DIAGNOSIS — J01.00 ACUTE NON-RECURRENT MAXILLARY SINUSITIS: Primary | ICD-10-CM

## 2020-12-30 DIAGNOSIS — Z20.822 EXPOSURE TO COVID-19 VIRUS: ICD-10-CM

## 2020-12-30 PROCEDURE — 99213 OFFICE O/P EST LOW 20 MIN: CPT | Performed by: NURSE PRACTITIONER

## 2020-12-30 PROCEDURE — U0003 INFECTIOUS AGENT DETECTION BY NUCLEIC ACID (DNA OR RNA); SEVERE ACUTE RESPIRATORY SYNDROME CORONAVIRUS 2 (SARS-COV-2) (CORONAVIRUS DISEASE [COVID-19]), AMPLIFIED PROBE TECHNIQUE, MAKING USE OF HIGH THROUGHPUT TECHNOLOGIES AS DESCRIBED BY CMS-2020-01-R: HCPCS | Performed by: NURSE PRACTITIONER

## 2020-12-30 PROCEDURE — 1036F TOBACCO NON-USER: CPT | Performed by: NURSE PRACTITIONER

## 2020-12-30 RX ORDER — AMOXICILLIN AND CLAVULANATE POTASSIUM 875; 125 MG/1; MG/1
1 TABLET, FILM COATED ORAL EVERY 12 HOURS SCHEDULED
Qty: 14 TABLET | Refills: 0 | Status: SHIPPED | OUTPATIENT
Start: 2020-12-30 | End: 2021-01-06

## 2021-01-01 LAB — SARS-COV-2 RNA SPEC QL NAA+PROBE: NOT DETECTED

## 2021-01-04 ENCOUNTER — TELEPHONE (OUTPATIENT)
Dept: FAMILY MEDICINE CLINIC | Facility: CLINIC | Age: 31
End: 2021-01-04

## 2021-01-04 DIAGNOSIS — B34.9 VIRAL INFECTION, UNSPECIFIED: ICD-10-CM

## 2021-01-04 DIAGNOSIS — B34.9 VIRAL INFECTION, UNSPECIFIED: Primary | ICD-10-CM

## 2021-01-04 PROCEDURE — U0003 INFECTIOUS AGENT DETECTION BY NUCLEIC ACID (DNA OR RNA); SEVERE ACUTE RESPIRATORY SYNDROME CORONAVIRUS 2 (SARS-COV-2) (CORONAVIRUS DISEASE [COVID-19]), AMPLIFIED PROBE TECHNIQUE, MAKING USE OF HIGH THROUGHPUT TECHNOLOGIES AS DESCRIBED BY CMS-2020-01-R: HCPCS | Performed by: NURSE PRACTITIONER

## 2021-01-04 NOTE — TELEPHONE ENCOUNTER
Patient got tested for COVID on 12/30/2020 and it came back negative  She went back to work today and they did a rapid test on her for COVID and it came back positive   They are aware it might be a false positive but her work is asking if you could order another COVID test?

## 2021-01-06 LAB — SARS-COV-2 RNA SPEC QL NAA+PROBE: DETECTED

## 2021-01-20 DIAGNOSIS — E28.2 PCOS (POLYCYSTIC OVARIAN SYNDROME): ICD-10-CM

## 2021-01-21 RX ORDER — METFORMIN HYDROCHLORIDE 500 MG/1
TABLET, EXTENDED RELEASE ORAL
Qty: 60 TABLET | Refills: 0 | Status: SHIPPED | OUTPATIENT
Start: 2021-01-21 | End: 2021-02-04

## 2021-02-03 DIAGNOSIS — Z30.011 ORAL CONTRACEPTION INITIAL PRESCRIPTION: ICD-10-CM

## 2021-02-03 RX ORDER — NORETHINDRONE AND ETHINYL ESTRADIOL 0.8-25(24)
1 KIT ORAL DAILY
Qty: 84 TABLET | Refills: 0 | Status: CANCELLED | OUTPATIENT
Start: 2021-02-03

## 2021-02-04 DIAGNOSIS — E28.2 PCOS (POLYCYSTIC OVARIAN SYNDROME): ICD-10-CM

## 2021-02-04 RX ORDER — METFORMIN HYDROCHLORIDE 500 MG/1
TABLET, EXTENDED RELEASE ORAL
Qty: 180 TABLET | Refills: 1 | Status: SHIPPED | OUTPATIENT
Start: 2021-02-04 | End: 2021-09-14 | Stop reason: SDUPTHER

## 2021-03-31 DIAGNOSIS — Z23 ENCOUNTER FOR IMMUNIZATION: ICD-10-CM

## 2021-04-24 ENCOUNTER — IMMUNIZATIONS (OUTPATIENT)
Dept: FAMILY MEDICINE CLINIC | Facility: HOSPITAL | Age: 31
End: 2021-04-24

## 2021-04-24 DIAGNOSIS — Z23 ENCOUNTER FOR IMMUNIZATION: Primary | ICD-10-CM

## 2021-04-24 PROCEDURE — 91300 SARS-COV-2 / COVID-19 MRNA VACCINE (PFIZER-BIONTECH) 30 MCG: CPT

## 2021-04-24 PROCEDURE — 0001A SARS-COV-2 / COVID-19 MRNA VACCINE (PFIZER-BIONTECH) 30 MCG: CPT

## 2021-04-28 DIAGNOSIS — Z30.011 ORAL CONTRACEPTION INITIAL PRESCRIPTION: ICD-10-CM

## 2021-04-29 RX ORDER — NORETHINDRONE AND ETHINYL ESTRADIOL 0.8-25(24)
1 KIT ORAL DAILY
Qty: 84 TABLET | Refills: 1 | Status: SHIPPED | OUTPATIENT
Start: 2021-04-29 | End: 2021-10-08 | Stop reason: SDUPTHER

## 2021-05-15 ENCOUNTER — IMMUNIZATIONS (OUTPATIENT)
Dept: FAMILY MEDICINE CLINIC | Facility: HOSPITAL | Age: 31
End: 2021-05-15

## 2021-05-15 DIAGNOSIS — Z23 ENCOUNTER FOR IMMUNIZATION: Primary | ICD-10-CM

## 2021-05-15 PROCEDURE — 91300 SARS-COV-2 / COVID-19 MRNA VACCINE (PFIZER-BIONTECH) 30 MCG: CPT

## 2021-05-15 PROCEDURE — 0002A SARS-COV-2 / COVID-19 MRNA VACCINE (PFIZER-BIONTECH) 30 MCG: CPT

## 2021-06-07 ENCOUNTER — OFFICE VISIT (OUTPATIENT)
Dept: URGENT CARE | Facility: MEDICAL CENTER | Age: 31
End: 2021-06-07
Payer: COMMERCIAL

## 2021-06-07 VITALS — OXYGEN SATURATION: 97 % | HEART RATE: 91 BPM | TEMPERATURE: 98.3 F

## 2021-06-07 DIAGNOSIS — J40 BRONCHITIS: Primary | ICD-10-CM

## 2021-06-07 PROCEDURE — 99213 OFFICE O/P EST LOW 20 MIN: CPT | Performed by: PHYSICIAN ASSISTANT

## 2021-06-07 RX ORDER — AZITHROMYCIN 250 MG/1
TABLET, FILM COATED ORAL
Qty: 6 TABLET | Refills: 0 | Status: SHIPPED | OUTPATIENT
Start: 2021-06-07 | End: 2021-06-11

## 2021-06-07 NOTE — PROGRESS NOTES
[facility]        NAME: Cici Leonard is a 28 y o  female  : 1990    MRN: [id number]  DATE: 2021  TIME: 8:39 AM    Assessment and Plan   Bronchitis [J40]  1  Bronchitis  azithromycin (ZITHROMAX) 250 mg tablet     Patient already vaccinated  Will treat with Z-tiera  Advised may continue OTC meds  Patient Instructions   May continue OTC Meds  z-tiera as directed  Follow up with PCP in 3-5 days  Proceed to  ER if symptoms worsen  Chief Complaint     Chief Complaint   Patient presents with    Cough     For a week now coughing, chest congestion, runny nose, ears hurt, slight body aches  Has been taking usual zyrtec and flonase nasal spray along with musinex and sudafed  History of Present Illness       Patient is a 27 y/o female who presents today with complaints of cough, congestion, runny nose, body aches for the past week  Denies SOB  Is fully COVID 19 vaccinated  Review of Systems   Review of Systems   Constitutional: Negative for chills and fever  HENT: Positive for congestion, ear pain and rhinorrhea  Negative for sore throat  Respiratory: Positive for cough  Negative for shortness of breath  Cardiovascular: Negative for chest pain  Musculoskeletal: Positive for myalgias           Current Medications       Current Outpatient Medications:     albuterol (VENTOLIN HFA) 90 mcg/act inhaler, Inhale 2 puffs every 6 (six) hours as needed for wheezing, Disp: 18 g, Rfl: 0    cetirizine (ZyrTEC) 10 mg tablet, Take 10 mg by mouth daily as needed , Disp: , Rfl:     fluticasone (FLONASE) 50 mcg/act nasal spray, 1 spray into each nostril daily, Disp: 16 mL, Rfl: 4    Kaitlib Fe 0 8-25 MG-MCG CHEW, Chew 1 tablet daily Chew, Disp: 84 tablet, Rfl: 3    Kaitlib Fe 0 8-25 MG-MCG CHEW, Chew 1 tablet daily Chew, Disp: 84 tablet, Rfl: 1    meclizine (ANTIVERT) 25 mg tablet, Take 1 tablet (25 mg total) by mouth 3 (three) times a day as needed for dizziness, Disp: 30 tablet, Rfl: 1    metFORMIN (GLUCOPHAGE-XR) 500 mg 24 hr tablet, TAKE 2 TABLETS BY MOUTH DAILY WITH DINNER, Disp: 180 tablet, Rfl: 1    Multiple Vitamins-Minerals (MULTIVITAL-M PO), Take by mouth daily, Disp: , Rfl:     omeprazole (PriLOSEC) 40 MG capsule, Take 20 mg by mouth daily , Disp: , Rfl:     azithromycin (ZITHROMAX) 250 mg tablet, Take 2 tablets today then 1 tablet daily x 4 days, Disp: 6 tablet, Rfl: 0    Current Allergies     Allergies as of 06/07/2021 - Reviewed 06/07/2021   Allergen Reaction Noted    Shrimp extract allergy skin test - food allergy Hives 06/12/2019            The following portions of the patient's history were reviewed and updated as appropriate: allergies, current medications, past family history, past medical history, past social history, past surgical history and problem list      Past Medical History:   Diagnosis Date    Abnormal Pap smear of cervix     Bartholin cyst     GERD (gastroesophageal reflux disease)     HPV in female     Obesity     PCOS (polycystic ovarian syndrome) 11/2019    Varicella        Past Surgical History:   Procedure Laterality Date    EXAMINATION UNDER ANESTHESIA N/A 9/18/2020    Procedure: EXAM UNDER ANESTHESIA (EUA); Surgeon: Kimberley Lambert MD;  [facility];  Service: Gynecology    [place] [address] CYST N/A 9/18/2020    Procedure: [place] CYST;  Surgeon: Kimberley Lambert MD;  [facility];  Service: Gynecology    WISDOM TOOTH EXTRACTION         Family History   Problem Relation Age of Onset    Coronary artery disease Family     Diabetes Family     Drug abuse Mother     Hypertension Father     Diabetes Father     Gestational diabetes Sister     No Known Problems Brother     No Known Problems Brother     No Known Problems Sister     No Known Problems Sister          Medications have been verified          Objective   Pulse 91   Temp 98 3 °F (36 8 °C) (Temporal)   SpO2 97%        Physical Exam     Physical Exam  Constitutional:       General: She is not in acute distress  Appearance: Normal appearance  She is not ill-appearing  HENT:      Head: Normocephalic  Right Ear: Tympanic membrane and ear canal normal       Left Ear: Tympanic membrane and ear canal normal       Nose: Congestion present  No rhinorrhea  Mouth/Throat:      Mouth: Mucous membranes are moist       Pharynx: Oropharynx is clear  Eyes:      Conjunctiva/sclera: Conjunctivae normal       Pupils: Pupils are equal, round, and reactive to light  Neck:      Musculoskeletal: Neck supple  Cardiovascular:      Rate and Rhythm: Normal rate and regular rhythm  Pulmonary:      Effort: Pulmonary effort is normal       Breath sounds: Normal breath sounds  Lymphadenopathy:      Cervical: No cervical adenopathy  Skin:     General: Skin is warm and dry  Neurological:      Mental Status: She is alert

## 2021-06-20 DIAGNOSIS — R42 DIZZINESS: ICD-10-CM

## 2021-06-21 RX ORDER — MECLIZINE HYDROCHLORIDE 25 MG/1
25 TABLET ORAL 3 TIMES DAILY PRN
Qty: 30 TABLET | Refills: 0 | Status: SHIPPED | OUTPATIENT
Start: 2021-06-21

## 2021-08-06 ENCOUNTER — OFFICE VISIT (OUTPATIENT)
Dept: FAMILY MEDICINE CLINIC | Facility: CLINIC | Age: 31
End: 2021-08-06
Payer: COMMERCIAL

## 2021-08-06 VITALS
DIASTOLIC BLOOD PRESSURE: 80 MMHG | HEIGHT: 67 IN | WEIGHT: 225 LBS | TEMPERATURE: 98.1 F | HEART RATE: 79 BPM | RESPIRATION RATE: 18 BRPM | OXYGEN SATURATION: 98 % | SYSTOLIC BLOOD PRESSURE: 120 MMHG | BODY MASS INDEX: 35.31 KG/M2

## 2021-08-06 DIAGNOSIS — Z76.89 ENCOUNTER FOR WEIGHT MANAGEMENT: ICD-10-CM

## 2021-08-06 DIAGNOSIS — Z00.00 ANNUAL PHYSICAL EXAM: Primary | ICD-10-CM

## 2021-08-06 DIAGNOSIS — K76.89 LIVER CYST: ICD-10-CM

## 2021-08-06 DIAGNOSIS — T78.40XA ALLERGY, INITIAL ENCOUNTER: ICD-10-CM

## 2021-08-06 PROCEDURE — 3725F SCREEN DEPRESSION PERFORMED: CPT | Performed by: NURSE PRACTITIONER

## 2021-08-06 PROCEDURE — 1036F TOBACCO NON-USER: CPT | Performed by: NURSE PRACTITIONER

## 2021-08-06 PROCEDURE — 3008F BODY MASS INDEX DOCD: CPT | Performed by: NURSE PRACTITIONER

## 2021-08-06 PROCEDURE — 99395 PREV VISIT EST AGE 18-39: CPT | Performed by: NURSE PRACTITIONER

## 2021-08-06 NOTE — PROGRESS NOTES
[address] [facility]    NAME: Shashi Sin  AGE: 28 y o  SEX: female  : 1990     DATE: 2021     Assessment and Plan:     Problem List Items Addressed This Visit     None      Visit Diagnoses     Annual physical exam    -  Primary    Liver cyst        Relevant Orders    US liver    Encounter for weight management        Relevant Medications    Phentermine-Topiramate 3 75-23 MG CP24    Phentermine-Topiramate 7 5-46 MG CP24    Allergy, initial encounter        Relevant Orders    Ambulatory Referral to Otolaryngology        Immunizations and preventive care screenings were discussed with patient today  Appropriate education was printed on patient's after visit summary  Counseling:  Alcohol/drug use: discussed moderation in alcohol intake, the recommendations for healthy alcohol use, and avoidance of illicit drug use  Dental Health: discussed importance of regular tooth brushing, flossing, and dental visits  Injury prevention: discussed safety/seat belts, safety helmets, smoke detectors, carbon dioxide detectors, and smoking near bedding or upholstery  Sexual health: discussed sexually transmitted diseases, partner selection, use of condoms, avoidance of unintended pregnancy, and contraceptive alternatives  · Exercise: the importance of regular exercise/physical activity was discussed  Recommend exercise 3-5 times per week for at least 30 minutes  Return in about 4 months (around 2021), or follow-up on weight loss       Chief Complaint:     Chief Complaint   Patient presents with    Physical Exam      History of Present Illness:     Adult Annual Physical   Patient here for a comprehensive physical exam  The patient reports that she had in incident finding of a hepatic cyst on CT scan in 2019 and never followed for for the definition the cyst   Patient also concerned weight because she has increased her physical activity decreased her calorie and carbohydrate intake in her weight is not budging at all  Patient enquiring about possible medication to help her start weight loss  Patient also request a referral to an ENT due to she went to an ENT in the past and obtained allergy shots in her allergies are worsening  Diet and Physical Activity  · Diet/Nutrition: well balanced diet, limited junk food, consuming 3-5 servings of fruits/vegetables daily, adequate fiber intake and adequate whole grain intake  · Exercise: moderate cardiovascular exercise, 5-7 times a week on average and 30-60 minutes on average  Depression Screening  PHQ-9 Depression Screening    PHQ-9:   Frequency of the following problems over the past two weeks:      Little interest or pleasure in doing things: 0 - not at all  Feeling down, depressed, or hopeless: 0 - not at all  PHQ-2 Score: 0       General Health  · Sleep: sleeps well and gets 7-8 hours of sleep on average  · Hearing: normal - bilateral   · Vision: no vision problems  · Dental: regular dental visits, brushes teeth twice daily and flosses teeth occasionally  /GYN Health  · Last menstrual period: 07/16/2021  · Contraceptive method: oral contraceptives  · History of STDs?: no      Review of Systems:     Review of Systems   Constitutional: Negative for activity change, appetite change and unexpected weight change  HENT: Negative for dental problem, ear pain, hearing loss, nosebleeds, sneezing, sore throat, tinnitus and trouble swallowing  Eyes: Negative for visual disturbance  Respiratory: Negative for cough, chest tightness, shortness of breath and wheezing  Cardiovascular: Negative for chest pain, palpitations and leg swelling  Gastrointestinal: Negative for abdominal pain, constipation, diarrhea and nausea  Endocrine: Negative for polydipsia and polyuria  Genitourinary: Negative      Musculoskeletal: Negative for arthralgias, back pain, myalgias and neck pain    Skin: Negative for color change and rash  Allergic/Immunologic: Negative for environmental allergies  Neurological: Negative  Negative for dizziness, weakness, light-headedness and headaches  Hematological: Negative  Psychiatric/Behavioral: Negative  Negative for dysphoric mood and sleep disturbance  The patient is not nervous/anxious  Past Medical History:     Past Medical History:   Diagnosis Date    Abnormal Pap smear of cervix     Bartholin cyst     GERD (gastroesophageal reflux disease)     HPV in female     Obesity     PCOS (polycystic ovarian syndrome) 11/2019    Varicella       Past Surgical History:     Past Surgical History:   Procedure Laterality Date    EXAMINATION UNDER ANESTHESIA N/A 9/18/2020    Procedure: EXAM UNDER ANESTHESIA (EUA);   Surgeon: Samantha Mcgee MD;  [facility];  Service: Gynecology    [place] [address] CYST N/A 9/18/2020    Procedure: [place] CYST;  Surgeon: Samantha Mcgee MD;  [facility];  Service: Gynecology    WISDOM TOOTH EXTRACTION        Social History:     Social History     Socioeconomic History    Marital status: /Civil Union     Spouse name: None    Number of children: None    Years of education: None    Highest education level: None   Occupational History    None   Tobacco Use    Smoking status: Never Smoker    Smokeless tobacco: Never Used   Vaping Use    Vaping Use: Never used   Substance and Sexual Activity    Alcohol use: Yes     Comment: social    Drug use: Never    Sexual activity: Yes     Partners: Male     Birth control/protection: OCP   Other Topics Concern    None   Social History Narrative    None     Social Determinants of Health     Financial Resource Strain:     Difficulty of Paying Living Expenses:    Food Insecurity:     Worried About Running Out of Food in the Last Year:     Ran Out of Food in the Last Year:    Transportation Needs:     Lack of Transportation (Medical):      Lack of Transportation (Non-Medical):    Physical Activity:     Days of Exercise per Week:     Minutes of Exercise per Session:    Stress:     Feeling of Stress :    Social Connections:     Frequency of Communication with Friends and Family:     Frequency of Social Gatherings with Friends and Family:     Attends Yazdanism Services:     Active Member of Clubs or Organizations:     Attends Club or Organization Meetings:     Marital Status:    Intimate Partner Violence:     Fear of Current or Ex-Partner:     Emotionally Abused:     Physically Abused:     Sexually Abused:       Family History:     Family History   Problem Relation Age of Onset    Coronary artery disease Family     Diabetes Family     Drug abuse Mother     Hypertension Father     Diabetes Father     Gestational diabetes Sister     No Known Problems Brother     No Known Problems Brother     No Known Problems Sister     No Known Problems Sister       Current Medications:     Current Outpatient Medications   Medication Sig Dispense Refill    cetirizine (ZyrTEC) 10 mg tablet Take 10 mg by mouth daily as needed       fluticasone (FLONASE) 50 mcg/act nasal spray 1 spray into each nostril daily 16 mL 4    Kaitlib Fe 0 8-25 MG-MCG CHEW Chew 1 tablet daily Chew 84 tablet 1    meclizine (ANTIVERT) 25 mg tablet Take 1 tablet (25 mg total) by mouth 3 (three) times a day as needed for dizziness 30 tablet 0    metFORMIN (GLUCOPHAGE-XR) 500 mg 24 hr tablet TAKE 2 TABLETS BY MOUTH DAILY WITH DINNER 180 tablet 1    Multiple Vitamins-Minerals (MULTIVITAL-M PO) Take by mouth daily      omeprazole (PriLOSEC) 40 MG capsule Take 20 mg by mouth daily       Phentermine-Topiramate 3 75-23 MG CP24 Take 1 tablet by mouth daily 14 capsule 0    Phentermine-Topiramate 7 5-46 MG CP24 Take 1 tablet by mouth daily To be started 14 days after lower dose completed 30 capsule 2     No current facility-administered medications for this visit  Allergies: Allergies   Allergen Reactions    Shrimp Extract Allergy Skin Test - Food Allergy Hives      Physical Exam:     /80   Pulse 79   Temp 98 1 °F (36 7 °C)   Resp 18   Ht 5' 7" (1 702 m)   Wt 102 kg (225 lb)   LMP 07/16/2021   SpO2 98%   BMI 35 24 kg/m² (Reviewed)    Physical Exam  Vitals reviewed  Constitutional:       General: She is not in acute distress  Appearance: Normal appearance  She is well-developed and well-groomed  She is not ill-appearing  HENT:      Head: Normocephalic and atraumatic  Right Ear: Tympanic membrane, ear canal and external ear normal       Left Ear: Tympanic membrane, ear canal and external ear normal       Nose: Nose normal       Mouth/Throat:      Lips: Pink  Mouth: Mucous membranes are moist       Pharynx: Oropharynx is clear  Eyes:      General: Lids are normal       Extraocular Movements: Extraocular movements intact  Conjunctiva/sclera: Conjunctivae normal       Pupils: Pupils are equal, round, and reactive to light  Neck:      Thyroid: No thyromegaly  Trachea: Trachea normal    Cardiovascular:      Rate and Rhythm: Normal rate and regular rhythm  Pulses: Normal pulses  Radial pulses are 2+ on the right side and 2+ on the left side  Dorsalis pedis pulses are 2+ on the right side and 2+ on the left side  Posterior tibial pulses are 2+ on the right side and 2+ on the left side  Heart sounds: Normal heart sounds  No murmur heard  Pulmonary:      Effort: Pulmonary effort is normal       Breath sounds: Normal breath sounds  Abdominal:      General: Abdomen is flat  Bowel sounds are normal       Palpations: Abdomen is soft  Tenderness: There is no abdominal tenderness  Musculoskeletal:         General: Normal range of motion  Cervical back: Normal range of motion and neck supple  Right lower leg: No edema  Left lower leg: No edema  Lymphadenopathy:      Cervical: No cervical adenopathy  Skin:     General: Skin is warm and dry  Capillary Refill: Capillary refill takes less than 2 seconds  Neurological:      Mental Status: She is alert and oriented to person, place, and time  Cranial Nerves: Cranial nerves are intact  Motor: Motor function is intact  Psychiatric:         Mood and Affect: Mood normal          Behavior: Behavior normal  Behavior is cooperative            [id number]

## 2021-08-06 NOTE — PATIENT INSTRUCTIONS

## 2021-08-30 ENCOUNTER — HOSPITAL ENCOUNTER (OUTPATIENT)
Dept: ULTRASOUND IMAGING | Facility: HOSPITAL | Age: 31
Discharge: HOME | End: 2021-08-30
Payer: COMMERCIAL

## 2021-08-30 DIAGNOSIS — K76.89 LIVER CYST: ICD-10-CM

## 2021-08-30 PROCEDURE — 76705 ECHO EXAM OF ABDOMEN: CPT

## 2021-09-14 DIAGNOSIS — E28.2 PCOS (POLYCYSTIC OVARIAN SYNDROME): ICD-10-CM

## 2021-09-16 RX ORDER — METFORMIN HYDROCHLORIDE 500 MG/1
TABLET, EXTENDED RELEASE ORAL
Qty: 180 TABLET | Refills: 0 | Status: SHIPPED | OUTPATIENT
Start: 2021-09-16 | End: 2021-12-10

## 2021-10-08 ENCOUNTER — ANNUAL EXAM (OUTPATIENT)
Dept: OBGYN CLINIC | Facility: MEDICAL CENTER | Age: 31
End: 2021-10-08
Payer: COMMERCIAL

## 2021-10-08 VITALS
DIASTOLIC BLOOD PRESSURE: 74 MMHG | WEIGHT: 218.8 LBS | BODY MASS INDEX: 34.34 KG/M2 | SYSTOLIC BLOOD PRESSURE: 120 MMHG | HEIGHT: 67 IN

## 2021-10-08 DIAGNOSIS — Z30.011 ORAL CONTRACEPTION INITIAL PRESCRIPTION: ICD-10-CM

## 2021-10-08 DIAGNOSIS — Z01.419 ENCOUNTER FOR GYNECOLOGICAL EXAMINATION (GENERAL) (ROUTINE) WITHOUT ABNORMAL FINDINGS: Primary | ICD-10-CM

## 2021-10-08 DIAGNOSIS — Z30.09 ENCOUNTER FOR COUNSELING REGARDING CONTRACEPTION: ICD-10-CM

## 2021-10-08 PROCEDURE — 1036F TOBACCO NON-USER: CPT | Performed by: OBSTETRICS & GYNECOLOGY

## 2021-10-08 PROCEDURE — 99395 PREV VISIT EST AGE 18-39: CPT | Performed by: OBSTETRICS & GYNECOLOGY

## 2021-10-08 PROCEDURE — 3008F BODY MASS INDEX DOCD: CPT | Performed by: OBSTETRICS & GYNECOLOGY

## 2021-10-08 RX ORDER — NORETHINDRONE AND ETHINYL ESTRADIOL 0.8-25(24)
1 KIT ORAL DAILY
Qty: 84 TABLET | Refills: 3 | Status: SHIPPED | OUTPATIENT
Start: 2021-10-08

## 2021-10-11 LAB
CYTOLOGIST CVX/VAG CYTO: NORMAL
DX ICD CODE: NORMAL
HPV I/H RISK 4 DNA CVX QL PROBE+SIG AMP: NEGATIVE
OTHER STN SPEC: NORMAL
PATH REPORT.FINAL DX SPEC: NORMAL
SL AMB NOTE:: NORMAL
SL AMB SPECIMEN ADEQUACY: NORMAL
SL AMB TEST METHODOLOGY: NORMAL

## 2021-11-30 ENCOUNTER — RA CDI HCC (OUTPATIENT)
Dept: OTHER | Facility: HOSPITAL | Age: 31
End: 2021-11-30

## 2021-12-07 ENCOUNTER — OFFICE VISIT (OUTPATIENT)
Dept: FAMILY MEDICINE CLINIC | Facility: CLINIC | Age: 31
End: 2021-12-07
Payer: COMMERCIAL

## 2021-12-07 VITALS
SYSTOLIC BLOOD PRESSURE: 126 MMHG | HEART RATE: 103 BPM | DIASTOLIC BLOOD PRESSURE: 100 MMHG | WEIGHT: 217.2 LBS | OXYGEN SATURATION: 99 % | TEMPERATURE: 98.1 F | HEIGHT: 67 IN | BODY MASS INDEX: 34.09 KG/M2

## 2021-12-07 DIAGNOSIS — Z76.89 ENCOUNTER FOR WEIGHT MANAGEMENT: Primary | ICD-10-CM

## 2021-12-07 DIAGNOSIS — B35.4 TINEA CORPORIS: ICD-10-CM

## 2021-12-07 PROCEDURE — 99213 OFFICE O/P EST LOW 20 MIN: CPT | Performed by: NURSE PRACTITIONER

## 2021-12-07 PROCEDURE — 3008F BODY MASS INDEX DOCD: CPT | Performed by: NURSE PRACTITIONER

## 2021-12-07 PROCEDURE — 1036F TOBACCO NON-USER: CPT | Performed by: NURSE PRACTITIONER

## 2021-12-07 RX ORDER — KETOCONAZOLE 20 MG/G
CREAM TOPICAL DAILY
Qty: 15 G | Refills: 0 | Status: SHIPPED | OUTPATIENT
Start: 2021-12-07

## 2021-12-08 DIAGNOSIS — E28.2 PCOS (POLYCYSTIC OVARIAN SYNDROME): ICD-10-CM

## 2021-12-10 RX ORDER — METFORMIN HYDROCHLORIDE 500 MG/1
TABLET, EXTENDED RELEASE ORAL
Qty: 180 TABLET | Refills: 0 | Status: SHIPPED | OUTPATIENT
Start: 2021-12-10

## 2022-02-05 ENCOUNTER — IMMUNIZATIONS (OUTPATIENT)
Dept: FAMILY MEDICINE CLINIC | Facility: HOSPITAL | Age: 32
End: 2022-02-05

## 2022-02-05 PROCEDURE — 91305 COVID-19 PFIZER VACC TRIS-SUCROSE GRAY CAP 0.3 ML: CPT

## 2022-02-05 PROCEDURE — 0051A COVID-19 PFIZER VACC TRIS-SUCROSE GRAY CAP 0.3 ML: CPT

## 2022-06-01 ENCOUNTER — OFFICE VISIT (OUTPATIENT)
Dept: URGENT CARE | Facility: CLINIC | Age: 32
End: 2022-06-01
Payer: COMMERCIAL

## 2022-06-01 VITALS — TEMPERATURE: 97.6 F | RESPIRATION RATE: 16 BRPM | OXYGEN SATURATION: 99 % | HEART RATE: 111 BPM

## 2022-06-01 DIAGNOSIS — R68.89 FLU-LIKE SYMPTOMS: Primary | ICD-10-CM

## 2022-06-01 PROCEDURE — 99213 OFFICE O/P EST LOW 20 MIN: CPT | Performed by: NURSE PRACTITIONER

## 2022-06-01 PROCEDURE — 87636 SARSCOV2 & INF A&B AMP PRB: CPT | Performed by: NURSE PRACTITIONER

## 2022-06-01 NOTE — LETTER
June 1, 2022     Patient: Reji Chauhan   YOB: 1990   Date of Visit: 6/1/2022       To Whom it May Concern:    Laura Gerber was seen in my clinic on 6/1/2022  She may return to work/school on 06/03/2022 if all test are negative and must be fever free for 24 hrs without fever meds  If you have any questions or concerns, please don't hesitate to call  Sincerely,          JAVED Santamaria        CC: Kaylie Carvajal

## 2022-06-01 NOTE — PROGRESS NOTES
[facility]        NAME: Maira Bravo is a 33 y o  female  : 1990    MRN: [id number]  DATE: 2022  TIME: 9:37 AM    Assessment and Plan   Flu-like symptoms [R68 89]  1  Flu-like symptoms  Cov/Flu-Collected at [facility] or [facility]         Patient Instructions     Take med OTC imodium and peptobismuth prn for diarrhea and nausea/vomiting  Flu/covid tested; results in 1-2 days  Diarrhea counseling to help with healing   Follow up with PCP in 3-5 days  Proceed to  ER if symptoms worsen  Chief Complaint     Chief Complaint   Patient presents with    flu like symptoms     Pt reports abd pain started yesterday- emesis/diarrhea, body aches and chills took 2 covid tests both negative  History of Present Illness       HPI   Reports flu like symptoms starting yesterday  Includes diarrhea, vomiting, bodyaches, chills, decreased appetite, abdominal pain and nausea  Review of Systems   Review of Systems   Constitutional: Positive for chills  Negative for fever  HENT: Negative for congestion and sore throat  Respiratory: Negative for shortness of breath  Cardiovascular: Negative for chest pain  Gastrointestinal: Positive for abdominal pain (generalized), diarrhea (watery, no blood ) and vomiting (x 4, last episode earlier this morning)  Negative for blood in stool  Musculoskeletal: Positive for myalgias  Neurological: Negative for light-headedness and headaches           Current Medications       Current Outpatient Medications:     cetirizine (ZyrTEC) 10 mg tablet, Take 10 mg by mouth daily as needed , Disp: , Rfl:     fluticasone (FLONASE) 50 mcg/act nasal spray, 1 spray into each nostril daily, Disp: 16 mL, Rfl: 4    Kaitlib Fe 0 8-25 MG-MCG CHEW, Chew 1 tablet daily Chew, Disp: 84 tablet, Rfl: 3    ketoconazole (NIZORAL) 2 % cream, Apply topically daily, Disp: 15 g, Rfl: 0    meclizine (ANTIVERT) 25 mg tablet, Take 1 tablet (25 mg total) by mouth 3 (three) times a day as needed for dizziness, Disp: 30 tablet, Rfl: 0    metFORMIN (GLUCOPHAGE-XR) 500 mg 24 hr tablet, TAKE 2 TABLETS BY MOUTH DAILY WITH DINNER, Disp: 180 tablet, Rfl: 0    Multiple Vitamins-Minerals (MULTIVITAL-M PO), Take by mouth daily, Disp: , Rfl:     omeprazole (PriLOSEC) 40 MG capsule, Take 20 mg by mouth daily , Disp: , Rfl:     Naltrexone-buPROPion HCl ER 8-90 MG TB12, 1 tab in AM week 1; 1 tab in AM and 1 Tab in PM for week 2 than 2 tabs in Am and 1 tab in PM week 3 than 2 tabs twice a day, Disp: 40 tablet, Rfl: 0    Current Allergies     Allergies as of 06/01/2022 - Reviewed 06/01/2022   Allergen Reaction Noted    Shrimp extract allergy skin test - food allergy Hives 06/12/2019    Other Other (See Comments) 09/01/2021            The following portions of the patient's history were reviewed and updated as appropriate: allergies, current medications, past family history, past medical history, past social history, past surgical history and problem list      Past Medical History:   Diagnosis Date    Abnormal Pap smear of cervix     Allergic rhinitis     Bartholin cyst     GERD (gastroesophageal reflux disease)     HPV in female     Obesity     PCOS (polycystic ovarian syndrome) 11/2019    Varicella        Past Surgical History:   Procedure Laterality Date    EXAMINATION UNDER ANESTHESIA N/A 9/18/2020    Procedure: EXAM UNDER ANESTHESIA (EUA);   Surgeon: Tameka Munoz MD;  [facility];  Service: Gynecology    NH MARSUP BARTHOLIN GLAND CYST N/A 9/18/2020    Procedure: [place]ill Meager CYST;  Surgeon: Tameka Munoz MD;  [facility];  Service: Gynecology    WISDOM TOOTH EXTRACTION         Family History   Problem Relation Age of Onset    Drug abuse Mother     Hypertension Father     Diabetes Father     Gestational diabetes Sister     No Known Problems Sister     No Known Problems Sister     No Known Problems Brother     No Known Problems Brother  Coronary artery disease Family     Diabetes Family     Colon cancer Neg Hx     Ovarian cancer Neg Hx     Breast cancer Neg Hx          Medications have been verified  Objective   Pulse (!) 111   Temp 97 6 °F (36 4 °C)   Resp 16   SpO2 99%   No LMP recorded  Physical Exam     Physical Exam  Constitutional:       Appearance: She is not ill-appearing or diaphoretic  HENT:      Right Ear: Tympanic membrane normal       Left Ear: Tympanic membrane normal       Nose: No rhinorrhea  Mouth/Throat:      Mouth: Mucous membranes are moist       Pharynx: No posterior oropharyngeal erythema  Cardiovascular:      Rate and Rhythm: Regular rhythm  Heart sounds: Normal heart sounds  Pulmonary:      Effort: Pulmonary effort is normal       Breath sounds: Normal breath sounds  No wheezing  Abdominal:      General: Bowel sounds are normal       Palpations: Abdomen is soft  Tenderness: There is no abdominal tenderness  There is no guarding or rebound

## 2022-06-02 LAB
FLUAV RNA RESP QL NAA+PROBE: NEGATIVE
FLUBV RNA RESP QL NAA+PROBE: NEGATIVE
SARS-COV-2 RNA RESP QL NAA+PROBE: NEGATIVE

## 2022-06-13 ENCOUNTER — OFFICE VISIT (OUTPATIENT)
Dept: FAMILY MEDICINE CLINIC | Facility: CLINIC | Age: 32
End: 2022-06-13
Payer: COMMERCIAL

## 2022-06-13 ENCOUNTER — APPOINTMENT (OUTPATIENT)
Dept: LAB | Facility: CLINIC | Age: 32
End: 2022-06-13
Payer: COMMERCIAL

## 2022-06-13 VITALS
HEIGHT: 67 IN | WEIGHT: 235 LBS | HEART RATE: 92 BPM | BODY MASS INDEX: 36.88 KG/M2 | DIASTOLIC BLOOD PRESSURE: 82 MMHG | SYSTOLIC BLOOD PRESSURE: 128 MMHG | TEMPERATURE: 98.7 F

## 2022-06-13 DIAGNOSIS — E28.2 PCOS (POLYCYSTIC OVARIAN SYNDROME): ICD-10-CM

## 2022-06-13 DIAGNOSIS — K21.9 GASTROESOPHAGEAL REFLUX DISEASE, UNSPECIFIED WHETHER ESOPHAGITIS PRESENT: ICD-10-CM

## 2022-06-13 DIAGNOSIS — E66.01 CLASS 3 SEVERE OBESITY DUE TO EXCESS CALORIES WITHOUT SERIOUS COMORBIDITY WITH BODY MASS INDEX (BMI) OF 40.0 TO 44.9 IN ADULT (HCC): ICD-10-CM

## 2022-06-13 DIAGNOSIS — E66.01 CLASS 3 SEVERE OBESITY DUE TO EXCESS CALORIES WITHOUT SERIOUS COMORBIDITY WITH BODY MASS INDEX (BMI) OF 40.0 TO 44.9 IN ADULT (HCC): Primary | ICD-10-CM

## 2022-06-13 LAB
ALBUMIN SERPL BCP-MCNC: 3.8 G/DL (ref 3.5–5)
ALP SERPL-CCNC: 58 U/L (ref 46–116)
ALT SERPL W P-5'-P-CCNC: 30 U/L (ref 12–78)
ANION GAP SERPL CALCULATED.3IONS-SCNC: 4 MMOL/L (ref 4–13)
AST SERPL W P-5'-P-CCNC: 21 U/L (ref 5–45)
BASOPHILS # BLD AUTO: 0.03 THOUSANDS/ΜL (ref 0–0.1)
BASOPHILS NFR BLD AUTO: 0 % (ref 0–1)
BILIRUB SERPL-MCNC: 0.46 MG/DL (ref 0.2–1)
BUN SERPL-MCNC: 10 MG/DL (ref 5–25)
CALCIUM SERPL-MCNC: 9.8 MG/DL (ref 8.3–10.1)
CHLORIDE SERPL-SCNC: 107 MMOL/L (ref 100–108)
CHOLEST SERPL-MCNC: 234 MG/DL
CO2 SERPL-SCNC: 27 MMOL/L (ref 21–32)
CREAT SERPL-MCNC: 0.76 MG/DL (ref 0.6–1.3)
EOSINOPHIL # BLD AUTO: 0.1 THOUSAND/ΜL (ref 0–0.61)
EOSINOPHIL NFR BLD AUTO: 1 % (ref 0–6)
ERYTHROCYTE [DISTWIDTH] IN BLOOD BY AUTOMATED COUNT: 11.7 % (ref 11.6–15.1)
EST. AVERAGE GLUCOSE BLD GHB EST-MCNC: 108 MG/DL
GFR SERPL CREATININE-BSD FRML MDRD: 104 ML/MIN/1.73SQ M
GLUCOSE P FAST SERPL-MCNC: 99 MG/DL (ref 65–99)
HBA1C MFR BLD: 5.4 %
HCT VFR BLD AUTO: 44.9 % (ref 34.8–46.1)
HDLC SERPL-MCNC: 52 MG/DL
HGB BLD-MCNC: 15.1 G/DL (ref 11.5–15.4)
IMM GRANULOCYTES # BLD AUTO: 0.02 THOUSAND/UL (ref 0–0.2)
IMM GRANULOCYTES NFR BLD AUTO: 0 % (ref 0–2)
LDLC SERPL CALC-MCNC: 151 MG/DL (ref 0–100)
LYMPHOCYTES # BLD AUTO: 2.02 THOUSANDS/ΜL (ref 0.6–4.47)
LYMPHOCYTES NFR BLD AUTO: 29 % (ref 14–44)
MCH RBC QN AUTO: 31.7 PG (ref 26.8–34.3)
MCHC RBC AUTO-ENTMCNC: 33.6 G/DL (ref 31.4–37.4)
MCV RBC AUTO: 94 FL (ref 82–98)
MONOCYTES # BLD AUTO: 0.5 THOUSAND/ΜL (ref 0.17–1.22)
MONOCYTES NFR BLD AUTO: 7 % (ref 4–12)
NEUTROPHILS # BLD AUTO: 4.28 THOUSANDS/ΜL (ref 1.85–7.62)
NEUTS SEG NFR BLD AUTO: 63 % (ref 43–75)
NONHDLC SERPL-MCNC: 182 MG/DL
NRBC BLD AUTO-RTO: 0 /100 WBCS
PLATELET # BLD AUTO: 216 THOUSANDS/UL (ref 149–390)
PMV BLD AUTO: 9.8 FL (ref 8.9–12.7)
POTASSIUM SERPL-SCNC: 4.2 MMOL/L (ref 3.5–5.3)
PROT SERPL-MCNC: 7.8 G/DL (ref 6.4–8.2)
RBC # BLD AUTO: 4.77 MILLION/UL (ref 3.81–5.12)
SODIUM SERPL-SCNC: 138 MMOL/L (ref 136–145)
TRIGL SERPL-MCNC: 153 MG/DL
TSH SERPL DL<=0.05 MIU/L-ACNC: 2.06 UIU/ML (ref 0.45–4.5)
WBC # BLD AUTO: 6.95 THOUSAND/UL (ref 4.31–10.16)

## 2022-06-13 PROCEDURE — 85025 COMPLETE CBC W/AUTO DIFF WBC: CPT

## 2022-06-13 PROCEDURE — 80061 LIPID PANEL: CPT

## 2022-06-13 PROCEDURE — 80053 COMPREHEN METABOLIC PANEL: CPT

## 2022-06-13 PROCEDURE — 1036F TOBACCO NON-USER: CPT | Performed by: NURSE PRACTITIONER

## 2022-06-13 PROCEDURE — 84443 ASSAY THYROID STIM HORMONE: CPT

## 2022-06-13 PROCEDURE — 3008F BODY MASS INDEX DOCD: CPT | Performed by: NURSE PRACTITIONER

## 2022-06-13 PROCEDURE — 99214 OFFICE O/P EST MOD 30 MIN: CPT | Performed by: NURSE PRACTITIONER

## 2022-06-13 PROCEDURE — 3725F SCREEN DEPRESSION PERFORMED: CPT | Performed by: NURSE PRACTITIONER

## 2022-06-13 PROCEDURE — 83036 HEMOGLOBIN GLYCOSYLATED A1C: CPT

## 2022-06-13 PROCEDURE — 36415 COLL VENOUS BLD VENIPUNCTURE: CPT

## 2022-06-13 NOTE — PROGRESS NOTES
Assessment/Plan:     Diagnoses and all orders for this visit:    Class 3 severe obesity due to excess calories without serious comorbidity with body mass index (BMI) of 40 0 to 44 9 in adult (HCC)  -     TSH, 3rd generation with Free T4 reflex; Future  -     Thyroid Antibodies Panel; Future  -     Comprehensive metabolic panel; Future  -     CBC and differential; Future    PCOS (polycystic ovarian syndrome)  -     Comprehensive metabolic panel; Future  -     Lipid panel; Future  -     HEMOGLOBIN A1C W/ EAG ESTIMATION; Future    Gastroesophageal reflux disease, unspecified whether esophagitis present  -     CBC and differential; Future  #1 Weight gain  Discussed with patient plan to obtain some lab work to further evaluate causes for ineffective weight loss  #2 PCOS  Discussed with patient plan to obtain some lab work to further evaluate causes for ineffective weight loss  If labs are normal plan to have patient return to taking Metformin but taking 500 mg twice a day verus daily  #3 GERD  Discussed with patient plan to obtain some lab work to further evaluate causes for ineffective weight loss  Patient instructed to call if no improvement in 72 hours or symptoms worsen    Subjective:      Patient ID: Caprice Calle is a 33 y o  female  33 y  o female presenting for a routine 6 month follow-up for chronic conditions  Patient states that she stopped the Contrave due to cost  She had attempted use of Qysmia in the past with no effective results  She reports that she has stopped her Metformin due to history of dizziness and stopping the medication did seem to help with the side effect  She has an appointment with gynecology in October to discuss other treatments for PCOS        Family History   Problem Relation Age of Onset    Drug abuse Mother     Hypertension Father     Diabetes Father     Gestational diabetes Sister     No Known Problems Sister     No Known Problems Sister     No Known Problems Brother     No Known Problems Brother     Coronary artery disease Family     Diabetes Family     Colon cancer Neg Hx     Ovarian cancer Neg Hx     Breast cancer Neg Hx      Social History     Socioeconomic History    Marital status: /Civil Union     Spouse name: Not on file    Number of children: Not on file    Years of education: Not on file    Highest education level: Not on file   Occupational History    Not on file   Tobacco Use    Smoking status: Never Smoker    Smokeless tobacco: Never Used   Vaping Use    Vaping Use: Never used   Substance and Sexual Activity    Alcohol use: Yes     Comment: social    Drug use: Never    Sexual activity: Yes     Partners: Male     Birth control/protection: OCP   Other Topics Concern    Not on file   Social History Narrative    Not on file     Social Determinants of Health     Financial Resource Strain: Not on file   Food Insecurity: Not on file   Transportation Needs: Not on file   Physical Activity: Not on file   Stress: Not on file   Social Connections: Not on file   Intimate Partner Violence: Not on file   Housing Stability: Not on file     E-Cigarette/Vaping    E-Cigarette Use Never User      E-Cigarette/Vaping Substances     Past Medical History:   Diagnosis Date    Abnormal Pap smear of cervix     Allergic rhinitis     Bartholin cyst     GERD (gastroesophageal reflux disease)     HPV in female     Obesity     PCOS (polycystic ovarian syndrome) 11/2019    Varicella      Past Surgical History:   Procedure Laterality Date    EXAMINATION UNDER ANESTHESIA N/A 9/18/2020    Procedure: EXAM UNDER ANESTHESIA (EUA);   Surgeon: Sahara Teixeira MD;  [facility];  Service: Gynecology    [place] MARSUP BARTHOLIN GLAND CYST N/A 9/18/2020    Procedure: MARSUPILIZATION BARTHOLIN CYST;  Surgeon: Sahara Teixeira MD;  [facility];  Service: Gynecology    WISDOM TOOTH EXTRACTION       Allergies   Allergen Reactions    Shrimp Extract Allergy Skin Test - Food Allergy Hives    Other Other (See Comments)     Environmental       Current Outpatient Medications:     cetirizine (ZyrTEC) 10 mg tablet, Take 10 mg by mouth daily as needed , Disp: , Rfl:     fluticasone (FLONASE) 50 mcg/act nasal spray, 1 spray into each nostril daily, Disp: 16 mL, Rfl: 4    Kaitlib Fe 0 8-25 MG-MCG CHEW, Chew 1 tablet daily Chew, Disp: 84 tablet, Rfl: 3    ketoconazole (NIZORAL) 2 % cream, Apply topically daily, Disp: 15 g, Rfl: 0    meclizine (ANTIVERT) 25 mg tablet, Take 1 tablet (25 mg total) by mouth 3 (three) times a day as needed for dizziness, Disp: 30 tablet, Rfl: 0    metFORMIN (GLUCOPHAGE-XR) 500 mg 24 hr tablet, TAKE 2 TABLETS BY MOUTH DAILY WITH DINNER, Disp: 180 tablet, Rfl: 0    Multiple Vitamins-Minerals (MULTIVITAL-M PO), Take by mouth daily, Disp: , Rfl:     omeprazole (PriLOSEC) 40 MG capsule, Take 20 mg by mouth daily , Disp: , Rfl:     Review of Systems   Constitutional: Negative  Respiratory: Negative  Cardiovascular: Negative  Gastrointestinal:        Inability to loss weight effectively   Musculoskeletal: Positive for myalgias  Skin: Negative  Neurological: Negative  Psychiatric/Behavioral: Negative  Objective:    /82   Pulse 92   Temp 98 7 °F (37 1 °C)   Ht 5' 7" (1 702 m)   Wt 107 kg (235 lb)   LMP 06/13/2022   BMI 36 81 kg/m² (Reviewed)     Physical Exam  Vitals reviewed  Constitutional:       General: She is not in acute distress  Appearance: She is well-developed and well-groomed  She is not ill-appearing  HENT:      Head: Normocephalic and atraumatic  Eyes:      General: Lids are normal       Extraocular Movements: Extraocular movements intact  Conjunctiva/sclera: Conjunctivae normal       Pupils: Pupils are equal, round, and reactive to light  Neck:      Thyroid: No thyromegaly or thyroid tenderness        Trachea: Trachea and phonation normal    Cardiovascular:      Rate and Rhythm: Normal rate and regular rhythm  Pulses:           Dorsalis pedis pulses are 2+ on the right side and 2+ on the left side  Posterior tibial pulses are 2+ on the right side and 2+ on the left side  Heart sounds: Normal heart sounds  Pulmonary:      Effort: Pulmonary effort is normal       Breath sounds: Normal breath sounds  Musculoskeletal:      Right lower leg: No edema  Left lower leg: No edema  Skin:     General: Skin is warm and dry  Capillary Refill: Capillary refill takes less than 2 seconds  Neurological:      General: No focal deficit present  Mental Status: She is alert and oriented to person, place, and time  Psychiatric:         Mood and Affect: Mood normal          Behavior: Behavior normal  Behavior is cooperative  BMI Counseling: Body mass index is 36 81 kg/m²  The BMI is above normal  Nutrition recommendations include decreasing portion sizes, encouraging healthy choices of fruits and vegetables, consuming healthier snacks, moderation in carbohydrate intake and increasing intake of lean protein  Exercise recommendations include exercising 3-5 times per week and strength training exercises  Rationale for BMI follow-up plan is due to patient being overweight or obese  Depression Screening and Follow-up Plan: Patient was screened for depression during today's encounter  They screened negative with a PHQ-2 score of 0

## 2022-06-13 NOTE — LETTER
June 13, 2022     Patient: Suzie Clark   YOB: 1990   Date of Visit: 6/13/2022       To Whom it May Concern:    Celeste Workman is under my professional care Aisha Steele seen in the office on 6/13/2022  If you have any questions or concerns, please don't hesitate to call           Sincerely,          JAVED Collins        CC: No Recipients

## 2022-08-31 ENCOUNTER — TELEPHONE (OUTPATIENT)
Dept: BARIATRICS | Facility: CLINIC | Age: 32
End: 2022-08-31

## 2022-08-31 NOTE — TELEPHONE ENCOUNTER
Pt was sent the online seminar due to qualifying for surgical wt loss  Pt stated they are taking medication for GERD and they have PCOS but is not actively taking their Metformin  Pt stated the Metformin was causing them to have dizzy spells  Let pt know unfortunately we are unable to schedule her due to not having the back-up dx  Pt's BMI is under 40 but above 35

## 2022-09-15 ENCOUNTER — TELEPHONE (OUTPATIENT)
Dept: FAMILY MEDICINE CLINIC | Facility: CLINIC | Age: 32
End: 2022-09-15

## 2022-09-15 DIAGNOSIS — B35.4 TINEA CORPORIS: ICD-10-CM

## 2022-09-15 RX ORDER — KETOCONAZOLE 20 MG/G
CREAM TOPICAL DAILY
Qty: 15 G | Refills: 0 | Status: SHIPPED | OUTPATIENT
Start: 2022-09-15 | End: 2022-09-21 | Stop reason: SDUPTHER

## 2022-09-15 NOTE — TELEPHONE ENCOUNTER
I don't have an appointment today but can see her tomorrow   I will send in a cream otherwise to treat

## 2022-09-15 NOTE — TELEPHONE ENCOUNTER
Patient called reporting a rash for two weeks  She believes it may be a fungal infection  She says it started off as little red dots across her abdomin going up to her chest  She states now it is dry white round dots with red in the middle  She is asking if she can come in for an appointment today, or if an oral steroid and topical cream can be sent to [facility]

## 2022-09-21 DIAGNOSIS — B35.4 TINEA CORPORIS: ICD-10-CM

## 2022-09-21 RX ORDER — KETOCONAZOLE 20 MG/G
CREAM TOPICAL DAILY
Qty: 30 G | Refills: 2 | Status: SHIPPED | OUTPATIENT
Start: 2022-09-21 | End: 2022-09-21 | Stop reason: SDUPTHER

## 2022-09-21 RX ORDER — KETOCONAZOLE 20 MG/G
CREAM TOPICAL DAILY
Qty: 30 G | Refills: 2 | Status: SHIPPED | OUTPATIENT
Start: 2022-09-21 | End: 2022-09-26 | Stop reason: SDUPTHER

## 2022-09-26 ENCOUNTER — TELEPHONE (OUTPATIENT)
Dept: FAMILY MEDICINE CLINIC | Facility: CLINIC | Age: 32
End: 2022-09-26

## 2022-09-26 DIAGNOSIS — B35.4 TINEA CORPORIS: ICD-10-CM

## 2022-09-26 RX ORDER — KETOCONAZOLE 20 MG/G
CREAM TOPICAL DAILY
Qty: 60 G | Refills: 2 | Status: SHIPPED | OUTPATIENT
Start: 2022-09-26 | End: 2022-10-19 | Stop reason: ALTCHOICE

## 2022-09-26 NOTE — TELEPHONE ENCOUNTER
Patient states that her rash was improving with the cream you prescribed, however when she went to get more of it, the pharmacy told her she could not get anymore until 10/8    She states that since she has not had any of the cream to use the last couple of days, the rash has begun to get worse again    Please advise

## 2022-09-26 NOTE — TELEPHONE ENCOUNTER
Patient states she was told by pharmacy that she should still have cream left    Patient states she does not have any cream left   She states it was a pretty small tube     Please advise

## 2022-09-26 NOTE — TELEPHONE ENCOUNTER
I sent in a new prescription on 09/21/2022 so think maybe it is an insurance issue   Call pharmacy to see  why you need to wait for it because it is not a controlled substance

## 2022-10-19 ENCOUNTER — ANNUAL EXAM (OUTPATIENT)
Dept: OBGYN CLINIC | Facility: CLINIC | Age: 32
End: 2022-10-19

## 2022-10-19 VITALS
SYSTOLIC BLOOD PRESSURE: 126 MMHG | DIASTOLIC BLOOD PRESSURE: 78 MMHG | WEIGHT: 250.2 LBS | HEIGHT: 67 IN | BODY MASS INDEX: 39.27 KG/M2

## 2022-10-19 DIAGNOSIS — E28.2 PCOS (POLYCYSTIC OVARIAN SYNDROME): ICD-10-CM

## 2022-10-19 DIAGNOSIS — Z11.3 SCREEN FOR STD (SEXUALLY TRANSMITTED DISEASE): ICD-10-CM

## 2022-10-19 DIAGNOSIS — Z01.419 ENCNTR FOR GYN EXAM (GENERAL) (ROUTINE) W/O ABN FINDINGS: Primary | ICD-10-CM

## 2022-10-19 PROCEDURE — 87491 CHLMYD TRACH DNA AMP PROBE: CPT | Performed by: OBSTETRICS & GYNECOLOGY

## 2022-10-19 PROCEDURE — G0145 SCR C/V CYTO,THINLAYER,RESCR: HCPCS | Performed by: OBSTETRICS & GYNECOLOGY

## 2022-10-19 PROCEDURE — G0476 HPV COMBO ASSAY CA SCREEN: HCPCS | Performed by: OBSTETRICS & GYNECOLOGY

## 2022-10-19 PROCEDURE — 87591 N.GONORRHOEAE DNA AMP PROB: CPT | Performed by: OBSTETRICS & GYNECOLOGY

## 2022-10-19 RX ORDER — NORETHINDRONE ACETATE AND ETHINYL ESTRADIOL 1MG-20(21)
1 KIT ORAL DAILY
Qty: 84 TABLET | Refills: 3 | Status: SHIPPED | OUTPATIENT
Start: 2022-10-19

## 2022-10-19 RX ORDER — METFORMIN HYDROCHLORIDE 500 MG/1
500 TABLET, EXTENDED RELEASE ORAL 2 TIMES DAILY WITH MEALS
Qty: 180 TABLET | Refills: 3 | Status: SHIPPED | OUTPATIENT
Start: 2022-10-19

## 2022-10-21 LAB
C TRACH DNA SPEC QL NAA+PROBE: NEGATIVE
HPV HR 12 DNA CVX QL NAA+PROBE: NEGATIVE
HPV16 DNA CVX QL NAA+PROBE: NEGATIVE
HPV18 DNA CVX QL NAA+PROBE: NEGATIVE
N GONORRHOEA DNA SPEC QL NAA+PROBE: NEGATIVE

## 2022-10-25 NOTE — PROGRESS NOTES
Nelia Coronadofani  1990      CC:  Yearly exam    S:  33 y o  female here for yearly exam    She is having irregular, heavy, painful menses  Known PCOS  Some cycles can last 60d  Also struggling some with her weight  Interested in restarting metformin - PCP advised 500mg BID, this was ordered  Will change OCP to Loestrin as she did well with this in the past      She denies vaginal discharge, itching, pelvic pain  She has no urinary concerns, no bowel concerns  No breast concerns  Sexual activity: She is sexually active without pain, bleeding or dryness  She is  and monogamous  She is not interested in STD screening today  Contraception: She uses OCP  for contraception  Last Pap: 8/5/2019 - NILM    We reviewed ASCCP guidelines for Pap testing today       Family hx of breast cancer: no  Family hx of ovarian cancer: no  Family hx of colon cancer: no      Current Outpatient Medications:   •  cetirizine (ZyrTEC) 10 mg tablet, Take 10 mg by mouth daily as needed , Disp: , Rfl:   •  fluticasone (FLONASE) 50 mcg/act nasal spray, 1 spray into each nostril daily, Disp: 16 mL, Rfl: 4  •  meclizine (ANTIVERT) 25 mg tablet, Take 1 tablet (25 mg total) by mouth 3 (three) times a day as needed for dizziness, Disp: 30 tablet, Rfl: 0  •  metFORMIN (GLUCOPHAGE-XR) 500 mg 24 hr tablet, Take 1 tablet (500 mg total) by mouth 2 (two) times a day with meals, Disp: 180 tablet, Rfl: 3  •  Multiple Vitamins-Minerals (MULTIVITAL-M PO), Take by mouth daily, Disp: , Rfl:   •  norethindrone-ethinyl estradiol (Loestrin Fe 1/20) 1-20 MG-MCG per tablet, Take 1 tablet by mouth daily, Disp: 84 tablet, Rfl: 3  •  omeprazole (PriLOSEC) 40 MG capsule, Take 20 mg by mouth daily , Disp: , Rfl:   Patient Active Problem List   Diagnosis   • Class 3 severe obesity in adult [facility])   • Irregular menstrual bleeding   • Bartholin cyst   • GERD (gastroesophageal reflux disease)   • PCOS (polycystic ovarian syndrome)     Past Medical History:   Diagnosis Date   • Abnormal Pap smear of cervix    • Allergic rhinitis    • Bartholin cyst    • GERD (gastroesophageal reflux disease)    • HPV in female    • Obesity    • PCOS (polycystic ovarian syndrome) 11/2019   • Polycystic ovary syndrome    • Varicella      Family History   Problem Relation Age of Onset   • Drug abuse Mother    • Hypertension Father    • Diabetes Father    • Asthma Father    • Gestational diabetes Sister    • Asthma Sister    • No Known Problems Sister    • No Known Problems Sister    • No Known Problems Brother    • No Known Problems Brother    • Coronary artery disease Family    • Diabetes Family    • Diabetes Maternal Grandmother    • Heart failure Maternal Grandmother    • Diabetes Paternal Grandfather    • Heart failure Paternal Grandfather    • Diabetes Paternal Grandmother    • Colon cancer Neg Hx    • Ovarian cancer Neg Hx    • Breast cancer Neg Hx           Review of Systems   Respiratory: Negative  Cardiovascular: Negative  Gastrointestinal: Negative for constipation and diarrhea  O:  Blood pressure 126/78, height 5' 6 54" (1 69 m), weight 113 kg (250 lb 3 2 oz), last menstrual period 10/16/2022, not currently breastfeeding  Patient appears well and is not in distress, obese  Breasts are symmetrical without mass, tenderness, nipple discharge, skin changes or adenopathy  Abdomen is soft and nontender without masses  External genitals are normal without lesions or rashes  Urethral meatus and urethra are normal  Bladder is normal to palpation  Vagina is normal without discharge or bleeding  Cervix is normal without discharge or lesion  Uterus is normal, mobile, nontender without palpable mass  Adnexa are normal, nontender, without palpable mass  Difficult to evaluate pelvic contours in setting of obesity    A:  Yearly exam      P:   Pap & HPV today   OCPS ordered  Metformin ordered  Follow up if continued concerns        RTO one year for yearly exam or sooner as needed

## 2022-10-27 LAB
LAB AP GYN PRIMARY INTERPRETATION: NORMAL
Lab: NORMAL

## 2022-11-08 ENCOUNTER — APPOINTMENT (OUTPATIENT)
Dept: LAB | Facility: CLINIC | Age: 32
End: 2022-11-08

## 2022-11-08 DIAGNOSIS — E66.01 CLASS 3 SEVERE OBESITY DUE TO EXCESS CALORIES WITHOUT SERIOUS COMORBIDITY WITH BODY MASS INDEX (BMI) OF 40.0 TO 44.9 IN ADULT (HCC): ICD-10-CM

## 2022-11-09 LAB
THYROGLOB AB SERPL-ACNC: <1 IU/ML (ref 0–0.9)
THYROPEROXIDASE AB SERPL-ACNC: <8 IU/ML (ref 0–34)

## 2022-11-21 DIAGNOSIS — R42 DIZZINESS: ICD-10-CM

## 2022-11-21 RX ORDER — MECLIZINE HYDROCHLORIDE 25 MG/1
25 TABLET ORAL 3 TIMES DAILY PRN
Qty: 30 TABLET | Refills: 0 | Status: SHIPPED | OUTPATIENT
Start: 2022-11-21

## 2022-12-26 ENCOUNTER — OFFICE VISIT (OUTPATIENT)
Dept: URGENT CARE | Age: 32
End: 2022-12-26

## 2022-12-26 VITALS
TEMPERATURE: 97.8 F | HEART RATE: 88 BPM | DIASTOLIC BLOOD PRESSURE: 86 MMHG | RESPIRATION RATE: 18 BRPM | SYSTOLIC BLOOD PRESSURE: 178 MMHG

## 2022-12-26 DIAGNOSIS — R21 RASH: ICD-10-CM

## 2022-12-26 DIAGNOSIS — Z20.822 CONTACT WITH AND (SUSPECTED) EXPOSURE TO COVID-19: Primary | ICD-10-CM

## 2022-12-26 RX ORDER — PREDNISONE 20 MG/1
20 TABLET ORAL 2 TIMES DAILY WITH MEALS
Qty: 10 TABLET | Refills: 0 | Status: SHIPPED | OUTPATIENT
Start: 2022-12-26 | End: 2022-12-31

## 2022-12-26 NOTE — LETTER
December 26, 2022     Patient: Arturo Khan   YOB: 1990   Date of Visit: 12/26/2022       To Whom it May Concern:    Lisa Maciasr was seen in my clinic on 12/26/2022  She may return to work on 12/28/2022 or sooner if all test results are negative and she is fever free for 24 hrs without fever medication  If you have any questions or concerns, please don't hesitate to call           Sincerely,          [facility]        CC: No Recipients

## 2022-12-27 LAB
FLUAV RNA RESP QL NAA+PROBE: NEGATIVE
FLUBV RNA RESP QL NAA+PROBE: NEGATIVE
SARS-COV-2 RNA RESP QL NAA+PROBE: POSITIVE

## 2023-01-11 ENCOUNTER — OFFICE VISIT (OUTPATIENT)
Dept: FAMILY MEDICINE CLINIC | Facility: CLINIC | Age: 33
End: 2023-01-11

## 2023-01-11 VITALS
DIASTOLIC BLOOD PRESSURE: 84 MMHG | OXYGEN SATURATION: 97 % | RESPIRATION RATE: 18 BRPM | TEMPERATURE: 98.4 F | HEART RATE: 92 BPM | SYSTOLIC BLOOD PRESSURE: 144 MMHG | WEIGHT: 255 LBS | BODY MASS INDEX: 40.98 KG/M2 | HEIGHT: 66 IN

## 2023-01-11 DIAGNOSIS — B09 VIRAL RASH: Primary | ICD-10-CM

## 2023-01-11 RX ORDER — METHYLPREDNISOLONE 4 MG/1
TABLET ORAL
Qty: 21 EACH | Refills: 0 | Status: SHIPPED | OUTPATIENT
Start: 2023-01-11

## 2023-01-11 RX ORDER — HYDROXYZINE HYDROCHLORIDE 10 MG/1
10 TABLET, FILM COATED ORAL EVERY 6 HOURS PRN
Qty: 30 TABLET | Refills: 0 | Status: SHIPPED | OUTPATIENT
Start: 2023-01-11

## 2023-01-11 NOTE — PROGRESS NOTES
Name: Jim Chavez      : 1990      MRN: [id number]  Encounter Provider: Betsey Koyanagi, CRNP  Encounter Date: 2023   Encounter department: [address] [place] [address]     1  Viral rash  -     methylPREDNISolone 4 MG tablet therapy pack; Use as directed on package  -     hydrOXYzine HCL (ATARAX) 10 mg tablet; Take 1 tablet (10 mg total) by mouth every 6 (six) hours as needed for itching  Discussed with patient plan to treat with a Medrol dose pack for inflammation management and hydroxyzine for the itchiness  Patient instructed to call if no improvement in 72 hours or symptoms worsen       Subjective      32 y  o female presenting with a full body rash for the past few weeks  She reports that the rash first developed around the time she was diagnosed with COVID-19  She tested positive on 2022 and was given prednisone 20 mg for 3 days which did improve the rash  She reports that if she strains herself the rash gets more red and itchy  She has not started use of any new products prior to rash development  Review of Systems   Constitutional: Negative  Respiratory: Negative  Cardiovascular: Negative  Gastrointestinal: Negative  Skin: Positive for rash  Neurological: Negative  Psychiatric/Behavioral: Negative        Current Outpatient Medications on File Prior to Visit   Medication Sig   • cetirizine (ZyrTEC) 10 mg tablet Take 10 mg by mouth daily as needed    • fluticasone (FLONASE) 50 mcg/act nasal spray 1 spray into each nostril daily   • meclizine (ANTIVERT) 25 mg tablet Take 1 tablet (25 mg total) by mouth 3 (three) times a day as needed for dizziness   • metFORMIN (GLUCOPHAGE-XR) 500 mg 24 hr tablet Take 1 tablet (500 mg total) by mouth 2 (two) times a day with meals   • Multiple Vitamins-Minerals (MULTIVITAL-M PO) Take by mouth daily   • norethindrone-ethinyl estradiol (Loestrin Fe ) 1-20 MG-MCG per tablet Take 1 tablet by mouth daily • omeprazole (PriLOSEC) 40 MG capsule Take 20 mg by mouth daily        Objective     /84 (BP Location: Left arm, Patient Position: Sitting, Cuff Size: Large)   Pulse 92   Temp 98 4 °F (36 9 °C)   Resp 18   Ht 5' 6" (1 676 m)   Wt 116 kg (255 lb)   LMP 12/26/2022   SpO2 97%   BMI 41 16 kg/m² (Reviewed)    Physical Exam  Vitals reviewed  Constitutional:       General: She is not in acute distress  Appearance: She is well-developed and well-groomed  She is not ill-appearing  HENT:      Head: Normocephalic and atraumatic  Eyes:      General: Lids are normal       Extraocular Movements: Extraocular movements intact  Conjunctiva/sclera: Conjunctivae normal       Pupils: Pupils are equal, round, and reactive to light  Neck:      Trachea: Trachea and phonation normal    Cardiovascular:      Rate and Rhythm: Normal rate and regular rhythm  Heart sounds: Normal heart sounds  Pulmonary:      Effort: Pulmonary effort is normal       Breath sounds: Normal breath sounds  Skin:     General: Skin is warm and dry  Capillary Refill: Capillary refill takes less than 2 seconds  Findings: Rash present  Rash is macular and papular  Comments: Flat pink spots with some flesh colored slightly raised bumps on abdomen, lower back and upper arms  Neurological:      General: No focal deficit present  Mental Status: She is alert and oriented to person, place, and time  Psychiatric:         Mood and Affect: Mood normal          Behavior: Behavior normal  Behavior is cooperative  Thought Content:  Thought content normal        JAVED Turner

## 2023-01-17 ENCOUNTER — OFFICE VISIT (OUTPATIENT)
Dept: BARIATRICS | Facility: CLINIC | Age: 33
End: 2023-01-17

## 2023-01-17 ENCOUNTER — TELEPHONE (OUTPATIENT)
Dept: FAMILY MEDICINE CLINIC | Facility: CLINIC | Age: 33
End: 2023-01-17

## 2023-01-17 VITALS
SYSTOLIC BLOOD PRESSURE: 130 MMHG | HEIGHT: 67 IN | WEIGHT: 255.5 LBS | BODY MASS INDEX: 40.1 KG/M2 | TEMPERATURE: 98.4 F | HEART RATE: 100 BPM | DIASTOLIC BLOOD PRESSURE: 74 MMHG

## 2023-01-17 DIAGNOSIS — Z01.818 PRE-OPERATIVE CLEARANCE: Primary | ICD-10-CM

## 2023-01-17 DIAGNOSIS — E66.01 MORBID OBESITY (HCC): Primary | ICD-10-CM

## 2023-01-17 DIAGNOSIS — N92.6 IRREGULAR MENSTRUAL BLEEDING: ICD-10-CM

## 2023-01-17 DIAGNOSIS — K21.9 GERD (GASTROESOPHAGEAL REFLUX DISEASE): ICD-10-CM

## 2023-01-17 DIAGNOSIS — E78.00 ELEVATED CHOLESTEROL: ICD-10-CM

## 2023-01-17 DIAGNOSIS — K21.9 GASTROESOPHAGEAL REFLUX DISEASE, UNSPECIFIED WHETHER ESOPHAGITIS PRESENT: Primary | ICD-10-CM

## 2023-01-17 DIAGNOSIS — E66.01 OBESITY, CLASS III, BMI 40-49.9 (MORBID OBESITY) (HCC): ICD-10-CM

## 2023-01-17 DIAGNOSIS — E28.2 PCOS (POLYCYSTIC OVARIAN SYNDROME): ICD-10-CM

## 2023-01-17 RX ORDER — OMEPRAZOLE 40 MG/1
40 CAPSULE, DELAYED RELEASE ORAL DAILY
Qty: 30 CAPSULE | Refills: 6 | Status: SHIPPED | OUTPATIENT
Start: 2023-01-17 | End: 2023-02-08

## 2023-01-17 NOTE — TELEPHONE ENCOUNTER
Pt called stating she talked with the  for weight loss management who states the pt needs a letter from you supporting her surgery? Pt also states she takes omeprazole otc but now needs a script sent in to the pharmacy  [facility] uses the [facility]
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
## 2023-01-17 NOTE — TELEPHONE ENCOUNTER
Pt called back and asked if it needed to be sent somewhere  States the bariatric weight loss coordinator already received it
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
## 2023-02-08 ENCOUNTER — OFFICE VISIT (OUTPATIENT)
Dept: BARIATRICS | Facility: CLINIC | Age: 33
End: 2023-02-08

## 2023-02-08 VITALS
SYSTOLIC BLOOD PRESSURE: 130 MMHG | DIASTOLIC BLOOD PRESSURE: 80 MMHG | OXYGEN SATURATION: 98 % | WEIGHT: 249.5 LBS | TEMPERATURE: 98.1 F | HEART RATE: 83 BPM | HEIGHT: 67 IN | BODY MASS INDEX: 39.16 KG/M2

## 2023-02-08 DIAGNOSIS — K21.9 GASTROESOPHAGEAL REFLUX DISEASE, UNSPECIFIED WHETHER ESOPHAGITIS PRESENT: ICD-10-CM

## 2023-02-08 DIAGNOSIS — K21.9 GASTROESOPHAGEAL REFLUX DISEASE WITHOUT ESOPHAGITIS: ICD-10-CM

## 2023-02-08 DIAGNOSIS — E66.01 MORBID (SEVERE) OBESITY DUE TO EXCESS CALORIES (HCC): Primary | ICD-10-CM

## 2023-02-08 RX ORDER — OMEPRAZOLE 40 MG/1
40 CAPSULE, DELAYED RELEASE ORAL DAILY
Qty: 90 CAPSULE | Refills: 3 | Status: SHIPPED | OUTPATIENT
Start: 2023-02-08

## 2023-02-08 RX ORDER — VITAMIN B COMPLEX
1 CAPSULE ORAL DAILY
COMMUNITY

## 2023-02-08 RX ORDER — MULTIVIT WITH MINERALS/LUTEIN
1000 TABLET ORAL DAILY
COMMUNITY

## 2023-02-08 NOTE — PROGRESS NOTES
BARIATRIC CONSULT-INITIAL - BARIATRIC SURGERY  Tiny Carvajal 33 y o  female MRN: [id number]  Unit/Bed#:  Encounter: [id number]      HPI:  Judy Wall is a 33 y o  female who presents with morbid obesity to discuss weight loss options  Review of Systems    Historical Information   Past Medical History:   Diagnosis Date   • Abnormal Pap smear of cervix    • Allergic rhinitis    • Bartholin cyst    • GERD (gastroesophageal reflux disease)    • HPV in female    • Obesity    • PCOS (polycystic ovarian syndrome) 11/2019   • Polycystic ovary syndrome    • Varicella      Past Surgical History:   Procedure Laterality Date   • EXAMINATION UNDER ANESTHESIA N/A 9/18/2020    Procedure: EXAM UNDER ANESTHESIA (EUA); Surgeon: Joseline Medina MD;  [facility];  Service: Gynecology   • [place] MARSUPIALIZATION BARTHOLINS GLAND CYST N/A 9/18/2020    Procedure: Waldron Lawman CYST;  Surgeon: Joseline Medina MD;  [facility];  Service: Gynecology   • WISDOM TOOTH EXTRACTION       Social History   Social History     Substance and Sexual Activity   Alcohol Use Yes    Comment: social     Social History     Substance and Sexual Activity   Drug Use Never     Social History     Tobacco Use   Smoking Status Never   Smokeless Tobacco Never     Family History: non-contributory    Meds/Allergies   all medications and allergies reviewed  Allergies   Allergen Reactions   • Shrimp Extract Allergy Skin Test - Food Allergy Hives   • Other Other (See Comments)     Environmental       Objective       Current Vitals:   Blood Pressure: 130/80 (02/08/23 0906)  Pulse: 83 (02/08/23 0906)  Temperature: 98 1 °F (36 7 °C) (02/08/23 0906)  Temp Source: Tympanic (02/08/23 0906)  Height: 5' 6 5" (168 9 cm) (02/08/23 0906)  Weight - Scale: 113 kg (249 lb 8 oz) (02/08/23 0906)  SpO2: 98 % (02/08/23 0906)  Body mass index is 39 67 kg/m²  Invasive Devices     None                 Physical Exam    Lab Results:  I have personally reviewed pertinent lab results  Imaging: I have personally reviewed pertinent reports  EKG, Pathology, and Other Studies: I have personally reviewed pertinent reports  Code Status: [unfilled]  Advance Directive and Living Will:      Power of :    POLST:      Assessment/PLAN:            Patient has a long history of morbid obesity and is presenting to discuss the surgical weight loss options  Despite the patient best efforts patient was unable to lose any meaningful or sustainable weight using nonsurgical means  We had a long discussion regarding all the surgical weight-loss options at our disposal at this point and reviewed the risks and benefits of each procedure in details as it relates to her age, BMI and medical conditions  Patient elected to undergo Sleeve  Vs RYGB    Risks and benefits were explained to the patient  We also discussed the importance and need of a preoperative workup to make sure that the patient can undergo the procedure safely  Preoperative workup includes sleep apnea screening, cardiac evaluation, nutrition/psych and preoperative EGD  Risks and benefits of all the preoperative diagnostic tests were discussed with the patient including but not limited to the upper endoscopy  Alternatives to surgery and alternative forms of surgery were also explained  Postsurgical commitment and aftercare programs were discussed and explained to the patient in details       In terms of comorbidities patient suffers mostly of   Past Medical History:   Diagnosis Date   • Abnormal Pap smear of cervix    • Allergic rhinitis    • Bartholin cyst    • GERD (gastroesophageal reflux disease)    • HPV in female    • Obesity    • PCOS (polycystic ovarian syndrome) 11/2019   • Polycystic ovary syndrome    • Varicella        I informed the patient that the rate of resolution of comorbid conditions following weight loss surgery is between 60 and 90% depending on the severity of the specific medical condition  I discussed and educated the patient regarding the different components of our multidisciplinary program and the importance of compliance and follow-up in the postoperative period  All questions answered  Patient understands risks and benefits  An image of the procedure was also shown to the patient  After showing the image we discussed all the technical aspects of the procedure and also the potential complications including but not limited to gastrointestinal perforation, leak, obstruction, stricture and hemorrhage  I spent 30 min with the patient more than 50% of the time was spent educating the patient and coordinating care

## 2023-02-08 NOTE — H&P (VIEW-ONLY)
BARIATRIC CONSULT-INITIAL - BARIATRIC SURGERY  Scar Carvajal 33 y o  female MRN: [id number]  Unit/Bed#:  Encounter: [id number]      HPI:  Celine Rubin is a 33 y o  female who presents with morbid obesity to discuss weight loss options  Review of Systems    Historical Information   Past Medical History:   Diagnosis Date   • Abnormal Pap smear of cervix    • Allergic rhinitis    • Bartholin cyst    • GERD (gastroesophageal reflux disease)    • HPV in female    • Obesity    • PCOS (polycystic ovarian syndrome) 11/2019   • Polycystic ovary syndrome    • Varicella      Past Surgical History:   Procedure Laterality Date   • EXAMINATION UNDER ANESTHESIA N/A 9/18/2020    Procedure: EXAM UNDER ANESTHESIA (EUA); Surgeon: Sharon Zamarripa MD;  [facility];  Service: Gynecology   • HI MARSUPIALIZATION BARTHOLINS GLAND CYST N/A 9/18/2020    Procedure: Eneida Luma CYST;  Surgeon: Sharon Zamarripa MD;  [facility];  Service: Gynecology   • WISDOM TOOTH EXTRACTION       Social History   Social History     Substance and Sexual Activity   Alcohol Use Yes    Comment: social     Social History     Substance and Sexual Activity   Drug Use Never     Social History     Tobacco Use   Smoking Status Never   Smokeless Tobacco Never     Family History: non-contributory    Meds/Allergies   all medications and allergies reviewed  Allergies   Allergen Reactions   • Shrimp Extract Allergy Skin Test - Food Allergy Hives   • Other Other (See Comments)     Environmental       Objective       Current Vitals:   Blood Pressure: 130/80 (02/08/23 0906)  Pulse: 83 (02/08/23 0906)  Temperature: 98 1 °F (36 7 °C) (02/08/23 0906)  Temp Source: Tympanic (02/08/23 0906)  Height: 5' 6 5" (168 9 cm) (02/08/23 0906)  Weight - Scale: 113 kg (249 lb 8 oz) (02/08/23 0906)  SpO2: 98 % (02/08/23 0906)  Body mass index is 39 67 kg/m²  Invasive Devices     None                 Physical Exam    Lab Results:  I have personally reviewed pertinent lab results  Imaging: I have personally reviewed pertinent reports  EKG, Pathology, and Other Studies: I have personally reviewed pertinent reports  Code Status: [unfilled]  Advance Directive and Living Will:      Power of :    POLST:      Assessment/PLAN:            Patient has a long history of morbid obesity and is presenting to discuss the surgical weight loss options  Despite the patient best efforts patient was unable to lose any meaningful or sustainable weight using nonsurgical means  We had a long discussion regarding all the surgical weight-loss options at our disposal at this point and reviewed the risks and benefits of each procedure in details as it relates to her age, BMI and medical conditions  Patient elected to undergo Sleeve  Vs RYGB    Risks and benefits were explained to the patient  We also discussed the importance and need of a preoperative workup to make sure that the patient can undergo the procedure safely  Preoperative workup includes sleep apnea screening, cardiac evaluation, nutrition/psych and preoperative EGD  Risks and benefits of all the preoperative diagnostic tests were discussed with the patient including but not limited to the upper endoscopy  Alternatives to surgery and alternative forms of surgery were also explained  Postsurgical commitment and aftercare programs were discussed and explained to the patient in details       In terms of comorbidities patient suffers mostly of   Past Medical History:   Diagnosis Date   • Abnormal Pap smear of cervix    • Allergic rhinitis    • Bartholin cyst    • GERD (gastroesophageal reflux disease)    • HPV in female    • Obesity    • PCOS (polycystic ovarian syndrome) 11/2019   • Polycystic ovary syndrome    • Varicella        I informed the patient that the rate of resolution of comorbid conditions following weight loss surgery is between 60 and 90% depending on the severity of the specific medical condition  I discussed and educated the patient regarding the different components of our multidisciplinary program and the importance of compliance and follow-up in the postoperative period  All questions answered  Patient understands risks and benefits  An image of the procedure was also shown to the patient  After showing the image we discussed all the technical aspects of the procedure and also the potential complications including but not limited to gastrointestinal perforation, leak, obstruction, stricture and hemorrhage  I spent 30 min with the patient more than 50% of the time was spent educating the patient and coordinating care

## 2023-02-13 ENCOUNTER — PREP FOR PROCEDURE (OUTPATIENT)
Dept: BARIATRICS | Facility: CLINIC | Age: 33
End: 2023-02-13

## 2023-02-13 DIAGNOSIS — E66.01 MORBID OBESITY (HCC): Primary | ICD-10-CM

## 2023-02-16 RX ORDER — SODIUM CHLORIDE 9 MG/ML
125 INJECTION, SOLUTION INTRAVENOUS CONTINUOUS
Status: CANCELLED | OUTPATIENT
Start: 2023-02-16

## 2023-02-20 ENCOUNTER — HOSPITAL ENCOUNTER (OUTPATIENT)
Dept: GASTROENTEROLOGY | Facility: HOSPITAL | Age: 33
Setting detail: OUTPATIENT SURGERY
Discharge: HOME | End: 2023-02-20
Attending: SURGERY

## 2023-02-20 ENCOUNTER — ANESTHESIA EVENT (OUTPATIENT)
Dept: GASTROENTEROLOGY | Facility: HOSPITAL | Age: 33
End: 2023-02-20

## 2023-02-20 ENCOUNTER — ANESTHESIA (OUTPATIENT)
Dept: GASTROENTEROLOGY | Facility: HOSPITAL | Age: 33
End: 2023-02-20

## 2023-02-20 VITALS
HEART RATE: 90 BPM | OXYGEN SATURATION: 98 % | TEMPERATURE: 98.5 F | SYSTOLIC BLOOD PRESSURE: 122 MMHG | RESPIRATION RATE: 19 BRPM | DIASTOLIC BLOOD PRESSURE: 83 MMHG

## 2023-02-20 DIAGNOSIS — E66.01 MORBID OBESITY (HCC): ICD-10-CM

## 2023-02-20 PROBLEM — E66.9 OBESITY: Status: ACTIVE | Noted: 2019-08-05

## 2023-02-20 LAB
EXT PREGNANCY TEST URINE: NEGATIVE
EXT. CONTROL: NORMAL

## 2023-02-20 RX ORDER — PROPOFOL 10 MG/ML
INJECTION, EMULSION INTRAVENOUS AS NEEDED
Status: DISCONTINUED | OUTPATIENT
Start: 2023-02-20 | End: 2023-02-20

## 2023-02-20 RX ORDER — LIDOCAINE HYDROCHLORIDE 20 MG/ML
INJECTION, SOLUTION EPIDURAL; INFILTRATION; INTRACAUDAL; PERINEURAL AS NEEDED
Status: DISCONTINUED | OUTPATIENT
Start: 2023-02-20 | End: 2023-02-20

## 2023-02-20 RX ORDER — SODIUM CHLORIDE 9 MG/ML
125 INJECTION, SOLUTION INTRAVENOUS CONTINUOUS
Status: DISCONTINUED | OUTPATIENT
Start: 2023-02-20 | End: 2023-02-24 | Stop reason: HOSPADM

## 2023-02-20 RX ADMIN — SODIUM CHLORIDE 125 ML/HR: 0.9 INJECTION, SOLUTION INTRAVENOUS at 13:20

## 2023-02-20 RX ADMIN — LIDOCAINE HYDROCHLORIDE 100 MG: 20 INJECTION, SOLUTION EPIDURAL; INFILTRATION; INTRACAUDAL; PERINEURAL at 13:41

## 2023-02-20 RX ADMIN — PROPOFOL 150 MG: 10 INJECTION, EMULSION INTRAVENOUS at 13:41

## 2023-02-20 NOTE — INTERVAL H&P NOTE
H&P reviewed  After examining the patient I find no changes in the patients condition since the H&P had been written      Vitals:    02/20/23 1251   BP: 135/77   Pulse: 88   Resp: 17   Temp: 98 5 °F (36 9 °C)   SpO2: 97%

## 2023-02-20 NOTE — ANESTHESIA POSTPROCEDURE EVALUATION
Post-Op Assessment Note    CV Status:  Stable    Pain management: adequate     Mental Status:  Alert and awake   Hydration Status:  Euvolemic   PONV Controlled:  Controlled   Airway Patency:  Patent      Post Op Vitals Reviewed: Yes      Staff: Anesthesiologist         No notable events documented      /83 (02/20/23 1404)    Temp      Pulse 90 (02/20/23 1404)   Resp 19 (02/20/23 1404)    SpO2 98 % (02/20/23 1404)

## 2023-02-20 NOTE — ANESTHESIA PREPROCEDURE EVALUATION
Procedure:  EGD    Relevant Problems   ANESTHESIA (within normal limits)      GI/HEPATIC   (+) GERD (gastroesophageal reflux disease)      Endocrine   (+) PCOS (polycystic ovarian syndrome)      Other   (+) Obesity        Physical Exam    Airway    Mallampati score: II  TM Distance: >3 FB  Neck ROM: full     Dental       Cardiovascular  Rhythm: regular, Rate: normal,     Pulmonary  Breath sounds clear to auscultation,     Other Findings        Anesthesia Plan  ASA Score- 2     Anesthesia Type- IV sedation with anesthesia with ASA Monitors  Additional Monitors:   Airway Plan:           Plan Factors-Exercise tolerance (METS): <4 METS  Chart reviewed  Patient summary reviewed  Patient is not a current smoker  Induction- intravenous  Postoperative Plan-     Informed Consent- Anesthetic plan and risks discussed with patient

## 2023-03-10 ENCOUNTER — OFFICE VISIT (OUTPATIENT)
Dept: BARIATRICS | Facility: CLINIC | Age: 33
End: 2023-03-10

## 2023-03-10 VITALS
BODY MASS INDEX: 38.3 KG/M2 | DIASTOLIC BLOOD PRESSURE: 70 MMHG | SYSTOLIC BLOOD PRESSURE: 120 MMHG | WEIGHT: 244 LBS | HEIGHT: 67 IN | TEMPERATURE: 97 F | HEART RATE: 71 BPM

## 2023-03-10 DIAGNOSIS — E66.9 OBESITY, CLASS II, BMI 35-39.9: ICD-10-CM

## 2023-03-10 DIAGNOSIS — K21.9 GASTROESOPHAGEAL REFLUX DISEASE WITHOUT ESOPHAGITIS: Primary | ICD-10-CM

## 2023-03-10 NOTE — PROGRESS NOTES
Assessment/Plan:    Diagnoses and all orders for this visit:    Gastroesophageal reflux disease without esophagitis    Obesity, Class II, BMI 35-39 9    - takes omeprazole 40 mg PO QD - if she misses a dose it would exacerbate  Interested in Vertical Sleeve Gastrectomy with Dr Donte Salazar  10% Weight loss-Not applicable   Screening labs- completed  Will need new TSH, lipid panel if surgery after June 2023  Support Group-Completed - February 8th   Cardiac Risk Assessment- not completed - has an appt on April 7th, 2023  Upper Endoscopy-Completed; H  Pylori biopsy-Negative  Sleep Medicine Assessment- not completed - 03/13/2023  Alcohol and nicotine use is not recommended following bariatric surgery  NSAIDs should be avoided following bariatric surgery  Advised that pregnancy should be avoided following bariatric surgery for 12-18 months and should not solely rely on OCP and consider additional/alternative sources for contraception due to potential absorption issues-Completed    Follow up in approximately 2 weeks with Surgical Dietician and Surgical   Goals:  Food log (ie ) [http://www  gopogo,sparkpeople  com,loseit com,calorieking  com,etc]www  myfitnesspal com,sparkpeople  com,loseit com,calorieking  com,etc  baritastic  No sugary beverages  At least 64oz of water daily  Increase physical activity by 10 minutes daily   Gradually increase physical activity to a goal of 5 days per week for 30 minutes of MODERATE intensity PLUS 2 days per week of FULL BODY resistance training  Follow lessons 1-6 in the manual  Follow the 30/60 minute rule  Goal protein intake of 60-80 grams per day  Alcohol and nicotine use is not recommended following bariatric surgery  NSAIDs (Motrin, Advil, Aleve, Naproxen, ibuprofen, etc) should be avoided following bariatric surgery)    Subjective:   Chief Complaint   Patient presents with   • Follow-up     2/4  [place] [id number] [employer] [place]     Patient ID: Frank Argueta is a 33 y o  female with excess weight/obesity here to pursue weight management  Past Medical History:   Diagnosis Date   • Abnormal Pap smear of cervix    • Allergic rhinitis    • Bartholin cyst    • GERD (gastroesophageal reflux disease)    • HPV in female    • Obesity    • PCOS (polycystic ovarian syndrome) 11/2019   • Polycystic ovary syndrome    • Varicella      HPI:  The patient has been:  Following the lessons in the manual- yes  Practicing the 30/60 minute rule- yes - doing 30/30 minute rule  Food logging- yes  Exercise- no  Alcohol- no  Tobacco- no  NSAIDs- no  The following portions of the patient's history were reviewed and updated as appropriate: allergies, current medications, past family history, past medical history, past social history, past surgical history and problem list   Review of Systems   Constitutional: Negative  Respiratory: Negative  Cardiovascular: Negative  Gastrointestinal:        Heartburn     Musculoskeletal: Negative  Neurological: Negative  Psychiatric/Behavioral: Negative  Objective:  /70   Pulse 71   Temp (!) 97 °F (36 1 °C) (Tympanic)   Ht 5' 6 5" (1 689 m)   Wt 111 kg (244 lb)   BMI 38 79 kg/m²   Physical Exam  Vitals and nursing note reviewed  Constitutional:       Appearance: Normal appearance  She is obese  Cardiovascular:      Rate and Rhythm: Normal rate and regular rhythm  Pulses: Normal pulses  Heart sounds: Normal heart sounds  Pulmonary:      Effort: Pulmonary effort is normal       Breath sounds: Normal breath sounds  Abdominal:      General: Bowel sounds are normal       Palpations: Abdomen is soft  Tenderness: There is no abdominal tenderness  Musculoskeletal:         General: Normal range of motion  Skin:     General: Skin is warm and dry  Neurological:      General: No focal deficit present  Mental Status: She is alert and oriented to person, place, and time     Psychiatric:         Mood and Affect: Mood normal  Behavior: Behavior normal          Thought Content:  Thought content normal          Judgment: Judgment normal

## 2023-03-13 ENCOUNTER — OFFICE VISIT (OUTPATIENT)
Dept: PULMONOLOGY | Facility: CLINIC | Age: 33
End: 2023-03-13

## 2023-03-13 VITALS
WEIGHT: 245 LBS | DIASTOLIC BLOOD PRESSURE: 70 MMHG | HEIGHT: 67 IN | BODY MASS INDEX: 38.45 KG/M2 | SYSTOLIC BLOOD PRESSURE: 112 MMHG | OXYGEN SATURATION: 97 % | TEMPERATURE: 98 F | HEART RATE: 100 BPM

## 2023-03-13 DIAGNOSIS — R29.818 SUSPECTED SLEEP APNEA: Primary | ICD-10-CM

## 2023-03-13 DIAGNOSIS — E66.01 MORBID OBESITY (HCC): ICD-10-CM

## 2023-03-13 NOTE — ASSESSMENT & PLAN NOTE
No significant excessive daytime sleepiness due to ERIKA  Symptoms only mild snoring  Crowded airway on exam with Mallampati 4  She will undergo home sleep testing  If diagnosed with mild ERIKA, treatment is dependent on patient choice  She does not have concurrent asthma, hypertension, mood disorder, or excessive daytime sleepiness  The presence of mild untreated ERIKA has been associated with a slightly higher risk of post-operative pulmonary complications compared to those without ERIKA but this risk is substantially lower compared to untreated moderate to severe ERIKA    If diagnosed with moderate to severe ERIKA I would recommend a trial on CPAP due to the associated increase in perioperative risk of pulmonary complications in untreated moderate to severe ERIKA in the setting of bariatric surgery  The optimal duration of CPAP use before bariatric surgery is unclear based on literature  If diagnosed with mild ERIKA I would recommend treatment if the patient is amenable

## 2023-03-13 NOTE — PROGRESS NOTES
Sleep Consultation   Ortiz Kurtz 29 y o  female MRN: [id number]      Reason for consultation: Bariatric surgery evaluation    Requesting physician: Joselo Mooney MD    Assessment/Plan    81-year-old female with class II obesity, PCOS, GERD, and allergies who presents today for evaluation of sleep apnea for bariatric surgery status  1  Suspected sleep apnea-based on her symptoms of snoring, neck size, BMI, and crowded airways on exam she has moderate risk of sleep apnea  -A home sleep study has been ordered to evaluate for sleep apnea  -If positive for sleep apnea we will order CPAP  -Consequences of untreated sleep apnea and perioperative complications related to bariatric surgery discussed with the patient   -She is willing to try CPAP if positive for sleep apnea   -We discussed that when she has bariatric surgery and is able to lose significant weight then we can repeat the study depending on what her results are on the initial sleep study   -Follow-up in 3 months  2  Obesity with BMI 38 95-patient is being followed by bariatric surgery and is undergoing gastric sleeve surgery tentatively in June/July 2023   -Follow-up after the home sleep study  History of Present Illness   HPI:  Ortiz Kurtz is a 29 y o  female with class 2 obesity, PCOS, GERD, and allergies is here for evaluation of sleep apnea  Patient is being evaluated for bariatric surgery and is planning to undergo gastric sleeve surgery in June/July 2023  Patient has history of mild snoring but otherwise denies any other symptoms of sleep apnea  She denies any episodes of witnessed apneas, does not wake up with choking/gasping, morning headaches, or excessive daytime sleepiness  Her ESS is 5/24  She gained about 30 lbs in the past year  She denies any symptoms of restless legs, sleep paralysis, cataplexy, or hallucinations  She denies any dry mouth when she wakes up in the morning    Sleep schedule: She goes to bed at 9 PM on weekdays and 10 PM on weekends and falls asleep in less than 30 minutes  She wakes up at 5:30 AM during weekdays and 7 AM during weekends and feels well rested  She does not take naps during the daytime  She wakes up 1-2 times during the night to go to the bathroom and she drinks a lot of water before she goes to bed  Review of Systems      Genitourinary none   Cardiology none   Gastrointestinal frequent heartburn/acid reflux   Neurology none   Constitutional weight change   Integumentary none   Psychiatry none   Musculoskeletal sciatica   Pulmonary snoring   ENT ringing in ears   Endocrine excessive thirst and frequent urination   Hematological none               Historical Information   Past Medical History:   Diagnosis Date   • Abnormal Pap smear of cervix    • Allergic rhinitis    • Bartholin cyst    • GERD (gastroesophageal reflux disease)    • HPV in female    • Obesity    • PCOS (polycystic ovarian syndrome) 11/2019   • Polycystic ovary syndrome    • Varicella      Past Surgical History:   Procedure Laterality Date   • EXAMINATION UNDER ANESTHESIA N/A 9/18/2020    Procedure: EXAM UNDER ANESTHESIA (EUA);   Surgeon: Mel Craig MD;  [facility];  Service: Gynecology   • IN MARSUPIALIZATION [address] GLAND CYST N/A 9/18/2020    Procedure: [place] CYST;  Surgeon: Mel Craig MD;  [facility];  Service: Gynecology   • WISDOM TOOTH EXTRACTION       Family History   Problem Relation Age of Onset   • Drug abuse Mother         suicide 2011   • Hypertension Father    • Diabetes Father    • Asthma Father    • Gestational diabetes Sister         twin   • Asthma Sister    • No Known Problems Sister         drug addiction   • No Known Problems Sister    • No Known Problems Brother    • No Known Problems Brother    • Diabetes Maternal Grandmother    • Heart failure Maternal Grandmother    • Diabetes Paternal Grandmother    • Diabetes Paternal Grandfather    • Heart failure Paternal Grandfather    • Coronary artery disease Family    • Diabetes Family    • Colon cancer Neg Hx    • Ovarian cancer Neg Hx    • Breast cancer Neg Hx      Social History     Socioeconomic History   • Marital status: /Civil Union     Spouse name: Not on file   • Number of children: Not on file   • Years of education: Not on file   • Highest education level: Not on file   Occupational History   • Not on file   Tobacco Use   • Smoking status: Never   • Smokeless tobacco: Never   Vaping Use   • Vaping Use: Never used   Substance and Sexual Activity   • Alcohol use: Yes     Comment: social   • Drug use: Never   • Sexual activity: Yes     Partners: Male     Birth control/protection: OCP   Other Topics Concern   • Not on file   Social History Narrative   • Not on file     Social Determinants of Health     Financial Resource Strain: Not on file   Food Insecurity: Not on file   Transportation Needs: Not on file   Physical Activity: Not on file   Stress: Not on file   Social Connections: Not on file   Intimate Partner Violence: Not on file   Housing Stability: Not on file       Occupational History: [employer]   Meds/Allergies   Allergies   Allergen Reactions   • Shrimp Extract Allergy Skin Test - Food Allergy Hives   • Other Other (See Comments)     Environmental       Home medications:  Prior to Admission medications    Medication Sig Start Date End Date Taking? Authorizing Provider   Ascorbic Acid (vitamin C) 1000 MG tablet Take 1,000 mg by mouth daily    Historical Provider, MD   b complex vitamins capsule Take 1 capsule by mouth daily    Historical Provider, MD   cetirizine (ZyrTEC) 10 mg tablet Take 10 mg by mouth daily as needed     Historical Provider, MD   fluticasone (FLONASE) 50 mcg/act nasal spray 1 spray into each nostril daily 4/22/20   Fidel Crowley Mc, MD   MAGNESIUM PO Take 1 tablet by mouth in the morning    Historical Provider, MD   meclizine (ANTIVERT) 25 mg tablet Take 1 tablet (25 mg total) by mouth 3 (three) times a day as needed for dizziness 11/21/22   JAVED Cox   metFORMIN (GLUCOPHAGE-XR) 500 mg 24 hr tablet Take 1 tablet (500 mg total) by mouth 2 (two) times a day with meals 10/19/22   Don Bergeron MD   Multiple Vitamins-Minerals (MULTIVITAL-M PO) Take by mouth daily    Historical Provider, MD   norethindrone-ethinyl estradiol (Loestrin Fe 1/20) 1-20 MG-MCG per tablet Take 1 tablet by mouth daily 10/19/22   Don Bergeron MD   Omega-3 Fatty Acids (FISH OIL PO) Take 2 tablets by mouth in the morning    Historical Provider, MD   omeprazole (PriLOSEC) 40 MG capsule TAKE 1 CAPSULE (40 MG TOTAL) BY MOUTH DAILY  2/8/23   JAVED Cox   hydrOXYzine HCL (ATARAX) 10 mg tablet Take 1 tablet (10 mg total) by mouth every 6 (six) hours as needed for itching 1/11/23 3/13/23  JAVED Cox   methylPREDNISolone 4 MG tablet therapy pack Use as directed on package 1/11/23 3/13/23  JAVED Cox       Vitals:   Blood pressure 112/70, pulse 100, temperature 98 °F (36 7 °C), height 5' 6 5" (1 689 m), weight 111 kg (245 lb), SpO2 97 %, not currently breastfeeding ,  Neck circumference 17 inches, Body mass index is 38 95 kg/m²  Physical Exam  General: Obese, Awake alert and oriented x 3, conversant without conversational dyspnea, NAD, normal affect  HEENT:  PERRL, Sclera noninjected, nonicteric OU, Nares patent,  no craniofacial abnormalities, Mucous membranes, moist, no oral lesions, normal dentition, Mallampati class 4  NECK: Trachea midline, no accessory muscle use, no stridor, no cervical or supraclavicular adenopathy, JVP not elevated  CARDIAC: Reg, single s1/S2, no m/r/g  PULM: CTA bilaterally no wheezing, rhonchi or rales  EXT: No cyanosis, no clubbing, no edema, normal capillary refill  NEURO: no focal neurologic deficits, AAOx3, moving all extremities appropriately    Labs:  I have personally reviewed pertinent lab results    Lab Results   Component Value Date    WBC 6 95 06/13/2022    HGB 15 1 06/13/2022    HCT 44 9 06/13/2022    MCV 94 06/13/2022     06/13/2022      Lab Results   Component Value Date    CALCIUM 9 8 06/13/2022    K 4 2 06/13/2022    CO2 27 06/13/2022     06/13/2022    BUN 10 06/13/2022    CREATININE 0 76 06/13/2022     No results found for: IRON, TIBC, FERRITIN  No results found for: [id number]  No results found for: MD Harjeet Lozoya[facility] Sleep Fellow

## 2023-03-14 ENCOUNTER — OFFICE VISIT (OUTPATIENT)
Dept: OBGYN CLINIC | Facility: CLINIC | Age: 33
End: 2023-03-14

## 2023-03-14 ENCOUNTER — TELEPHONE (OUTPATIENT)
Dept: OBGYN CLINIC | Facility: CLINIC | Age: 33
End: 2023-03-14

## 2023-03-14 VITALS — WEIGHT: 247 LBS | DIASTOLIC BLOOD PRESSURE: 80 MMHG | BODY MASS INDEX: 39.27 KG/M2 | SYSTOLIC BLOOD PRESSURE: 138 MMHG

## 2023-03-14 DIAGNOSIS — Z30.09 GENERAL COUNSELING AND ADVICE FOR CONTRACEPTIVE MANAGEMENT: Primary | ICD-10-CM

## 2023-03-14 DIAGNOSIS — B96.89 BACTERIAL VAGINITIS: ICD-10-CM

## 2023-03-14 DIAGNOSIS — N76.0 BACTERIAL VAGINITIS: ICD-10-CM

## 2023-03-14 DIAGNOSIS — N93.0 POSTCOITAL BLEEDING: ICD-10-CM

## 2023-03-14 RX ORDER — METRONIDAZOLE 500 MG/1
500 TABLET ORAL EVERY 12 HOURS SCHEDULED
Qty: 14 TABLET | Refills: 0 | Status: SHIPPED | OUTPATIENT
Start: 2023-03-14 | End: 2023-03-21

## 2023-03-14 NOTE — PROGRESS NOTES
Assessment/Plan:    Exam c/w BV - rx sent  Pelvic US ordered  Nexplanon discussed in detail, task to medical device pool for pre-cert  Pap and cultures up to date  Will call when pre-cert complete to schedule insertion  General counseling and advice for contraceptive management    Bacterial vaginitis    Postcoital bleeding  -     US pelvis complete w transvaginal; Future        Subjective:      Patient ID: Ortiz Kurtz is a 29 y o  female  Erin Miltons presents because she would like to discuss Nexplanon  She is currently on OCPs  She is planning an upcoming sleeve gastrectomy in the summer, and ultimately needs a more reliable form of contraception  Would like to try for pregnancy after the 18mos recommended by bariatric surgery  She is also noting some post-coital spotting as well as bleeding with bowel movements - and this has been worsening  Also having some pelvic pain  The following portions of the patient's history were reviewed and updated as appropriate: allergies, current medications and problem list     Review of Systems   Genitourinary: Positive for pelvic pain and vaginal bleeding  Objective:      /80 (BP Location: Right arm, Patient Position: Sitting, Cuff Size: Standard)   Wt 112 kg (247 lb)   LMP 03/09/2023 (Exact Date)   BMI 39 27 kg/m²          Physical Exam  Vitals and nursing note reviewed  Constitutional:       Appearance: Normal appearance  She is obese  Genitourinary:     General: Normal vulva  Labia:         Right: No rash or lesion  Left: No rash or lesion  Vagina: Vaginal discharge present  Cervix: Normal       Uterus: Normal  Not tender  Adnexa: Left adnexa normal         Right: No tenderness  Left: No tenderness  Neurological:      Mental Status: She is alert

## 2023-03-15 NOTE — TELEPHONE ENCOUNTER
I called [facility] insurance Nexplanon is cover 100% no copay, no deductible   Patient is scheduled for Nexplanon insertion on 4/28/2023 @ 10 am

## 2023-03-22 ENCOUNTER — HOSPITAL ENCOUNTER (OUTPATIENT)
Dept: ULTRASOUND IMAGING | Facility: HOSPITAL | Age: 33
Discharge: HOME | End: 2023-03-22
Attending: OBSTETRICS & GYNECOLOGY

## 2023-03-22 DIAGNOSIS — N93.0 POSTCOITAL BLEEDING: ICD-10-CM

## 2023-04-04 ENCOUNTER — APPOINTMENT (OUTPATIENT)
Dept: LAB | Facility: CLINIC | Age: 33
End: 2023-04-04

## 2023-04-04 ENCOUNTER — HOSPITAL ENCOUNTER (OUTPATIENT)
Dept: SLEEP CENTER | Facility: CLINIC | Age: 33
Discharge: HOME | End: 2023-04-04

## 2023-04-04 DIAGNOSIS — E66.01 MORBID OBESITY (HCC): ICD-10-CM

## 2023-04-04 DIAGNOSIS — E28.2 PCOS (POLYCYSTIC OVARIAN SYNDROME): ICD-10-CM

## 2023-04-04 DIAGNOSIS — N92.6 IRREGULAR MENSTRUAL BLEEDING: ICD-10-CM

## 2023-04-04 DIAGNOSIS — E66.01 OBESITY, CLASS III, BMI 40-49.9 (MORBID OBESITY) (HCC): ICD-10-CM

## 2023-04-04 DIAGNOSIS — K21.9 GERD (GASTROESOPHAGEAL REFLUX DISEASE): ICD-10-CM

## 2023-04-04 DIAGNOSIS — Z01.818 PRE-OPERATIVE CLEARANCE: ICD-10-CM

## 2023-04-04 DIAGNOSIS — E78.00 ELEVATED CHOLESTEROL: ICD-10-CM

## 2023-04-04 DIAGNOSIS — R29.818 SUSPECTED SLEEP APNEA: ICD-10-CM

## 2023-04-04 LAB
ALBUMIN SERPL BCP-MCNC: 3.8 G/DL (ref 3.5–5)
ALP SERPL-CCNC: 57 U/L (ref 46–116)
ALT SERPL W P-5'-P-CCNC: 30 U/L (ref 12–78)
ANION GAP SERPL CALCULATED.3IONS-SCNC: 4 MMOL/L (ref 4–13)
AST SERPL W P-5'-P-CCNC: 18 U/L (ref 5–45)
BILIRUB SERPL-MCNC: 0.21 MG/DL (ref 0.2–1)
BUN SERPL-MCNC: 12 MG/DL (ref 5–25)
CALCIUM SERPL-MCNC: 9.6 MG/DL (ref 8.3–10.1)
CHLORIDE SERPL-SCNC: 108 MMOL/L (ref 96–108)
CO2 SERPL-SCNC: 27 MMOL/L (ref 21–32)
CREAT SERPL-MCNC: 0.76 MG/DL (ref 0.6–1.3)
ERYTHROCYTE [DISTWIDTH] IN BLOOD BY AUTOMATED COUNT: 11.7 % (ref 11.6–15.1)
GFR SERPL CREATININE-BSD FRML MDRD: 104 ML/MIN/1.73SQ M
GLUCOSE P FAST SERPL-MCNC: 100 MG/DL (ref 65–99)
HCT VFR BLD AUTO: 43.2 % (ref 34.8–46.1)
HGB BLD-MCNC: 14.2 G/DL (ref 11.5–15.4)
MCH RBC QN AUTO: 31.2 PG (ref 26.8–34.3)
MCHC RBC AUTO-ENTMCNC: 32.9 G/DL (ref 31.4–37.4)
MCV RBC AUTO: 95 FL (ref 82–98)
PLATELET # BLD AUTO: 197 THOUSANDS/UL (ref 149–390)
PMV BLD AUTO: 10.7 FL (ref 8.9–12.7)
POTASSIUM SERPL-SCNC: 4.2 MMOL/L (ref 3.5–5.3)
PROT SERPL-MCNC: 7.4 G/DL (ref 6.4–8.4)
RBC # BLD AUTO: 4.55 MILLION/UL (ref 3.81–5.12)
SODIUM SERPL-SCNC: 139 MMOL/L (ref 135–147)
WBC # BLD AUTO: 6.14 THOUSAND/UL (ref 4.31–10.16)
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
## 2023-04-06 NOTE — PROGRESS NOTES
Home Sleep Study Documentation    HOME STUDY DEVICE: Noxturnal no                                           Leah G3 yes      Pre-Sleep Home Study:    Set-up and instructions performed by: Omid Wayne performed demonstration for Patient: yes    Return demonstration performed by Patient: yes    Written instructions provided to Patient: yes    Patient signed consent form: yes        Post-Sleep Home Study:    Additional comments by Patient: None    Home Sleep Study Failed:no:    Failure reason: N/A    Reported or Detected: N/A    Scored by: Ramon Kussmaul

## 2023-04-07 ENCOUNTER — OFFICE VISIT (OUTPATIENT)
Dept: BARIATRICS | Facility: CLINIC | Age: 33
End: 2023-04-07

## 2023-04-07 ENCOUNTER — OFFICE VISIT (OUTPATIENT)
Dept: CARDIOLOGY CLINIC | Facility: CLINIC | Age: 33
End: 2023-04-07

## 2023-04-07 VITALS
WEIGHT: 240 LBS | HEIGHT: 66 IN | SYSTOLIC BLOOD PRESSURE: 110 MMHG | HEART RATE: 74 BPM | BODY MASS INDEX: 38.57 KG/M2 | DIASTOLIC BLOOD PRESSURE: 82 MMHG

## 2023-04-07 VITALS — BODY MASS INDEX: 38.8 KG/M2 | HEIGHT: 66 IN | WEIGHT: 241.4 LBS

## 2023-04-07 DIAGNOSIS — E66.01 MORBID OBESITY (HCC): ICD-10-CM

## 2023-04-07 DIAGNOSIS — R29.818 SUSPECTED SLEEP APNEA: ICD-10-CM

## 2023-04-07 DIAGNOSIS — E78.2 MIXED HYPERLIPIDEMIA: ICD-10-CM

## 2023-04-07 DIAGNOSIS — E66.9 OBESITY, CLASS II, BMI 35-39.9: Primary | ICD-10-CM

## 2023-04-07 DIAGNOSIS — Z01.810 PRE-OPERATIVE CARDIOVASCULAR EXAMINATION: Primary | ICD-10-CM

## 2023-04-07 NOTE — PROGRESS NOTES
[facility]    CHIEF COMPLAINT:   Chief Complaint   Patient presents with   • New Patient Visit     Patient needs cardiac clearance for bariatric surgery  HPI:  Lázaro Calderón is a 29 y o  female with a past medical history of obesity, hyperlipidemia, PCOS, and suspected sleep apnea today for preoperative risk assessment  She is tentatively planned to have gastric sleeve surgery sometime in 2023  She denies any history of diabetes mellitus, chronic kidney disease, TIA/CVA, myocardial infarction, congestive heart failure, DVT/PE  She takes metformin for PCOS and fish oil for high triglycerides  She had a sleep study performed and results are pending  She does not have a designated exercise regimen however, she walks about 6000 steps per day for her job at [employer]  She has lost about 14 pounds and feels much more energized  Currently denies any fever, chills, fatigue, new visual changes, lightheadedness, syncope, chest pain, palpitations, shortness of breath at rest or with exertion, orthopnea, PND, nausea, vomiting, diarrhea, dark or bright red blood in stools, lower extremity swelling, bruising, bleeding  Social history: Never smoker  Social alcohol use  Family history: Father and his side have high cholesterol  Paternal GF  of MI age around 79  Maternal GM  of CAD and MI at 54 - heavy smoker      The following portions of the patient's history were reviewed and updated as appropriate: allergies, current medications, past family history, past medical history, past social history, past surgical history, and problem list     SINCE LAST OV I REVIEWED WITH THE PATIENT THE INTERIM LABS, TEST RESULTS, CONSULTANT(S) NOTES AND PERFORMED AN INTERIM REVIEW OF HISTORY    Past Medical History:   Diagnosis Date   • Abnormal Pap smear of cervix    • Allergic rhinitis    • Bartholin cyst    • GERD (gastroesophageal reflux disease)    • HPV in female    • Obesity    • ERIKA (obstructive sleep apnea)    • PCOS (polycystic ovarian syndrome) 11/2019   • Polycystic ovary syndrome    • Varicella        Past Surgical History:   Procedure Laterality Date   • EXAMINATION UNDER ANESTHESIA N/A 9/18/2020    Procedure: EXAM UNDER ANESTHESIA (EUA);   Surgeon: Berenice Wade MD;  [facility];  Service: Gynecology   • [place] MARSUPIALIZATION Alter Wall 79 GLAND CYST N/A 9/18/2020    Procedure: Leanord Mad CYST;  Surgeon: Berenice Wade MD;  [facility];  Service: Gynecology   • WISDOM TOOTH EXTRACTION         Social History     Socioeconomic History   • Marital status: /Civil Union     Spouse name: Not on file   • Number of children: Not on file   • Years of education: Not on file   • Highest education level: Not on file   Occupational History   • Not on file   Tobacco Use   • Smoking status: Never   • Smokeless tobacco: Never   Vaping Use   • Vaping Use: Never used   Substance and Sexual Activity   • Alcohol use: Yes     Comment: social   • Drug use: Never   • Sexual activity: Yes     Partners: Male     Birth control/protection: OCP   Other Topics Concern   • Not on file   Social History Narrative   • Not on file     Social Determinants of Health     Financial Resource Strain: Not on file   Food Insecurity: Not on file   Transportation Needs: Not on file   Physical Activity: Not on file   Stress: Not on file   Social Connections: Not on file   Intimate Partner Violence: Not on file   Housing Stability: Not on file       Family History   Problem Relation Age of Onset   • Drug abuse Mother         suicide 2011   • Hypertension Father    • Diabetes Father    • Asthma Father    • Gestational diabetes Sister         twin   • Asthma Sister    • No Known Problems Sister         drug addiction   • No Known Problems Sister    • No Known Problems Brother    • No Known Problems Brother    • Diabetes Maternal Grandmother    • Heart failure "Maternal Grandmother    • Diabetes Paternal Grandmother    • Diabetes Paternal Grandfather    • Heart failure Paternal Grandfather    • Coronary artery disease Family    • Diabetes Family    • Colon cancer Neg Hx    • Ovarian cancer Neg Hx    • Breast cancer Neg Hx        Allergies   Allergen Reactions   • Shrimp Extract Allergy Skin Test - Food Allergy Hives   • Other Other (See Comments)     Environmental       Current Outpatient Medications   Medication Sig Dispense Refill   • Ascorbic Acid (vitamin C) 1000 MG tablet Take 1,000 mg by mouth daily     • b complex vitamins capsule Take 1 capsule by mouth daily     • cetirizine (ZyrTEC) 10 mg tablet Take 10 mg by mouth daily as needed      • fluticasone (FLONASE) 50 mcg/act nasal spray 1 spray into each nostril daily 16 mL 4   • MAGNESIUM PO Take 1 tablet by mouth in the morning     • meclizine (ANTIVERT) 25 mg tablet Take 1 tablet (25 mg total) by mouth 3 (three) times a day as needed for dizziness 30 tablet 0   • metFORMIN (GLUCOPHAGE-XR) 500 mg 24 hr tablet Take 1 tablet (500 mg total) by mouth 2 (two) times a day with meals 180 tablet 3   • Multiple Vitamins-Minerals (MULTIVITAL-M PO) Take by mouth daily     • norethindrone-ethinyl estradiol (Loestrin Fe 1/20) 1-20 MG-MCG per tablet Take 1 tablet by mouth daily 84 tablet 3   • Omega-3 Fatty Acids (FISH OIL PO) Take 2 tablets by mouth in the morning     • omeprazole (PriLOSEC) 40 MG capsule TAKE 1 CAPSULE (40 MG TOTAL) BY MOUTH DAILY  90 capsule 3     No current facility-administered medications for this visit  /82 (BP Location: Left arm, Patient Position: Sitting)   Pulse 74   Ht 5' 6\" (1 676 m)   Wt 109 kg (240 lb)   LMP 03/09/2023 (Exact Date)   BMI 38 74 kg/m²     Review of Systems   All other systems reviewed and are negative  Physical Exam  Vitals reviewed  Constitutional:       General: She is not in acute distress  Appearance: She is well-developed  She is obese   She is not " toxic-appearing  HENT:      Head: Normocephalic and atraumatic  Eyes:      General: No scleral icterus  Conjunctiva/sclera: Conjunctivae normal    Neck:      Vascular: No carotid bruit  Cardiovascular:      Rate and Rhythm: Normal rate and regular rhythm  Pulses: Normal pulses  Heart sounds: Normal heart sounds  No murmur heard  No gallop  Pulmonary:      Effort: Pulmonary effort is normal  No respiratory distress  Breath sounds: Normal breath sounds  No wheezing or rales  Abdominal:      General: Abdomen is flat  Bowel sounds are normal  There is no distension  Palpations: Abdomen is soft  Tenderness: There is no abdominal tenderness  Musculoskeletal:         General: No swelling  Right lower leg: No edema  Left lower leg: No edema  Skin:     General: Skin is warm and dry  Capillary Refill: Capillary refill takes less than 2 seconds  Coloration: Skin is not jaundiced or pale  Neurological:      Mental Status: She is alert     Psychiatric:         Mood and Affect: Mood normal           Lab Results   Component Value Date    K 4 2 04/04/2023     04/04/2023    CO2 27 04/04/2023    BUN 12 04/04/2023    CREATININE 0 76 04/04/2023    CALCIUM 9 6 04/04/2023    ALT 30 04/04/2023    AST 18 04/04/2023    INR 1 11 09/07/2014       Lab Results   Component Value Date    HDL 52 06/13/2022    LDLCALC 151 (H) 06/13/2022    TRIG 153 (H) 06/13/2022       Lab Results   Component Value Date    WBC 6 14 04/04/2023    HGB 14 2 04/04/2023    HCT 43 2 04/04/2023     04/04/2023       Lab Results   Component Value Date     06/13/2022    HGBA1C 5 4 06/13/2022     Cardiac studies:  Results for orders placed or performed in visit on 04/07/23   POCT ECG    Impression    Normal sinus rhythm  Nonspecific T wave abnormality  Low voltage QRS  No prior ECG available for comparison        ASSESSMENT AND PLAN:  Teri Cherry was seen today for new patient visit     Diagnoses and all orders for this visit:  #  Pre-operative cardiovascular examination  #  Morbid obesity ([facility] [address] )  -     POCT ECG  #  Suspected sleep apnea  #  Mixed hyperlipidemia: Anticipate this will improve with weight loss  61-year-old female with the above-mentioned past medical history who presents today for preoperative risk assessment for gastric sleeve surgery  Her surgical date is to be determined but sometime in June/July 2023  ECG reveals normal sinus rhythm, nonspecific T wave abnormality, low voltage QRS  No designated exercise regimen but she walks approximately 6k steps per day without any exertional symptoms    No signs/symptoms of congestive heart failure or severe valvular disease on exam     Summary of recs:  -Low-risk for this intermediate-risk procedure  -No cardiac contraindications to proceeding with the above proposed procedure  -No additional cardiac work up is recommended at this time prior to the above procedure  -Pending sleep study results - recommend treatment of ERIKA as advised by Pulmonology  -Surgery is 2+ months away and she should return to the office for additional assessment prior to surgery IF there is development of any new cardiac or pulmonary symptoms    Surendra Mcgarry MD

## 2023-04-07 NOTE — PATIENT INSTRUCTIONS
You were seen today in the Cardiology office for pre operative risk assessment  There is no additional cardiac work up recommend at this time prior to bariatric surgery  Please return to the office for re evaluation prior to surgery if you develop any new symptoms  Thank you for choosing [address]  Please call our office or use [employer] with any questions

## 2023-04-27 DIAGNOSIS — R42 DIZZINESS: ICD-10-CM

## 2023-04-27 RX ORDER — MECLIZINE HYDROCHLORIDE 25 MG/1
25 TABLET ORAL 3 TIMES DAILY PRN
Qty: 30 TABLET | Refills: 0 | Status: SHIPPED | OUTPATIENT
Start: 2023-04-27

## 2023-04-28 ENCOUNTER — APPOINTMENT (OUTPATIENT)
Dept: RADIOLOGY | Age: 33
End: 2023-04-28

## 2023-04-28 ENCOUNTER — OFFICE VISIT (OUTPATIENT)
Dept: URGENT CARE | Age: 33
End: 2023-04-28

## 2023-04-28 ENCOUNTER — PROCEDURE VISIT (OUTPATIENT)
Dept: OBGYN CLINIC | Facility: CLINIC | Age: 33
End: 2023-04-28

## 2023-04-28 VITALS
HEART RATE: 80 BPM | RESPIRATION RATE: 18 BRPM | SYSTOLIC BLOOD PRESSURE: 140 MMHG | WEIGHT: 239 LBS | BODY MASS INDEX: 38.58 KG/M2 | OXYGEN SATURATION: 99 % | DIASTOLIC BLOOD PRESSURE: 72 MMHG | TEMPERATURE: 98 F

## 2023-04-28 VITALS — SYSTOLIC BLOOD PRESSURE: 110 MMHG | DIASTOLIC BLOOD PRESSURE: 64 MMHG | WEIGHT: 239 LBS | BODY MASS INDEX: 38.58 KG/M2

## 2023-04-28 DIAGNOSIS — Z30.017 NEXPLANON INSERTION: Primary | ICD-10-CM

## 2023-04-28 DIAGNOSIS — R06.02 SHORTNESS OF BREATH: ICD-10-CM

## 2023-04-28 DIAGNOSIS — R06.02 SHORTNESS OF BREATH: Primary | ICD-10-CM

## 2023-04-28 RX ORDER — ALBUTEROL SULFATE 90 UG/1
2 AEROSOL, METERED RESPIRATORY (INHALATION) EVERY 6 HOURS PRN
Qty: 8.5 G | Refills: 1 | Status: SHIPPED | OUTPATIENT
Start: 2023-04-28

## 2023-04-28 RX ORDER — IPRATROPIUM BROMIDE AND ALBUTEROL SULFATE 2.5; .5 MG/3ML; MG/3ML
3 SOLUTION RESPIRATORY (INHALATION) ONCE
Status: COMPLETED | OUTPATIENT
Start: 2023-04-28 | End: 2023-04-28

## 2023-04-28 RX ADMIN — IPRATROPIUM BROMIDE AND ALBUTEROL SULFATE 3 ML: 2.5; .5 SOLUTION RESPIRATORY (INHALATION) at 19:05

## 2023-04-28 NOTE — PROGRESS NOTES
[id number]        NAME: Carlitos Corley is a 29 y o  female  : 1990    MRN: [id number]  DATE: 2023  TIME: 7:20 PM    Assessment and Plan   Shortness of breath [R06 02]  1  Shortness of breath  XR chest pa & lateral    ipratropium-albuterol (DUO-NEB) 0 5-2 5 mg/3 mL inhalation solution 3 mL    albuterol (ProAir HFA) 90 mcg/act inhaler        Chest x-ray reviewed, no acute cardiopulmonary disease identified, awaiting final read per radiology  Patient given DuoNeb treatment in office, she tolerated treatment well and verbalizes feeling better posttreatment  Discussed with patient trialing albuterol inhaler every 6 hours as needed for chest tightness and shortness of breath  She is agreeable to this plan of care, all question concerns were addressed during this visit  Patient Instructions       Please use albuterol inhaler every 6 hours as needed for chest tightness and shortness of breath  If symptoms persist, please follow-up with your primary care provider  If symptoms worsen, or you develop new or concerning symptoms, please go to the ER  Chief Complaint     Chief Complaint   Patient presents with   • Cough   • Shortness of Breath     Shortness of breath , cough and chest tightness started last night and it's getting worse  History of Present Illness       Presenting for evaluation of shortness of breath  Patient states at 6 PM last night she has been having difficulty taking a deep breath  She states that her shortness of breath is causing her to have mild dizziness  She states that she is having a mild dry cough, but denies any chest pain, nausea, vomiting of diarrhea, fevers or chills  She states that she took a home COVID test with negative test results  Patient denies any history of restrictive airway disease, and denies any previous episodes of shortness of breath  Review of Systems   Review of Systems   Constitutional: Negative for chills and fever  HENT: Negative for congestion and sore throat  Respiratory: Positive for cough and shortness of breath  Cardiovascular: Negative for chest pain  Gastrointestinal: Negative for diarrhea, nausea and vomiting  Neurological: Positive for dizziness  All other systems reviewed and are negative  Current Medications       Current Outpatient Medications:   •  albuterol (ProAir HFA) 90 mcg/act inhaler, Inhale 2 puffs every 6 (six) hours as needed for wheezing or shortness of breath, Disp: 8 5 g, Rfl: 1  •  Ascorbic Acid (vitamin C) 1000 MG tablet, Take 1,000 mg by mouth daily, Disp: , Rfl:   •  b complex vitamins capsule, Take 1 capsule by mouth daily, Disp: , Rfl:   •  cetirizine (ZyrTEC) 10 mg tablet, Take 10 mg by mouth daily as needed , Disp: , Rfl:   •  fluticasone (FLONASE) 50 mcg/act nasal spray, 1 spray into each nostril daily, Disp: 16 mL, Rfl: 4  •  MAGNESIUM PO, Take 1 tablet by mouth in the morning, Disp: , Rfl:   •  meclizine (ANTIVERT) 25 mg tablet, Take 1 tablet (25 mg total) by mouth 3 (three) times a day as needed for dizziness, Disp: 30 tablet, Rfl: 0  •  metFORMIN (GLUCOPHAGE-XR) 500 mg 24 hr tablet, Take 1 tablet (500 mg total) by mouth 2 (two) times a day with meals, Disp: 180 tablet, Rfl: 3  •  Multiple Vitamins-Minerals (MULTIVITAL-M PO), Take by mouth daily, Disp: , Rfl:   •  Omega-3 Fatty Acids (FISH OIL PO), Take 2 tablets by mouth in the morning, Disp: , Rfl:   •  omeprazole (PriLOSEC) 40 MG capsule, TAKE 1 CAPSULE (40 MG TOTAL) BY MOUTH DAILY  , Disp: 90 capsule, Rfl: 3  No current facility-administered medications for this visit      Current Allergies     Allergies as of 04/28/2023 - Reviewed 04/28/2023   Allergen Reaction Noted   • Shrimp extract allergy skin test - food allergy Hives 06/12/2019   • Other Other (See Comments) 09/01/2021            The following portions of the patient's history were reviewed and updated as appropriate: allergies, current medications, past family history, past medical history, past social history, past surgical history and problem list      Past Medical History:   Diagnosis Date   • Abnormal Pap smear of cervix    • Allergic rhinitis    • Bartholin cyst    • GERD (gastroesophageal reflux disease)    • HPV in female    • Obesity    • ERIKA (obstructive sleep apnea)    • PCOS (polycystic ovarian syndrome) 11/2019   • Polycystic ovary syndrome    • Varicella        Past Surgical History:   Procedure Laterality Date   • EXAMINATION UNDER ANESTHESIA N/A 9/18/2020    Procedure: EXAM UNDER ANESTHESIA (EUA); Surgeon: Barber Rodrigues MD;  [facility];  Service: Gynecology   • IN MARSUPIALIZATION Alter Wall 79 GLAND CYST N/A 9/18/2020    Procedure: Mando Fear CYST;  Surgeon: Barber Rodrigues MD;  [facility];  Service: Gynecology   • WISDOM TOOTH EXTRACTION         Family History   Problem Relation Age of Onset   • Drug abuse Mother         suicide 2011   • Hypertension Father    • Diabetes Father    • Asthma Father    • Gestational diabetes Sister         twin   • Asthma Sister    • No Known Problems Sister         drug addiction   • No Known Problems Sister    • No Known Problems Brother    • No Known Problems Brother    • Diabetes Maternal Grandmother    • Heart failure Maternal Grandmother    • Diabetes Paternal Grandmother    • Diabetes Paternal Grandfather    • Heart failure Paternal Grandfather    • Coronary artery disease Family    • Diabetes Family    • Colon cancer Neg Hx    • Ovarian cancer Neg Hx    • Breast cancer Neg Hx          Medications have been verified  Objective   /72   Pulse 80 Comment: Posttreatment  Temp 98 °F (36 7 °C) (Temporal)   Resp 18 Comment: Posttreatment  Wt 108 kg (239 lb)   LMP 04/03/2023 (Exact Date)   SpO2 99% Comment: Posttreatment  BMI 38 58 kg/m²        Physical Exam     Physical Exam  Vitals and nursing note reviewed     Constitutional:       General: She is not in acute distress  Appearance: Normal appearance  She is not ill-appearing, toxic-appearing or diaphoretic  HENT:      Head: Normocephalic and atraumatic  Mouth/Throat:      Mouth: Mucous membranes are moist       Pharynx: Oropharynx is clear  No oropharyngeal exudate or posterior oropharyngeal erythema  Eyes:      General:         Right eye: No discharge  Left eye: No discharge  Cardiovascular:      Rate and Rhythm: Normal rate and regular rhythm  Pulses: Normal pulses  Heart sounds: Normal heart sounds  No murmur heard  No friction rub  No gallop  Pulmonary:      Effort: Pulmonary effort is normal  No respiratory distress  Breath sounds: No stridor  Examination of the right-upper field reveals wheezing  Examination of the left-upper field reveals wheezing  Examination of the right-middle field reveals wheezing  Examination of the left-middle field reveals wheezing  Examination of the right-lower field reveals wheezing  Examination of the left-lower field reveals wheezing  Wheezing present  No rhonchi or rales  Chest:      Chest wall: No tenderness  Abdominal:      General: Bowel sounds are normal       Palpations: Abdomen is soft  Tenderness: There is no abdominal tenderness  Skin:     General: Skin is warm and dry  Neurological:      Mental Status: She is alert     Psychiatric:         Mood and Affect: Mood normal          Behavior: Behavior normal

## 2023-04-28 NOTE — PROGRESS NOTES
Universal Protocol:  Consent: Verbal consent obtained  Risks and benefits: risks, benefits and alternatives were discussed  Consent given by: patient  Patient understanding: patient states understanding of the procedure being performed  Patient identity confirmed: verbally with patient    Remove and insert drug implant    Date/Time: 4/28/2023 10:00 AM  Performed by: Windy Sanchez MD  Authorized by: Windy Sanchez MD     Indication:     Indication: Insertion of non-biodegradable drug delivery implant    Pre-procedure:     Prepped with: alcohol 70% and povidone-iodine      Local anesthetic:  Lidocaine 1%    The site was cleaned and prepped in a sterile fashion: yes    Procedure:     Procedure:   Insertion    Small stab incision was made in arm: no      Left/right:  Left    Preloaded contraceptive capsule trocar was placed subdermally: yes      Visualization of implant was obtained: yes      Contraceptive capsule was inserted and trocar removed: yes      Visualization of notch in stylet and palpation of device: yes      Palpation confirms placement by provider and patient: yes      Site was closed with steri-strips and pressure bandage applied: yes

## 2023-04-28 NOTE — PATIENT INSTRUCTIONS
Please use albuterol inhaler every 6 hours as needed for chest tightness and shortness of breath  If symptoms persist, please follow-up with your primary care provider  If symptoms worsen, or you develop new or concerning symptoms, please go to the ER

## 2023-05-04 NOTE — PROGRESS NOTES
Behavioral Health Follow Up Note:      4 / 4  Weight Check:  Surgeon: Dr Sinha Fresh    Starting weight 255 5 #  Today's weight 237 #  Surgery goal 243 #    Surgery month:  June/ July  Surgery deadline: October    Mental health and wellness -  Tracking food with [employer]  Averaging 100 protein grams a day  Feels the changes she has made to date are sustainable  Good support in the household   is primary support  Family and co workers and friends are all supportive of her changes  Everyone is comfortable with the changes she has made  History of Rx for weight loss with minimal success  Eating behaviors - chewing food and trying to slow down  Plating her food (in order)  Feels she is comfortable leaving food on her plate  Some carbonated beverages, trying to reduce  Notices the air intake with her straw use  Activity -  A lot of walking during her normal routine at work    Nicer weather she will walk with intent  Can improve her movement  Progress toward program requirements    Workflow:  · Psych and/or D+A Clearance: n/a  · PCP Letter: 1/17/2023  · Support Group: 2/8/2023  · Surgeon Appt  : 2/8/2023  · EGD : 2/20/2023  · Cardiac Risk Assessment: 4/7/2023  · Sleep Studies: n/a  · Bloodwork: done

## 2023-05-05 ENCOUNTER — OFFICE VISIT (OUTPATIENT)
Dept: BARIATRICS | Facility: CLINIC | Age: 33
End: 2023-05-05

## 2023-05-05 VITALS — WEIGHT: 237 LBS | BODY MASS INDEX: 38.25 KG/M2

## 2023-05-05 DIAGNOSIS — E66.01 MORBID OBESITY (HCC): Primary | ICD-10-CM

## 2023-05-07 DIAGNOSIS — K21.9 GASTROESOPHAGEAL REFLUX DISEASE, UNSPECIFIED WHETHER ESOPHAGITIS PRESENT: ICD-10-CM

## 2023-05-08 RX ORDER — OMEPRAZOLE 40 MG/1
40 CAPSULE, DELAYED RELEASE ORAL DAILY
Qty: 90 CAPSULE | Refills: 0 | Status: SHIPPED | OUTPATIENT
Start: 2023-05-08

## 2023-05-11 ENCOUNTER — OFFICE VISIT (OUTPATIENT)
Dept: BARIATRICS | Facility: CLINIC | Age: 33
End: 2023-05-11

## 2023-05-11 VITALS
BODY MASS INDEX: 37.28 KG/M2 | WEIGHT: 237.5 LBS | TEMPERATURE: 98.7 F | DIASTOLIC BLOOD PRESSURE: 84 MMHG | SYSTOLIC BLOOD PRESSURE: 122 MMHG | OXYGEN SATURATION: 98 % | HEART RATE: 84 BPM | HEIGHT: 67 IN

## 2023-05-11 DIAGNOSIS — E66.9 OBESITY, CLASS II, BMI 35-39.9: Primary | ICD-10-CM

## 2023-05-11 NOTE — PROGRESS NOTES
"OFFICE VISIT - BARIATRIC SURGERY  Raghavendra Carvajal 29 y o  female MRN: [id number]  Unit/Bed#:  Encounter: [id number]      HPI:  Omer Cody is a 29 y o  female who was found to be a good candidate for a weight loss procedure    Subjective    She is planning on having a Robotic Sleeve Gastrectomy    She has been doing well with the program and feeling well today  She has been meeting all of her requirements  She has no questions or concerns at this time  Review of Systems   Constitutional: Negative  Respiratory: Negative  Cardiovascular: Negative  Gastrointestinal: Negative  Historical Information   Past Medical History:   Diagnosis Date   • Abnormal Pap smear of cervix    • Allergic rhinitis    • Bartholin cyst    • GERD (gastroesophageal reflux disease)    • HPV in female    • Obesity    • ERIKA (obstructive sleep apnea)    • PCOS (polycystic ovarian syndrome) 11/2019   • Polycystic ovary syndrome    • Varicella      Past Surgical History:   Procedure Laterality Date   • EXAMINATION UNDER ANESTHESIA N/A 9/18/2020    Procedure: EXAM UNDER ANESTHESIA (EUA);   Surgeon: Earl Valle MD;  [facility];  Service: Gynecology   • [place] MARSUPIALIZATION BARTHOLINS GLAND CYST N/A 9/18/2020    Procedure: Leata Bonds CYST;  Surgeon: Earl Valle MD;  [facility];  Service: Gynecology   • WISDOM TOOTH EXTRACTION       Social History   Social History     Substance and Sexual Activity   Alcohol Use Yes    Comment: social     Social History     Substance and Sexual Activity   Drug Use Never     Social History     Tobacco Use   Smoking Status Never   Smokeless Tobacco Never       Objective       Current Vitals:   Blood Pressure: 122/84 (05/11/23 1050)  Pulse: 84 (05/11/23 1050)  Temperature: 98 7 °F (37 1 °C) (05/11/23 1050)  Temp Source: Tympanic (05/11/23 1050)  Height: 5' 6 5\" (168 9 cm) (05/11/23 1050)  Weight - Scale: 108 kg (237 lb 8 oz) (05/11/23 1050)  SpO2: " 98 % (05/11/23 1050)    Invasive Devices     None                 Physical Exam  Constitutional:       General: She is not in acute distress  Appearance: She is obese  She is not toxic-appearing  Cardiovascular:      Rate and Rhythm: Normal rate and regular rhythm  Pulses: Normal pulses  Heart sounds: Normal heart sounds  Pulmonary:      Effort: Pulmonary effort is normal  No respiratory distress  Breath sounds: Normal breath sounds  No wheezing  Abdominal:      General: Abdomen is flat  There is no distension  Palpations: Abdomen is soft  Tenderness: There is no abdominal tenderness  Neurological:      Mental Status: She is alert  Pathology, and Other Studies: I have personally reviewed pertinent reports  EGD  FINDINGS:  • Regular Z-line 35 cm from the incisors  • The esophagus, stomach and 2nd part of the duodenum appeared normal   • Performed forceps biopsies  • Small sliding hiatal hernia (type I hiatal hernia) - GE junction 35 cm from the incisors, diaphragmatic impression 36 cm from the incisors  • Ten or more fundic gland polyps measuring smaller than 5 mm in the stomach        SPECIMENS:  ID Type Source Tests Collected by Time Destination   1 : r/o h pylori Tissue Stomach TISSUE EXAM Farhad Parsons MD 2/20/2023 1343     2 : polyp Tissue Stomach TISSUE EXAM Farhad Parsons MD 2/20/2023 1344              IMPRESSION:  Small hiatal hernia  Fundic type polyps    Pathology from EGD   Lab Results   Component Value Date    FINALDX  02/20/2023     A  Stomach, biopsy:     - Chronic inactive antral gastritis  - No H  pylori organisms identified on H&E stained sections      - No intestinal metaplasia, dysplasia or neoplasia identified  B   Stomach, polyp:     - Fundic gland polyp      - No intestinal metaplasia, dysplasia or neoplasia identified                 Assessment/PLAN:    Annamaria Hogue is a 29 y o  female found to be a good cadidate for a Robotic Sleeve Gastrectomy  --------------------------------------------------------------------    We will proceed with scheduling for a Robotic Sleeve Gastrectomy  We will refer for medical clearance preoperatively  Follow up as directed              John Tran DO  Bariatric Surgery Fellow  [facility]  5/11/2023  11:02 AM

## 2023-05-18 ENCOUNTER — PREP FOR PROCEDURE (OUTPATIENT)
Dept: BARIATRICS | Facility: CLINIC | Age: 33
End: 2023-05-18

## 2023-05-18 DIAGNOSIS — K21.9 GASTROESOPHAGEAL REFLUX DISEASE, UNSPECIFIED WHETHER ESOPHAGITIS PRESENT: ICD-10-CM

## 2023-05-18 DIAGNOSIS — E28.2 POLYCYSTIC OVARIES: ICD-10-CM

## 2023-05-18 DIAGNOSIS — E66.9 OBESITY, UNSPECIFIED: Primary | ICD-10-CM

## 2023-06-22 ENCOUNTER — CLINICAL SUPPORT (OUTPATIENT)
Dept: BARIATRICS | Facility: CLINIC | Age: 33
End: 2023-06-22

## 2023-06-22 ENCOUNTER — OFFICE VISIT (OUTPATIENT)
Dept: BARIATRICS | Facility: CLINIC | Age: 33
End: 2023-06-22
Payer: COMMERCIAL

## 2023-06-22 VITALS
WEIGHT: 238 LBS | DIASTOLIC BLOOD PRESSURE: 80 MMHG | SYSTOLIC BLOOD PRESSURE: 120 MMHG | HEART RATE: 82 BPM | TEMPERATURE: 98.1 F | OXYGEN SATURATION: 99 % | HEIGHT: 67 IN | BODY MASS INDEX: 37.35 KG/M2

## 2023-06-22 DIAGNOSIS — E66.9 OBESITY, CLASS II, BMI 35-39.9: Primary | ICD-10-CM

## 2023-06-22 PROBLEM — R29.818 SUSPECTED SLEEP APNEA: Status: RESOLVED | Noted: 2023-03-13 | Resolved: 2023-06-22

## 2023-06-22 PROCEDURE — 99213 OFFICE O/P EST LOW 20 MIN: CPT | Performed by: SURGERY

## 2023-06-22 PROCEDURE — RECHECK: Performed by: DIETITIAN, REGISTERED

## 2023-06-22 RX ORDER — CEFAZOLIN SODIUM 2 G/50ML
2000 SOLUTION INTRAVENOUS ONCE
OUTPATIENT
Start: 2023-06-22 | End: 2023-06-22

## 2023-06-22 RX ORDER — SCOLOPAMINE TRANSDERMAL SYSTEM 1 MG/1
1 PATCH, EXTENDED RELEASE TRANSDERMAL ONCE
OUTPATIENT
Start: 2023-06-22 | End: 2023-06-22

## 2023-06-22 RX ORDER — ENOXAPARIN SODIUM 100 MG/ML
40 INJECTION SUBCUTANEOUS
OUTPATIENT
Start: 2023-06-23 | End: 2023-06-24

## 2023-06-22 RX ORDER — CELECOXIB 200 MG/1
200 CAPSULE ORAL ONCE
OUTPATIENT
Start: 2023-06-22 | End: 2023-06-22

## 2023-06-22 RX ORDER — ACETAMINOPHEN 325 MG/1
975 TABLET ORAL ONCE
OUTPATIENT
Start: 2023-06-22 | End: 2023-06-22

## 2023-06-22 NOTE — PROGRESS NOTES
BARIATRIC HISTORY AND PHYSICAL - BARIATRIC SURGERY  Bernie Carvajal 29 y o  female MRN: [id number]  Unit/Bed#:  Encounter: [id number]      HPI:  Jose Miguel Potts is a 29 y o  female who presents to review their preoperative workup and see if they are a good candidate to undergo a bariatric procedure  Review of Systems  Denies fevers, chills, n/v/d, chest pain, SOB, headaches, blurred vision, and dizziness    Historical Information   Past Medical History:   Diagnosis Date   • Abnormal Pap smear of cervix    • Allergic rhinitis    • Bartholin cyst    • GERD (gastroesophageal reflux disease)    • HPV in female    • Obesity    • ERIKA (obstructive sleep apnea)    • PCOS (polycystic ovarian syndrome) 11/2019   • Polycystic ovary syndrome    • Varicella      Past Surgical History:   Procedure Laterality Date   • EXAMINATION UNDER ANESTHESIA N/A 9/18/2020    Procedure: EXAM UNDER ANESTHESIA (EUA);   Surgeon: Cristian Toribio MD;  [facility];  Service: Gynecology   • [place] MARSUPIALIZATION Alter Wall 79 GLAND CYST N/A 9/18/2020    Procedure: Nguyễn Ricks CYST;  Surgeon: Cristian Toribio MD;  [facility];  Service: Gynecology   • WISDOM TOOTH EXTRACTION       Social History   Social History     Substance and Sexual Activity   Alcohol Use Yes   • Alcohol/week: 1 0 standard drink of alcohol   • Types: 1 Glasses of wine per week    Comment: social     Social History     Substance and Sexual Activity   Drug Use Never     Social History     Tobacco Use   Smoking Status Never   Smokeless Tobacco Never     Family History:   Family History   Problem Relation Age of Onset   • Drug abuse Mother         suicide 2011   • Alcohol abuse Mother    • Depression Mother    • Early death Mother    • Mental illness Mother    • Substance Abuse Mother    • Hypertension Father    • Diabetes Father    • Asthma Father    • Gestational diabetes Sister         twin   • Asthma Sister    • Mental illness Sister    • "Substance Abuse Sister    • No Known Problems Sister    • No Known Problems Brother    • No Known Problems Brother    • Diabetes Maternal Grandmother    • Heart failure Maternal Grandmother    • Diabetes Paternal Grandmother    • Diabetes Paternal Grandfather    • Heart failure Paternal Grandfather    • Coronary artery disease Family    • Diabetes Family    • Colon cancer Neg Hx    • Ovarian cancer Neg Hx    • Breast cancer Neg Hx        Meds/Allergies   all medications and allergies reviewed  Allergies   Allergen Reactions   • Shrimp Extract Allergy Skin Test - Food Allergy Hives   • Other Other (See Comments)     Environmental       Objective     Current Vitals:   Blood Pressure: 120/80 (06/22/23 1424)  Pulse: 82 (06/22/23 1424)  Temperature: 98 1 °F (36 7 °C) (06/22/23 1424)  Temp Source: Tympanic (06/22/23 1424)  Height: 5' 6 5\" (168 9 cm) (06/22/23 1424)  Weight - Scale: 108 kg (238 lb) (06/22/23 1424)  SpO2: 99 % (06/22/23 1424)  bowel movement  [unfilled]    Invasive Devices     None                 Physical Exam  Awake, alert, oriented, no distress  Normal work of breathing, lungs clear to auscultation  Regular rate and rhythm  Abd obese, non tender, non distended, no masses or defects  Moves all extremities  Normal strength and ROM  No edema  Normal affect    Lab Results: I have personally reviewed pertinent lab results  Imaging: I have personally reviewed pertinent reports  EKG, Pathology, and Other Studies: I have personally reviewed pertinent reports  Code Status: [unfilled]  Advance Directive and Living Will:      Power of :    POLST:        Assessment/Plan:    The patient presented to review the preoperative workup and see if bariatric surgery is appropriate and indicated following the extensive preoperative workup and the enrollment in our weight loss program   Preoperative workup was complete   Results were reviewed with the patient including the blood work results and the " endoscopy findings and the biopsy results  We also reviewed the cardiology evaluation  The patient was determined to be a good candidate for robotic assisted lap sleeve gastrectomy  ----------------------------------------------------------------------  Smoking: no  Blood thinner use: no  Home pain medication use and who manages it: no  CPAP use: no (If yes, sleep study obtained and reminded pt to bring CPAP to hospital)  History of blood clots: no  Previous abdominal surgeries: no  Cardiac clearance obtained: dr Jasvir Milligan 4/7/23   EKG performed: NSR 4/7/23  EGD prior to surgery: 2/20/23  Screening Labs obtained (CBC, CMP): 4/4/23  H  Pylori status: neg 2/20/23  Medication allergies: n/a  SLIM consult Post-Op: n/a  Reminded patient about 2 week pre-op liquid diet: yes  10% body weight loss pre-operatively met/discussed: n/a  Consent signed: yes  Pre-Op ERAS Orders placed: yes  -----------------------------------------------------------------------  Risks and benefits explained one more time to the patient  Alternatives to surgery and alternative forms of surgery were also explained  Post-surgical commitment and after care programs were explained  We also discussed that the [employer] robot may be available to us to use on the day of the patient procedure and that the procedure may be performed robotically  I discussed in details the advantages and disadvantages of this approach including the potential decrease in postoperative pain because of the remote center technology  I also mentioned the lack of strong evidence for the use of robot in bariatric patients and the potential disadvantage to patients because of the prolonged operative time  Consent was signed  Questions were answered and concerns were addressed  Patient will need to start the 2 week liquid diet prior to surgery  Patient wishes to proceed   As per [address] [place] [address] guidelines, I had a discussion with the patient regarding their CODE STATUS in the perioperative period and the patient is level 1 or FULL CODE STATUS      Dat Brown MD  Bariatric Surgery Fellow     6/22/2023  2:32 PM

## 2023-06-22 NOTE — PROGRESS NOTES
Pt attended pre-op education session  Standardized packet of information for bariatric surgery was provided and was reviewed with pt  Importance of lifestyle change and development of regular exercise routine stressed  Pt given the opportunity to ask questions  Ensure pre-surgery drink instructions were given  Questions were answered  Pt verbalized understanding of all information provided  Pt appeared prepared for upcoming surgery  Pt  educated on two-week pre operative liver shrinking diet  Pt understands that the diet needs to be followed for 2 weeks prior to surgery  Handout reviewed  Emphasized the need to drink 80 ounces of fluid per day while on the diet  Reviewed pre-op ERAS drink, post-operative clear liquid, full liquid, and pureed diet, post-operative nutrition rules and facts, and post-operative bariatric multivitamin/mineral recommendations and brand comparison  Contact information provided for any questions/concerns  Patient was able to verbalize basic diet (protein, fluid, vitamin and mineral) recommendations and possible nutrition-related complications   Yes

## 2023-06-22 NOTE — H&P (VIEW-ONLY)
BARIATRIC HISTORY AND PHYSICAL - BARIATRIC SURGERY  Haider Carvajal 29 y.o. female MRN: [id number]  Unit/Bed#:  Encounter: [id number]      HPI:  Renato Shi is a 29 y.o. female who presents to review their preoperative workup and see if they are a good candidate to undergo a bariatric procedure. Review of Systems  Denies fevers, chills, n/v/d, chest pain, SOB, headaches, blurred vision, and dizziness    Historical Information   Past Medical History:   Diagnosis Date   • Abnormal Pap smear of cervix    • Allergic rhinitis    • Bartholin cyst    • GERD (gastroesophageal reflux disease)    • HPV in female    • Obesity    • ERIKA (obstructive sleep apnea)    • PCOS (polycystic ovarian syndrome) 11/2019   • Polycystic ovary syndrome    • Varicella      Past Surgical History:   Procedure Laterality Date   • EXAMINATION UNDER ANESTHESIA N/A 9/18/2020    Procedure: EXAM UNDER ANESTHESIA (EUA);   Surgeon: Fernandez Santamaria MD;  Location: [facility];  Service: Gynecology   • CT MARSUPIALIZATION [address] [facility] GLAND CYST N/A 9/18/2020    Procedure: Tash Kobus CYST;  Surgeon: Fernandez Santamaria MD;  Location: [facility];  Service: Gynecology   • WISDOM TOOTH EXTRACTION       Social History   Social History     Substance and Sexual Activity   Alcohol Use Yes   • Alcohol/week: 1.0 standard drink of alcohol   • Types: 1 Glasses of wine per week    Comment: social     Social History     Substance and Sexual Activity   Drug Use Never     Social History     Tobacco Use   Smoking Status Never   Smokeless Tobacco Never     Family History:   Family History   Problem Relation Age of Onset   • Drug abuse Mother         suicide 2011   • Alcohol abuse Mother    • Depression Mother    • Early death Mother    • Mental illness Mother    • Substance Abuse Mother    • Hypertension Father    • Diabetes Father    • Asthma Father    • Gestational diabetes Sister         twin   • Asthma Sister    • Mental illness Sister    • Substance Abuse Sister    • No Known Problems Sister    • No Known Problems Brother    • No Known Problems Brother    • Diabetes Maternal Grandmother    • Heart failure Maternal Grandmother    • Diabetes Paternal Grandmother    • Diabetes Paternal Grandfather    • Heart failure Paternal Grandfather    • Coronary artery disease Family    • Diabetes Family    • Colon cancer Neg Hx    • Ovarian cancer Neg Hx    • Breast cancer Neg Hx        Meds/Allergies   all medications and allergies reviewed  Allergies   Allergen Reactions   • Shrimp Extract Allergy Skin Test - Food Allergy Hives   • Other Other (See Comments)     Environmental       Objective     Current Vitals:   Blood Pressure: 120/80 (06/22/23 1424)  Pulse: 82 (06/22/23 1424)  Temperature: 98.1 °F (36.7 °C) (06/22/23 1424)  Temp Source: Tympanic (06/22/23 1424)  Height: 5' 6.5" (168.9 cm) (06/22/23 1424)  Weight - Scale: 108 kg (238 lb) (06/22/23 1424)  SpO2: 99 % (06/22/23 1424)  bowel movement  [unfilled]    Invasive Devices     None                 Physical Exam  Awake, alert, oriented, no distress  Normal work of breathing, lungs clear to auscultation  Regular rate and rhythm  Abd obese, non tender, non distended, no masses or defects  Moves all extremities  Normal strength and ROM  No edema  Normal affect    Lab Results: I have personally reviewed pertinent lab results. Imaging: I have personally reviewed pertinent reports. EKG, Pathology, and Other Studies: I have personally reviewed pertinent reports. Code Status: [unfilled]  Advance Directive and Living Will:      Power of :    POLST:        Assessment/Plan:    The patient presented to review the preoperative workup and see if bariatric surgery is appropriate and indicated following the extensive preoperative workup and the enrollment in our weight loss program.  Preoperative workup was complete.  Results were reviewed with the patient including the blood work results and the endoscopy findings and the biopsy results. We also reviewed the cardiology evaluation. The patient was determined to be a good candidate for robotic assisted lap sleeve gastrectomy. ----------------------------------------------------------------------  Smoking: no  Blood thinner use: no  Home pain medication use and who manages it: no  CPAP use: no (If yes, sleep study obtained and reminded pt to bring CPAP to hospital)  History of blood clots: no  Previous abdominal surgeries: no  Cardiac clearance obtained: dr. Suzi Isbell 4/7/23   EKG performed: ODELL 4/7/23  EGD prior to surgery: 2/20/23  Screening Labs obtained (CBC, CMP): 4/4/23  H. Pylori status: neg 2/20/23  Medication allergies: n/a  SLIM consult Post-Op: n/a  Reminded patient about 2 week pre-op liquid diet: yes  10% body weight loss pre-operatively met/discussed: n/a  Consent signed: yes  Pre-Op ERAS Orders placed: yes  -----------------------------------------------------------------------  Risks and benefits explained one more time to the patient. Alternatives to surgery and alternative forms of surgery were also explained. Post-surgical commitment and after care programs were explained. We also discussed that the [employer] robot may be available to us to use on the day of the patient procedure and that the procedure may be performed robotically. I discussed in details the advantages and disadvantages of this approach including the potential decrease in postoperative pain because of the remote center technology. I also mentioned the lack of strong evidence for the use of robot in bariatric patients and the potential disadvantage to patients because of the prolonged operative time. Consent was signed. Questions were answered and concerns were addressed. Patient will need to start the 2 week liquid diet prior to surgery. Patient wishes to proceed.  As per [facility] guidelines, I had a discussion with the patient regarding their CODE STATUS in the perioperative period and the patient is level 1 or FULL CODE STATUS.     Harman Howard MD  Bariatric Surgery Fellow     6/22/2023  2:32 PM

## 2023-06-23 DIAGNOSIS — E66.9 OBESITY, CLASS II, BMI 35-39.9: Primary | ICD-10-CM

## 2023-06-23 NOTE — PRE-PROCEDURE INSTRUCTIONS
Pre-Surgery Instructions:   Medication Instructions   • albuterol (ProAir HFA) 90 mcg/act inhaler Uses PRN- OK to take day of surgery   • Ascorbic Acid (vitamin C) 1000 MG tablet Stop taking 7 days prior to surgery. • b complex vitamins capsule Stop taking 7 days prior to surgery. • cetirizine (ZyrTEC) 10 mg tablet Take day of surgery. • fluticasone (FLONASE) 50 mcg/act nasal spray Uses PRN- OK to take day of surgery   • MAGNESIUM PO Stop taking 7 days prior to surgery. • meclizine (ANTIVERT) 25 mg tablet Uses PRN- OK to take day of surgery   • metFORMIN (GLUCOPHAGE-XR) 500 mg 24 hr tablet Hold day of surgery. • Multiple Vitamins-Minerals (MULTIVITAL-M PO) Stop taking 7 days prior to surgery. • Omega-3 Fatty Acids (FISH OIL PO) Stop taking 7 days prior to surgery. • omeprazole (PriLOSEC) 40 MG capsule Take day of surgery. Medication instructions for day surgery reviewed. Please use only a sip of water to take your instructed medications. Avoid all over the counter vitamins, supplements and NSAIDS for one week prior to surgery per anesthesia guidelines. Tylenol is ok to take as needed. You will receive a call one business day prior to surgery with an arrival time and hospital directions. If your surgery is scheduled on a Monday, the hospital will be calling you on the Friday prior to your surgery. If you have not heard from anyone by 8pm, please call the hospital supervisor through the hospital  at [phone]. Eva Cleary [phone]). Do not eat or drink anything after midnight the night before your surgery, including candy, mints, lifesavers, or chewing gum. Do not drink alcohol 24hrs before your surgery. Try not to smoke at least 24hrs before your surgery. Follow the pre surgery showering instructions as listed in the [facility] Surgical Experience Booklet” or otherwise provided by your surgeon's office. Do not shave the surgical area 24 hours before surgery.  Do not apply any lotions, creams, including makeup, cologne, deodorant, or perfumes after showering on the day of your surgery. No contact lenses, eye make-up, or artificial eyelashes. Remove nail polish, including gel polish, and any artificial, gel, or acrylic nails if possible. Remove all jewelry including rings and body piercing jewelry. Wear causal clothing that is easy to take on and off. Consider your type of surgery. Keep any valuables, jewelry, piercings at home. Please bring any specially ordered equipment (sling, braces) if indicated. Arrange for a responsible person to drive you to and from the hospital on the day of your surgery. Visitor Guidelines discussed. Call the surgeon's office with any new illnesses, exposures, or additional questions prior to surgery. Please reference your [facility] Surgical Experience Booklet” for additional information to prepare for your upcoming surgery.

## 2023-06-23 NOTE — TELEPHONE ENCOUNTER
Po med Laparoscopic Sleeve Gastrectomy & Interoperative EGD Robotically Assisted    SX 7/11/2023 Lorene Martinez

## 2023-06-30 RX ORDER — OMEPRAZOLE 20 MG/1
20 CAPSULE, DELAYED RELEASE ORAL DAILY
Qty: 30 CAPSULE | Refills: 3 | Status: SHIPPED | OUTPATIENT
Start: 2023-07-12

## 2023-06-30 RX ORDER — ENOXAPARIN SODIUM 100 MG/ML
40 INJECTION SUBCUTANEOUS DAILY
Qty: 5.2 ML | Refills: 0 | Status: SHIPPED | OUTPATIENT
Start: 2023-07-12 | End: 2023-07-25

## 2023-06-30 RX ORDER — OXYCODONE HYDROCHLORIDE 5 MG/1
5 TABLET ORAL EVERY 4 HOURS PRN
Qty: 10 TABLET | Refills: 0 | Status: SHIPPED | OUTPATIENT
Start: 2023-07-12

## 2023-07-05 ENCOUNTER — TELEPHONE (OUTPATIENT)
Dept: BARIATRICS | Facility: CLINIC | Age: 33
End: 2023-07-05

## 2023-07-05 NOTE — TELEPHONE ENCOUNTER
Pre-op call was made to patient, to follow up on how they are doing and to remind them to continue with all medical and dietary directions that were given at pre-op class regarding liver shrinking diet and hydration. They were encouraged to purchase all vitamins and protein shakes for post op use as well as to begin Miralax three days prior to surgery as directed in Section 6 of their manual.  They were reminded of the Ensure Pre-surgery drinks protocol and to bring their completed yellow form with them to surgery as well as their CPAP-BiPAP machine if they use one. Lastly, they were informed that they would be weighed the morning of surgery and to give the office a call if they had any further questions or concerns.

## 2023-07-08 DIAGNOSIS — R42 DIZZINESS: ICD-10-CM

## 2023-07-10 ENCOUNTER — ANESTHESIA EVENT (OUTPATIENT)
Dept: PERIOP | Facility: HOSPITAL | Age: 33
DRG: 621 | End: 2023-07-10
Payer: COMMERCIAL

## 2023-07-10 RX ORDER — MECLIZINE HYDROCHLORIDE 25 MG/1
25 TABLET ORAL 3 TIMES DAILY PRN
Qty: 30 TABLET | Refills: 0 | Status: SHIPPED | OUTPATIENT
Start: 2023-07-10

## 2023-07-11 ENCOUNTER — RA CDI HCC (OUTPATIENT)
Dept: OTHER | Facility: HOSPITAL | Age: 33
End: 2023-07-11

## 2023-07-11 ENCOUNTER — ANESTHESIA (OUTPATIENT)
Dept: PERIOP | Facility: HOSPITAL | Age: 33
DRG: 621 | End: 2023-07-11
Payer: COMMERCIAL

## 2023-07-11 ENCOUNTER — HOSPITAL ENCOUNTER (INPATIENT)
Facility: HOSPITAL | Age: 33
LOS: 1 days | Discharge: HOME | DRG: 621 | End: 2023-07-12
Attending: SURGERY | Admitting: SURGERY
Payer: COMMERCIAL

## 2023-07-11 DIAGNOSIS — E66.9 OBESITY, UNSPECIFIED: ICD-10-CM

## 2023-07-11 DIAGNOSIS — E28.2 POLYCYSTIC OVARIES: ICD-10-CM

## 2023-07-11 DIAGNOSIS — K21.9 GASTROESOPHAGEAL REFLUX DISEASE, UNSPECIFIED WHETHER ESOPHAGITIS PRESENT: ICD-10-CM

## 2023-07-11 DIAGNOSIS — E66.01 MORBID OBESITY (HCC): Primary | ICD-10-CM

## 2023-07-11 LAB
EXT PREGNANCY TEST URINE: NEGATIVE
EXT. CONTROL: NORMAL

## 2023-07-11 PROCEDURE — 0DB64Z3 EXCISION OF STOMACH, PERCUTANEOUS ENDOSCOPIC APPROACH, VERTICAL: ICD-10-PCS | Performed by: SURGERY

## 2023-07-11 PROCEDURE — 81025 URINE PREGNANCY TEST: CPT | Performed by: STUDENT IN AN ORGANIZED HEALTH CARE EDUCATION/TRAINING PROGRAM

## 2023-07-11 PROCEDURE — 43775 LAP SLEEVE GASTRECTOMY: CPT | Performed by: SURGERY

## 2023-07-11 PROCEDURE — S2900 ROBOTIC SURGICAL SYSTEM: HCPCS | Performed by: SURGERY

## 2023-07-11 PROCEDURE — 43775 LAP SLEEVE GASTRECTOMY: CPT | Performed by: STUDENT IN AN ORGANIZED HEALTH CARE EDUCATION/TRAINING PROGRAM

## 2023-07-11 PROCEDURE — C9290 INJ, BUPIVACAINE LIPOSOME: HCPCS | Performed by: STUDENT IN AN ORGANIZED HEALTH CARE EDUCATION/TRAINING PROGRAM

## 2023-07-11 PROCEDURE — 88307 TISSUE EXAM BY PATHOLOGIST: CPT | Performed by: PATHOLOGY

## 2023-07-11 PROCEDURE — 8E0W4CZ ROBOTIC ASSISTED PROCEDURE OF TRUNK REGION, PERCUTANEOUS ENDOSCOPIC APPROACH: ICD-10-PCS | Performed by: SURGERY

## 2023-07-11 RX ORDER — HYDRALAZINE HYDROCHLORIDE 20 MG/ML
5 INJECTION INTRAMUSCULAR; INTRAVENOUS ONCE
Status: DISCONTINUED | OUTPATIENT
Start: 2023-07-11 | End: 2023-07-12 | Stop reason: HOSPADM

## 2023-07-11 RX ORDER — HYDRALAZINE HYDROCHLORIDE 20 MG/ML
10 INJECTION INTRAMUSCULAR; INTRAVENOUS ONCE
Status: COMPLETED | OUTPATIENT
Start: 2023-07-11 | End: 2023-07-11

## 2023-07-11 RX ORDER — OXYCODONE HCL 5 MG/5 ML
10 SOLUTION, ORAL ORAL EVERY 4 HOURS PRN
Status: DISCONTINUED | OUTPATIENT
Start: 2023-07-11 | End: 2023-07-12 | Stop reason: HOSPADM

## 2023-07-11 RX ORDER — ACETAMINOPHEN 325 MG/1
975 TABLET ORAL EVERY 8 HOURS
Status: DISCONTINUED | OUTPATIENT
Start: 2023-07-11 | End: 2023-07-12 | Stop reason: HOSPADM

## 2023-07-11 RX ORDER — MAGNESIUM HYDROXIDE 1200 MG/15ML
LIQUID ORAL AS NEEDED
Status: DISCONTINUED | OUTPATIENT
Start: 2023-07-11 | End: 2023-07-11 | Stop reason: HOSPADM

## 2023-07-11 RX ORDER — ACETAMINOPHEN 160 MG/5ML
975 SUSPENSION ORAL EVERY 8 HOURS
Status: DISCONTINUED | OUTPATIENT
Start: 2023-07-11 | End: 2023-07-12 | Stop reason: HOSPADM

## 2023-07-11 RX ORDER — FENTANYL CITRATE 50 UG/ML
INJECTION, SOLUTION INTRAMUSCULAR; INTRAVENOUS AS NEEDED
Status: DISCONTINUED | OUTPATIENT
Start: 2023-07-11 | End: 2023-07-11

## 2023-07-11 RX ORDER — LIDOCAINE HYDROCHLORIDE 10 MG/ML
INJECTION, SOLUTION EPIDURAL; INFILTRATION; INTRACAUDAL; PERINEURAL AS NEEDED
Status: DISCONTINUED | OUTPATIENT
Start: 2023-07-11 | End: 2023-07-11

## 2023-07-11 RX ORDER — MIDAZOLAM HYDROCHLORIDE 2 MG/2ML
INJECTION, SOLUTION INTRAMUSCULAR; INTRAVENOUS AS NEEDED
Status: DISCONTINUED | OUTPATIENT
Start: 2023-07-11 | End: 2023-07-11

## 2023-07-11 RX ORDER — HYDROMORPHONE HCL/PF 1 MG/ML
0.5 SYRINGE (ML) INJECTION
Status: DISCONTINUED | OUTPATIENT
Start: 2023-07-11 | End: 2023-07-11 | Stop reason: HOSPADM

## 2023-07-11 RX ORDER — PROPOFOL 10 MG/ML
INJECTION, EMULSION INTRAVENOUS AS NEEDED
Status: DISCONTINUED | OUTPATIENT
Start: 2023-07-11 | End: 2023-07-11

## 2023-07-11 RX ORDER — SIMETHICONE 80 MG
80 TABLET,CHEWABLE ORAL 4 TIMES DAILY PRN
Status: DISCONTINUED | OUTPATIENT
Start: 2023-07-11 | End: 2023-07-12 | Stop reason: HOSPADM

## 2023-07-11 RX ORDER — FENTANYL CITRATE/PF 50 MCG/ML
50 SYRINGE (ML) INJECTION
Status: DISCONTINUED | OUTPATIENT
Start: 2023-07-11 | End: 2023-07-11 | Stop reason: HOSPADM

## 2023-07-11 RX ORDER — CEFAZOLIN SODIUM 2 G/50ML
2000 SOLUTION INTRAVENOUS ONCE
Status: COMPLETED | OUTPATIENT
Start: 2023-07-11 | End: 2023-07-11

## 2023-07-11 RX ORDER — BUPIVACAINE HYDROCHLORIDE 5 MG/ML
INJECTION, SOLUTION EPIDURAL; INTRACAUDAL AS NEEDED
Status: DISCONTINUED | OUTPATIENT
Start: 2023-07-11 | End: 2023-07-11 | Stop reason: HOSPADM

## 2023-07-11 RX ORDER — ENOXAPARIN SODIUM 100 MG/ML
40 INJECTION SUBCUTANEOUS
Status: COMPLETED | OUTPATIENT
Start: 2023-07-11 | End: 2023-07-11

## 2023-07-11 RX ORDER — ONDANSETRON 2 MG/ML
4 INJECTION INTRAMUSCULAR; INTRAVENOUS ONCE AS NEEDED
Status: COMPLETED | OUTPATIENT
Start: 2023-07-11 | End: 2023-07-11

## 2023-07-11 RX ORDER — ALBUTEROL SULFATE 90 UG/1
2 AEROSOL, METERED RESPIRATORY (INHALATION) EVERY 6 HOURS PRN
Status: DISCONTINUED | OUTPATIENT
Start: 2023-07-11 | End: 2023-07-12 | Stop reason: HOSPADM

## 2023-07-11 RX ORDER — METOCLOPRAMIDE HYDROCHLORIDE 5 MG/ML
10 INJECTION INTRAMUSCULAR; INTRAVENOUS EVERY 6 HOURS PRN
Status: DISCONTINUED | OUTPATIENT
Start: 2023-07-11 | End: 2023-07-12 | Stop reason: HOSPADM

## 2023-07-11 RX ORDER — SCOLOPAMINE TRANSDERMAL SYSTEM 1 MG/1
1 PATCH, EXTENDED RELEASE TRANSDERMAL ONCE
Status: DISCONTINUED | OUTPATIENT
Start: 2023-07-11 | End: 2023-07-12 | Stop reason: HOSPADM

## 2023-07-11 RX ORDER — DIPHENHYDRAMINE HCL 25 MG
25 TABLET ORAL EVERY 8 HOURS PRN
Status: DISCONTINUED | OUTPATIENT
Start: 2023-07-11 | End: 2023-07-12 | Stop reason: HOSPADM

## 2023-07-11 RX ORDER — ONDANSETRON 2 MG/ML
INJECTION INTRAMUSCULAR; INTRAVENOUS AS NEEDED
Status: DISCONTINUED | OUTPATIENT
Start: 2023-07-11 | End: 2023-07-11

## 2023-07-11 RX ORDER — ROCURONIUM BROMIDE 10 MG/ML
INJECTION, SOLUTION INTRAVENOUS AS NEEDED
Status: DISCONTINUED | OUTPATIENT
Start: 2023-07-11 | End: 2023-07-11

## 2023-07-11 RX ORDER — MORPHINE SULFATE 10 MG/ML
4 INJECTION, SOLUTION INTRAMUSCULAR; INTRAVENOUS EVERY 4 HOURS PRN
Status: DISCONTINUED | OUTPATIENT
Start: 2023-07-11 | End: 2023-07-11 | Stop reason: CLARIF

## 2023-07-11 RX ORDER — SODIUM CHLORIDE 9 MG/ML
125 INJECTION, SOLUTION INTRAVENOUS CONTINUOUS
Status: DISCONTINUED | OUTPATIENT
Start: 2023-07-11 | End: 2023-07-11

## 2023-07-11 RX ORDER — MORPHINE SULFATE 4 MG/ML
4 INJECTION, SOLUTION INTRAMUSCULAR; INTRAVENOUS EVERY 4 HOURS PRN
Status: DISCONTINUED | OUTPATIENT
Start: 2023-07-11 | End: 2023-07-12 | Stop reason: HOSPADM

## 2023-07-11 RX ORDER — ONDANSETRON 2 MG/ML
4 INJECTION INTRAMUSCULAR; INTRAVENOUS EVERY 6 HOURS PRN
Status: DISCONTINUED | OUTPATIENT
Start: 2023-07-11 | End: 2023-07-12 | Stop reason: HOSPADM

## 2023-07-11 RX ORDER — OXYCODONE HCL 5 MG/5 ML
5 SOLUTION, ORAL ORAL EVERY 4 HOURS PRN
Status: DISCONTINUED | OUTPATIENT
Start: 2023-07-11 | End: 2023-07-12 | Stop reason: HOSPADM

## 2023-07-11 RX ORDER — SODIUM CHLORIDE, SODIUM LACTATE, POTASSIUM CHLORIDE, CALCIUM CHLORIDE 600; 310; 30; 20 MG/100ML; MG/100ML; MG/100ML; MG/100ML
100 INJECTION, SOLUTION INTRAVENOUS CONTINUOUS
Status: DISCONTINUED | OUTPATIENT
Start: 2023-07-11 | End: 2023-07-12 | Stop reason: HOSPADM

## 2023-07-11 RX ORDER — FAMOTIDINE 10 MG/ML
20 INJECTION, SOLUTION INTRAVENOUS 2 TIMES DAILY
Status: DISCONTINUED | OUTPATIENT
Start: 2023-07-11 | End: 2023-07-12 | Stop reason: HOSPADM

## 2023-07-11 RX ORDER — PROMETHAZINE HYDROCHLORIDE 25 MG/ML
25 INJECTION, SOLUTION INTRAMUSCULAR; INTRAVENOUS EVERY 6 HOURS PRN
Status: DISCONTINUED | OUTPATIENT
Start: 2023-07-11 | End: 2023-07-12 | Stop reason: HOSPADM

## 2023-07-11 RX ORDER — ENOXAPARIN SODIUM 100 MG/ML
40 INJECTION SUBCUTANEOUS EVERY 24 HOURS
Status: DISCONTINUED | OUTPATIENT
Start: 2023-07-12 | End: 2023-07-12 | Stop reason: HOSPADM

## 2023-07-11 RX ORDER — MECLIZINE HYDROCHLORIDE 25 MG/1
25 TABLET ORAL 3 TIMES DAILY PRN
Status: DISCONTINUED | OUTPATIENT
Start: 2023-07-11 | End: 2023-07-12 | Stop reason: HOSPADM

## 2023-07-11 RX ORDER — CELECOXIB 200 MG/1
200 CAPSULE ORAL ONCE
Status: COMPLETED | OUTPATIENT
Start: 2023-07-11 | End: 2023-07-11

## 2023-07-11 RX ORDER — DEXAMETHASONE SODIUM PHOSPHATE 10 MG/ML
INJECTION, SOLUTION INTRAMUSCULAR; INTRAVENOUS AS NEEDED
Status: DISCONTINUED | OUTPATIENT
Start: 2023-07-11 | End: 2023-07-11

## 2023-07-11 RX ORDER — ACETAMINOPHEN 325 MG/1
975 TABLET ORAL ONCE
Status: COMPLETED | OUTPATIENT
Start: 2023-07-11 | End: 2023-07-11

## 2023-07-11 RX ADMIN — ROCURONIUM BROMIDE 50 MG: 10 INJECTION, SOLUTION INTRAVENOUS at 07:38

## 2023-07-11 RX ADMIN — DEXAMETHASONE SODIUM PHOSPHATE 10 MG: 10 INJECTION INTRAMUSCULAR; INTRAVENOUS at 07:37

## 2023-07-11 RX ADMIN — ONDANSETRON 4 MG: 2 INJECTION INTRAMUSCULAR; INTRAVENOUS at 09:34

## 2023-07-11 RX ADMIN — ROCURONIUM BROMIDE 20 MG: 10 INJECTION, SOLUTION INTRAVENOUS at 08:19

## 2023-07-11 RX ADMIN — HYDRALAZINE HYDROCHLORIDE 10 MG: 20 INJECTION, SOLUTION INTRAMUSCULAR; INTRAVENOUS at 13:03

## 2023-07-11 RX ADMIN — ENOXAPARIN SODIUM 40 MG: 40 INJECTION SUBCUTANEOUS at 05:40

## 2023-07-11 RX ADMIN — HYDRALAZINE HYDROCHLORIDE 10 MG: 20 INJECTION, SOLUTION INTRAMUSCULAR; INTRAVENOUS at 10:21

## 2023-07-11 RX ADMIN — SODIUM CHLORIDE, SODIUM LACTATE, POTASSIUM CHLORIDE, AND CALCIUM CHLORIDE 100 ML/HR: .6; .31; .03; .02 INJECTION, SOLUTION INTRAVENOUS at 11:16

## 2023-07-11 RX ADMIN — PROMETHAZINE HYDROCHLORIDE 25 MG: 25 INJECTION INTRAMUSCULAR; INTRAVENOUS at 13:51

## 2023-07-11 RX ADMIN — SODIUM CHLORIDE, SODIUM LACTATE, POTASSIUM CHLORIDE, AND CALCIUM CHLORIDE 100 ML/HR: .6; .31; .03; .02 INJECTION, SOLUTION INTRAVENOUS at 21:21

## 2023-07-11 RX ADMIN — CEFAZOLIN SODIUM 2000 MG: 2 SOLUTION INTRAVENOUS at 07:41

## 2023-07-11 RX ADMIN — MIDAZOLAM 2 MG: 1 INJECTION INTRAMUSCULAR; INTRAVENOUS at 07:25

## 2023-07-11 RX ADMIN — CELECOXIB 200 MG: 200 CAPSULE ORAL at 05:38

## 2023-07-11 RX ADMIN — FENTANYL CITRATE 50 MCG: 50 INJECTION INTRAMUSCULAR; INTRAVENOUS at 08:20

## 2023-07-11 RX ADMIN — LIDOCAINE HYDROCHLORIDE 100 MG: 10 INJECTION, SOLUTION EPIDURAL; INFILTRATION; INTRACAUDAL; PERINEURAL at 07:37

## 2023-07-11 RX ADMIN — FENTANYL CITRATE 50 MCG: 50 INJECTION INTRAMUSCULAR; INTRAVENOUS at 09:53

## 2023-07-11 RX ADMIN — FAMOTIDINE 20 MG: 10 INJECTION INTRAVENOUS at 21:21

## 2023-07-11 RX ADMIN — SODIUM CHLORIDE 125 ML/HR: 0.9 INJECTION, SOLUTION INTRAVENOUS at 05:48

## 2023-07-11 RX ADMIN — SUGAMMADEX 225 MG: 100 INJECTION, SOLUTION INTRAVENOUS at 08:59

## 2023-07-11 RX ADMIN — FENTANYL CITRATE 25 MCG: 50 INJECTION INTRAMUSCULAR; INTRAVENOUS at 08:33

## 2023-07-11 RX ADMIN — PROPOFOL 200 MG: 10 INJECTION, EMULSION INTRAVENOUS at 07:37

## 2023-07-11 RX ADMIN — FENTANYL CITRATE 25 MCG: 50 INJECTION INTRAMUSCULAR; INTRAVENOUS at 09:01

## 2023-07-11 RX ADMIN — FENTANYL CITRATE 50 MCG: 50 INJECTION INTRAMUSCULAR; INTRAVENOUS at 09:16

## 2023-07-11 RX ADMIN — FENTANYL CITRATE 100 MCG: 50 INJECTION INTRAMUSCULAR; INTRAVENOUS at 07:37

## 2023-07-11 RX ADMIN — MORPHINE SULFATE 4 MG: 4 INJECTION INTRAVENOUS at 11:26

## 2023-07-11 RX ADMIN — METOCLOPRAMIDE 10 MG: 5 INJECTION, SOLUTION INTRAMUSCULAR; INTRAVENOUS at 11:12

## 2023-07-11 RX ADMIN — SIMETHICONE 80 MG: 80 TABLET, CHEWABLE ORAL at 13:06

## 2023-07-11 RX ADMIN — FENTANYL CITRATE 50 MCG: 50 INJECTION INTRAMUSCULAR; INTRAVENOUS at 08:02

## 2023-07-11 RX ADMIN — FENTANYL CITRATE 50 MCG: 50 INJECTION INTRAMUSCULAR; INTRAVENOUS at 09:38

## 2023-07-11 RX ADMIN — ACETAMINOPHEN 325MG 975 MG: 325 TABLET ORAL at 05:38

## 2023-07-11 RX ADMIN — FAMOTIDINE 20 MG: 10 INJECTION INTRAVENOUS at 11:26

## 2023-07-11 RX ADMIN — ONDANSETRON 4 MG: 2 INJECTION INTRAMUSCULAR; INTRAVENOUS at 08:55

## 2023-07-11 RX ADMIN — ACETAMINOPHEN 975 MG: 325 SUSPENSION ORAL at 19:22

## 2023-07-11 RX ADMIN — ONDANSETRON 4 MG: 2 INJECTION INTRAMUSCULAR; INTRAVENOUS at 19:19

## 2023-07-11 RX ADMIN — SODIUM CHLORIDE: 0.9 INJECTION, SOLUTION INTRAVENOUS at 08:23

## 2023-07-11 RX ADMIN — SCOPALAMINE 1 PATCH: 1 PATCH, EXTENDED RELEASE TRANSDERMAL at 05:39

## 2023-07-11 NOTE — PLAN OF CARE
Problem: PAIN - ADULT  Goal: Verbalizes/displays adequate comfort level or baseline comfort level  Description: Interventions:  - Encourage patient to monitor pain and request assistance  - Assess pain using appropriate pain scale  - Administer analgesics based on type and severity of pain and evaluate response  - Implement non-pharmacological measures as appropriate and evaluate response  - Consider cultural and social influences on pain and pain management  - Notify physician/advanced practitioner if interventions unsuccessful or patient reports new pain  Outcome: Progressing     Problem: INFECTION - ADULT  Goal: Absence or prevention of progression during hospitalization  Description: INTERVENTIONS:  - Assess and monitor for signs and symptoms of infection  - Monitor lab/diagnostic results  - Monitor all insertion sites, i.e. indwelling lines, tubes, and drains  - Monitor endotracheal if appropriate and nasal secretions for changes in amount and color  - Cassandra appropriate cooling/warming therapies per order  - Administer medications as ordered  - Instruct and encourage patient and family to use good hand hygiene technique  - Identify and instruct in appropriate isolation precautions for identified infection/condition  Outcome: Progressing  Goal: Absence of fever/infection during neutropenic period  Description: INTERVENTIONS:  - Monitor WBC    Outcome: Progressing     Problem: SAFETY ADULT  Goal: Patient will remain free of falls  Description: INTERVENTIONS:  - Educate patient/family on patient safety including physical limitations  - Instruct patient to call for assistance with activity   - Consult OT/PT to assist with strengthening/mobility   - Keep Call bell within reach  - Keep bed low and locked with side rails adjusted as appropriate  - Keep care items and personal belongings within reach  - Initiate and maintain comfort rounds  - Make Fall Risk Sign visible to staff  - Offer Toileting every  Hours, in advance of need  - Initiate/Maintain alarm  - Obtain necessary fall risk management equipment:   - Apply yellow socks and bracelet for high fall risk patients  - Consider moving patient to room near nurses station  Outcome: Progressing  Goal: Maintain or return to baseline ADL function  Description: INTERVENTIONS:  -  Assess patient's ability to carry out ADLs; assess patient's baseline for ADL function and identify physical deficits which impact ability to perform ADLs (bathing, care of mouth/teeth, toileting, grooming, dressing, etc.)  - Assess/evaluate cause of self-care deficits   - Assess range of motion  - Assess patient's mobility; develop plan if impaired  - Assess patient's need for assistive devices and provide as appropriate  - Encourage maximum independence but intervene and supervise when necessary  - Involve family in performance of ADLs  - Assess for home care needs following discharge   - Consider OT consult to assist with ADL evaluation and planning for discharge  - Provide patient education as appropriate  Outcome: Progressing  Goal: Maintains/Returns to pre admission functional level  Description: INTERVENTIONS:  - Perform BMAT or MOVE assessment daily.   - Set and communicate daily mobility goal to care team and patient/family/caregiver. - Collaborate with rehabilitation services on mobility goals if consulted  - Perform Range of Motion  times a day. - Reposition patient every  hours.   - Dangle patient  times a day  - Stand patient  times a day  - Ambulate patient  times a day  - Out of bed to chair  times a day   - Out of bed for meals times a day  - Out of bed for toileting  - Record patient progress and toleration of activity level   Outcome: Progressing     Problem: DISCHARGE PLANNING  Goal: Discharge to home or other facility with appropriate resources  Description: INTERVENTIONS:  - Identify barriers to discharge w/patient and caregiver  - Arrange for needed discharge resources and transportation as appropriate  - Identify discharge learning needs (meds, wound care, etc.)  - Arrange for interpretive services to assist at discharge as needed  - Refer to Case Management Department for coordinating discharge planning if the patient needs post-hospital services based on physician/advanced practitioner order or complex needs related to functional status, cognitive ability, or social support system  Outcome: Progressing     Problem: Knowledge Deficit  Goal: Patient/family/caregiver demonstrates understanding of disease process, treatment plan, medications, and discharge instructions  Description: Complete learning assessment and assess knowledge base.   Interventions:  - Provide teaching at level of understanding  - Provide teaching via preferred learning methods  Outcome: Progressing

## 2023-07-11 NOTE — ANESTHESIA POSTPROCEDURE EVALUATION
Post-Op Assessment Note    CV Status:  Stable    Pain management: adequate     Mental Status:  Alert and awake   Hydration Status:  Euvolemic   PONV Controlled:  Controlled   Airway Patency:  Patent      Post Op Vitals Reviewed: Yes      Staff: Anesthesiologist, CRNA         No notable events documented.     BP      Temp     Pulse     Resp      SpO2      /74   Pulse 74   Temp (!) 97.3 °F (36.3 °C) (Temporal)   Resp 20   Ht 5' 6.5" (1.689 m)   Wt 105 kg (230 lb 13.2 oz)   SpO2 97%   BMI 36.70 kg/m²

## 2023-07-11 NOTE — INTERVAL H&P NOTE
H&P reviewed. After examining the patient I find no changes in the patients condition since the H&P had been written.     Vitals:    07/11/23 0529   BP: 143/78   Pulse: 96   Resp: 16   Temp: 98.1 °F (36.7 °C)   SpO2: 98%

## 2023-07-11 NOTE — ANESTHESIA PREPROCEDURE EVALUATION
Procedure:  GASTRECTOMY  SLEEVE W/ ROBOT (Abdomen)    Relevant Problems   ANESTHESIA (within normal limits)      CARDIO (within normal limits)      ENDO (within normal limits)      GI/HEPATIC   (+) GERD (gastroesophageal reflux disease)      HEMATOLOGY (within normal limits)      Endocrine   (+) PCOS (polycystic ovarian syndrome)        Physical Exam    Airway    Mallampati score: I  TM Distance: <3 FB  Neck ROM: full     Dental   No notable dental hx     Cardiovascular  Cardiovascular exam normal    Pulmonary  Pulmonary exam normal     Other Findings        Anesthesia Plan  ASA Score- 2     Anesthesia Type- general with ASA Monitors. Additional Monitors:   Airway Plan: ETT. Plan Factors-    Chart reviewed. Patient summary reviewed. Induction- intravenous. Postoperative Plan-     Informed Consent- Anesthetic plan and risks discussed with patient.

## 2023-07-11 NOTE — OP NOTE
OPERATIVE REPORT  PATIENT NAME: Omar Pavon    :  1990  MRN: [id number]  Pt Location: [facility] OR ROOM 08    SURGERY DATE: 2023    Surgeon(s) and Role:     * Romel Meng MD - Primary     * Lurena Schilder, DO - Assisting    Preop Diagnosis:  Obesity, unspecified [E66.9]  Polycystic ovaries [E28.2]  Gastroesophageal reflux disease, unspecified whether esophagitis present [K21.9]    Post-Op Diagnosis Codes:     * Obesity, unspecified [E66.9]     * Polycystic ovaries [E28.2]     * Gastroesophageal reflux disease, unspecified whether esophagitis present [K21.9]    Procedure(s):  GASTRECTOMY  SLEEVE W/ ROBOT    Specimen(s):  ID Type Source Tests Collected by Time Destination   1 : PORTION OF STOMACH Tissue Stomach TISSUE EXAM Romel Meng MD 2023 [id number]        Estimated Blood Loss:   20 ml    Drains:  * No LDAs found *    Anesthesia Type:   General    Operative Indications:  Obesity, unspecified [E66.9]  Polycystic ovaries [E28.2]  Gastroesophageal reflux disease, unspecified whether esophagitis present [K21.9]      Operative Findings:  Normal findings    Complications:   None    Procedure and Technique:  Robotic sleeve gastrectomy   I was present for the entire procedure. Patient Disposition:  hemodynamically stable     No qualified resident was available  Assistant was necessary for instrument exchange and counter traction and assistance with stapling     Indication:   Patient suffers from morbid obesity and associated co-morbidities  and failed to achieve any meaningful or sustainable weight loss and was therefore consented to undergo a laparoscopic sleeve gastrectomy. Risks and benefits were explained to the patient, patient consented for the procedure. Procedure: The patient was brought to the operating room and placed in a supine position. The patient received a dose of IV antibiotics and a dose of subcutaneous Lovenox prior to the procedure.  The patient was induced under general endotracheal anesthesia. The abdominal wall was prepped and draped under sterile conditions in the usual fashion. The procedure was started by obtaining access to the abdominal cavity using a Veress needle to the left side of the midline around 6 inches from the xiphoid the abdominal cavity was insufflated with CO2 to a pressure of 15 mmHg. After that, the abdomen was entered with an 8 mm trocar using an Optiview trocar under direct visualization. A TAP block was performed using Exparel mixed with saline and 0.5 % Marcaine for a total of 100 ml. At that point, two 8 and 12 mm robotic trocars were placed on the right side of the abdominal wall, under direct visualization, and another 8 mm robotic trocar and 12 mm assistant port were placed on the left side of the abdominal wall, also under direct visualization. Patient was then placed in a reverse Trendelenburg position. A Alexi retractor was placed through a small stab incision below the xiphoid and was used to retract the left lobe of the liver in a medial fashion. The robot was then brought into the surgical field and the arms docked. At that point, we turned our attention to the pylorus and using the vessel sealerl, the gastrocolic ligament was taken down starting 4 cm proximal to the pylorus, all the way up to the level of the short gastric vessels which were taken down with the vessel sealer  as well. The angle of His was also dissected and all the posterior attachments of the fundus were taken down with the vessel sealer, the spleen was kept out of harm’s way and the left glen was exposed and the stomach was completely mobilized off the left glen and rotated medially. At that point, the anesthesiologist was asked to insert a 36-Maltese Bougie.   The Bougie was secured in place by the anesthesiologist.     We started transecting the stomach using a 60 mm SureForm stapler, the compression feedback of the stapler was used to guide the choice of cartridges, the 36-Vietnamese Bougie was kept in place throughout the performance of the sleeve gastrectomy. We made particular attention during the transaction of the greater curvature to retract the stomach laterally at the site of the transected vessels  to prevent corkscrewing of the gastric sleeve. We also avoided getting too close to the incisura to prevent  narrowing at the level of the incisura. The staple line was hemostatic and well formed and there was no evidence of narrowing at the incisura. The staple line was then imbricated using 2-0 V LOC suture in a running fashion while the bougie is in place. At the end, the anesthesiologist was asked to remove the Veronicaport. The surgical field was inspected one more time and appeared hemostatic. We then removed the [facility] liver retractor. The 12 mm port was extended, and then it was dilated. After that, the stomach specimen was retrieved and sent to Pathology. Sponge count was completed and was correct. The 12 mm port was then closed using #1 Vicryl in a simple fashion. All the trocars were removed under direct visualization, and the skin edges were approximated using 4-0 Monocryl in an interrupted, inverted fashion. Histoacryl was then applied to the skin incisions.       The patient was extubated and transferred to the PACU in stable condition        SIGNATURE: Patricio Aldrich MD  DATE: July 11, 2023  TIME: 8:57 AM

## 2023-07-11 NOTE — PROGRESS NOTES
[address] [facility] coding opportunities       Chart reviewed, no opportunity found: CHART REVIEWED, NO OPPORTUNITY FOUND        Patients Insurance        Commercial Insurance: [address]

## 2023-07-12 VITALS
OXYGEN SATURATION: 99 % | TEMPERATURE: 97.9 F | HEART RATE: 72 BPM | BODY MASS INDEX: 36.23 KG/M2 | HEIGHT: 67 IN | RESPIRATION RATE: 19 BRPM | SYSTOLIC BLOOD PRESSURE: 132 MMHG | DIASTOLIC BLOOD PRESSURE: 76 MMHG | WEIGHT: 230.82 LBS

## 2023-07-12 LAB
ANION GAP SERPL CALCULATED.3IONS-SCNC: 9 MMOL/L
BUN SERPL-MCNC: 4 MG/DL (ref 5–25)
CALCIUM SERPL-MCNC: 9.3 MG/DL (ref 8.4–10.2)
CHLORIDE SERPL-SCNC: 107 MMOL/L (ref 96–108)
CO2 SERPL-SCNC: 23 MMOL/L (ref 21–32)
CREAT SERPL-MCNC: 0.56 MG/DL (ref 0.6–1.3)
ERYTHROCYTE [DISTWIDTH] IN BLOOD BY AUTOMATED COUNT: 12 % (ref 11.6–15.1)
GFR SERPL CREATININE-BSD FRML MDRD: 123 ML/MIN/1.73SQ M
GLUCOSE SERPL-MCNC: 100 MG/DL (ref 65–140)
HCT VFR BLD AUTO: 42.3 % (ref 34.8–46.1)
HGB BLD-MCNC: 14.1 G/DL (ref 11.5–15.4)
MCH RBC QN AUTO: 30.9 PG (ref 26.8–34.3)
MCHC RBC AUTO-ENTMCNC: 33.3 G/DL (ref 31.4–37.4)
MCV RBC AUTO: 93 FL (ref 82–98)
PLATELET # BLD AUTO: 213 THOUSANDS/UL (ref 149–390)
PMV BLD AUTO: 10.6 FL (ref 8.9–12.7)
POTASSIUM SERPL-SCNC: 3.4 MMOL/L (ref 3.5–5.3)
RBC # BLD AUTO: 4.57 MILLION/UL (ref 3.81–5.12)
SODIUM SERPL-SCNC: 139 MMOL/L (ref 135–147)
WBC # BLD AUTO: 13.7 THOUSAND/UL (ref 4.31–10.16)

## 2023-07-12 PROCEDURE — 99024 POSTOP FOLLOW-UP VISIT: CPT | Performed by: STUDENT IN AN ORGANIZED HEALTH CARE EDUCATION/TRAINING PROGRAM

## 2023-07-12 PROCEDURE — NC001 PR NO CHARGE: Performed by: SURGERY

## 2023-07-12 PROCEDURE — 85027 COMPLETE CBC AUTOMATED: CPT | Performed by: PHYSICIAN ASSISTANT

## 2023-07-12 PROCEDURE — 80048 BASIC METABOLIC PNL TOTAL CA: CPT | Performed by: PHYSICIAN ASSISTANT

## 2023-07-12 RX ORDER — ACETAMINOPHEN 160 MG/5ML
975 SUSPENSION ORAL EVERY 8 HOURS
Qty: 638.4 ML | Refills: 0 | Status: SHIPPED | OUTPATIENT
Start: 2023-07-12 | End: 2023-07-19

## 2023-07-12 RX ADMIN — SIMETHICONE 80 MG: 80 TABLET, CHEWABLE ORAL at 08:48

## 2023-07-12 RX ADMIN — FAMOTIDINE 20 MG: 10 INJECTION INTRAVENOUS at 08:43

## 2023-07-12 RX ADMIN — DIPHENHYDRAMINE HCL 25 MG: 25 TABLET, COATED ORAL at 08:43

## 2023-07-12 RX ADMIN — SODIUM CHLORIDE, SODIUM LACTATE, POTASSIUM CHLORIDE, AND CALCIUM CHLORIDE 100 ML/HR: .6; .31; .03; .02 INJECTION, SOLUTION INTRAVENOUS at 05:33

## 2023-07-12 RX ADMIN — ENOXAPARIN SODIUM 40 MG: 40 INJECTION SUBCUTANEOUS at 09:58

## 2023-07-12 RX ADMIN — ACETAMINOPHEN 975 MG: 325 SUSPENSION ORAL at 05:33

## 2023-07-12 RX ADMIN — ACETAMINOPHEN 325MG 975 MG: 325 TABLET ORAL at 11:22

## 2023-07-12 RX ADMIN — ONDANSETRON 4 MG: 2 INJECTION INTRAMUSCULAR; INTRAVENOUS at 11:31

## 2023-07-12 NOTE — UTILIZATION REVIEW
Initial Clinical Review    Elective   IP    surgical procedure    Age/Sex: 29 y.o. female     Surgery Date:    7/11/23    Procedure: GASTRECTOMY  SLEEVE W/ ROBOT    Anesthesia:    general    Operative Findings:    normal    POD#1 Progress Note:   7/12   D/C  home    Admission Orders: Date/Time/Statement:   Admission Orders (From admission, onward)     Ordered        07/11/23 0835  Inpatient Admission  Once                      Orders Placed This Encounter   Procedures   • Inpatient Admission     Standing Status:   Standing     Number of Occurrences:   1     Order Specific Question:   Level of Care     Answer:   Med Surg [16]     Order Specific Question:   Estimated length of stay     Answer:   Inpatient Only Surgery     Vital Signs: /76 (BP Location: Right arm)   Pulse 72   Temp 97.9 °F (36.6 °C) (Oral)   Resp 19   Ht 5' 6.5" (1.689 m)   Wt 105 kg (230 lb 13.2 oz)   SpO2 99%   BMI 36.70 kg/m²     Pertinent Labs/Diagnostic Test Results:       Results from last 7 days   Lab Units 07/12/23  0435   WBC Thousand/uL 13.70*   HEMOGLOBIN g/dL 14.1   HEMATOCRIT % 42.3   PLATELETS Thousands/uL 213         Results from last 7 days   Lab Units 07/12/23  0435   SODIUM mmol/L 139   POTASSIUM mmol/L 3.4*   CHLORIDE mmol/L 107   CO2 mmol/L 23   ANION GAP mmol/L 9   BUN mg/dL 4*   CREATININE mg/dL 0.56*   EGFR ml/min/1.73sq m 123   CALCIUM mg/dL 9.3             Results from last 7 days   Lab Units 07/12/23  0435   GLUCOSE RANDOM mg/dL 100                     Diet:   Bariatric cl liq    Mobility:   Up as  tolerated    DVT Prophylaxis:    SCD'S    Medications/Pain Control:   Scheduled Medications:  acetaminophen, 975 mg, Oral, Q8H   Or  acetaminophen, 975 mg, Oral, Q8H  enoxaparin, 40 mg, Subcutaneous, Q24H  famotidine, 20 mg, Intravenous, BID  hydrALAZINE, 5 mg, Intravenous, Once  scopolamine, 1 patch, Transdermal, Once      Continuous IV Infusions:  lactated ringers, 100 mL/hr, Intravenous, Continuous      PRN Meds:  albuterol, 2 puff, Inhalation, Q6H PRN  diphenhydrAMINE, 25 mg, Oral, Q8H PRN  meclizine, 25 mg, Oral, TID PRN  metoclopramide, 10 mg, Intravenous, Q6H PRN  morphine injection, 4 mg, Intravenous, Q4H PRN  ondansetron, 4 mg, Intravenous, Q6H PRN  oxyCODONE, 10 mg, Oral, Q4H PRN  oxyCODONE, 5 mg, Oral, Q4H PRN  phenol, 2 spray, Mouth/Throat, Q2H PRN  promethazine, 25 mg, Intravenous, Q6H PRN  simethicone, 80 mg, Oral, 4x Daily PRN        Network Utilization Review Department  ATTENTION: Please call with any questions or concerns to [phone] and carefully listen to the prompts so that you are directed to the right person. All voicemails are confidential.  Joshua Muñoz all requests for admission clinical reviews, approved or denied determinations and any other requests to dedicated fax number below belonging to the campus where the patient is receiving treatment.  List of dedicated fax numbers for the Facilities:  [place] DENIALS (Administrative/Medical Necessity) [phone]   [facility] ([facility]/Pediatrics) [phone]   [address] [place] [address] Drive [phone]   [facility] [phone]   [facility] [address] [place] [address] [id number] [place] [address] [phone]   [address] [facility] [address] [facility] [address] [place] [address] [phone]

## 2023-07-12 NOTE — PLAN OF CARE
Problem: PAIN - ADULT  Goal: Verbalizes/displays adequate comfort level or baseline comfort level  Description: Interventions:  - Encourage patient to monitor pain and request assistance  - Assess pain using appropriate pain scale  - Administer analgesics based on type and severity of pain and evaluate response  - Implement non-pharmacological measures as appropriate and evaluate response  - Consider cultural and social influences on pain and pain management  - Notify physician/advanced practitioner if interventions unsuccessful or patient reports new pain  Outcome: Progressing     Problem: INFECTION - ADULT  Goal: Absence or prevention of progression during hospitalization  Description: INTERVENTIONS:  - Assess and monitor for signs and symptoms of infection  - Monitor lab/diagnostic results  - Monitor all insertion sites, i.e. indwelling lines, tubes, and drains  - Monitor endotracheal if appropriate and nasal secretions for changes in amount and color  - [place] appropriate cooling/warming therapies per order  - Administer medications as ordered  - Instruct and encourage patient and family to use good hand hygiene technique  - Identify and instruct in appropriate isolation precautions for identified infection/condition  Outcome: Progressing  Goal: Absence of fever/infection during neutropenic period  Description: INTERVENTIONS:  - Monitor WBC    Outcome: Progressing     Problem: SAFETY ADULT  Goal: Patient will remain free of falls  Description: INTERVENTIONS:  - Educate patient/family on patient safety including physical limitations  - Instruct patient to call for assistance with activity   - Consult OT/PT to assist with strengthening/mobility   - Keep Call bell within reach  - Keep bed low and locked with side rails adjusted as appropriate  - Keep care items and personal belongings within reach  - Initiate and maintain comfort rounds  - Make Fall Risk Sign visible to staff  - Offer Toileting every  Hours, in advance of need  - Initiate/Maintain alarm  - Obtain necessary fall risk management equipment:   - Apply yellow socks and bracelet for high fall risk patients  - Consider moving patient to room near nurses station  Outcome: Progressing  Goal: Maintain or return to baseline ADL function  Description: INTERVENTIONS:  -  Assess patient's ability to carry out ADLs; assess patient's baseline for ADL function and identify physical deficits which impact ability to perform ADLs (bathing, care of mouth/teeth, toileting, grooming, dressing, etc.)  - Assess/evaluate cause of self-care deficits   - Assess range of motion  - Assess patient's mobility; develop plan if impaired  - Assess patient's need for assistive devices and provide as appropriate  - Encourage maximum independence but intervene and supervise when necessary  - Involve family in performance of ADLs  - Assess for home care needs following discharge   - Consider OT consult to assist with ADL evaluation and planning for discharge  - Provide patient education as appropriate  Outcome: Progressing  Goal: Maintains/Returns to pre admission functional level  Description: INTERVENTIONS:  - Perform BMAT or MOVE assessment daily.   - Set and communicate daily mobility goal to care team and patient/family/caregiver. - Collaborate with rehabilitation services on mobility goals if consulted  - Perform Range of Motion  times a day. - Reposition patient every  hours.   - Dangle patient  times a day  - Stand patient  times a day  - Ambulate patient  times a day  - Out of bed to chair  times a day   - Out of bed for meals times a day  - Out of bed for toileting  - Record patient progress and toleration of activity level   Outcome: Progressing     Problem: DISCHARGE PLANNING  Goal: Discharge to home or other facility with appropriate resources  Description: INTERVENTIONS:  - Identify barriers to discharge w/patient and caregiver  - Arrange for needed discharge resources and transportation as appropriate  - Identify discharge learning needs (meds, wound care, etc.)  - Arrange for interpretive services to assist at discharge as needed  - Refer to Case Management Department for coordinating discharge planning if the patient needs post-hospital services based on physician/advanced practitioner order or complex needs related to functional status, cognitive ability, or social support system  Outcome: Progressing     Problem: Knowledge Deficit  Goal: Patient/family/caregiver demonstrates understanding of disease process, treatment plan, medications, and discharge instructions  Description: Complete learning assessment and assess knowledge base.   Interventions:  - Provide teaching at level of understanding  - Provide teaching via preferred learning methods  Outcome: Progressing

## 2023-07-12 NOTE — DISCHARGE INSTR - AVS FIRST PAGE
Bariatric/Weight Loss Surgery  Hospital Discharge Instructions  ACTIVITY:  Progress as feels comfortable - a good rule is:  if you are doing something and it begins to hurt, stop doing the activity. Walk every hour while at home. You may walk stairs if you do so slowly  You may shower 48 hours after surgery. Do not scrub incision sites. Blot gently with clean towel to dry incisions. (see #4 below)   Use your incentive spirometer 10 times per hour while awake for 1 week after surgery. Do NOT drive for 48 hours after surgery. No driving 24 hours after taking certain prescription pain medications. Examples of such medication are Percocet, Darvocet, Oxycodone, Tylenol #3, and Tylenol with Codeine. DIET  Stay on a liquid diet for 7 days after your surgery date, sipping slowly. Refer to your manual for examples of choices. Remember to keep your liquids sugar free or low calorie. You may have protein drinks. Make sure to drink 48 to 64 ounces per day of fluids. You may advance to a pureed diet one week after surgery as instructed by your diet progression pamphlet. Once you get approval from your surgeon at your first post operative visit, you may advance to the soft diet and remain on soft diet for 8 weeks unless otherwise instructed. MEDICATIONS:  The abdominal nerve block will wear off during the first 1-2 days that you are home, and you may become sore (especially over incision site/sites where abdominal wall is sutured). This may create a pulling sensation, especially while moving around, and will fade over time. Continue to take your Tylenol and your pain medication as instructed. Start vitamins and minerals one week after surgery or when you start stage 3/puree diet. Anti-acid Medication as per prescription. Other medications as indicated on the Physician Patient Discharge Instructions form given to you at the time of discharge.   Make sure that you are splitting your pill or tablet medications in halves or fourths or even crushing them before you take them. Capsules should be opened and mixed with water or jello. You need to do this for at least 4 weeks after surgery. Eventually you will be able to take your medications the regular way as they were prescribed. You will need to consult with your Family Doctor in regards to all your prescribed medication, particularly those for blood pressure and diabetes. As you lose weight, medical conditions may change, requiring an alteration or elimination of the drug dose. Monitor blood pressure closely and call PCP with any concerns. Sleeve Gastrectomy patients ONLY:  Complete full course of lovenox injections! DO NOT TAKE BIRTH CONTROL(BC) MEDICATIONS, INSERT BC VAGINAL RINGS, OR PLACE IUD OR ANY OTHER BC METHODS UNTIL 31 DAYS FROM DAY OF DISCHARGE FROM HOSPITAL. THIS PLACES YOU AT HIGH RISK FOR A POTENTIALLY LIFE THREATENING BLOOD CLOT. Remember to always use barrier methods for birth control and speak to your GYN about using two forms of birth control to start 31 days after surgery. It is very important to avoid pregnancy until at least 18-24 months after surgery. INCISION CARE  You may shower and get incisions wet 2 days after surgery. No soaking tub baths or swimming for 30 days after surgery. Keep abdominal area and incisions clean. Use soap and water to create a good lather and rinse off. Do not scrub incisions. If you have a drain, empty the drain as the nurses instructed. FOLLOW-UP APPOINTMENT should be made for one week after discharge. Call surgeon’s office at [phone] to schedule an appointment.     CALL YOUR DOCTOR FOR:  pain not controlled by pain medications, a temperature greater than 101.5° F, any increase or change in drainage or redness from any incision, any vomiting or inability to keep liquids down, shortness of breath, shoulder pain, or bleeding

## 2023-07-12 NOTE — DISCHARGE INSTRUCTIONS
your medications from [address] in [facility] or cut your pills and open capsules, mix with liquid to drink. Take Tylenol every 8 hours around the clock, unless instructed otherwise  Take your omeprazole daily  It is important to stay hydrated and follow your discharge diet progression   Mild nausea is ok as long as you can drink fluids, sip very slowly and get up and walk during any periods of nausea  You may shower normally after 48 hours, but do not scrub incision sites, blot gently with clean towel to dry incisions  Take home medications as usual unless instructed otherwise while in hospital  Follow up with Dr. Ho Sheikh and your PCP within the next week  Sleeve gastrectomy patients ONLY: Complete full course of lovenox injections!

## 2023-07-12 NOTE — DISCHARGE SUMMARY
Discharge Summary - Scott Urbina 29 y.o. female MRN: [id number]    Unit/Bed#: E5 -01 Encounter: [id number]      Pre-Operative Diagnosis: Pre-Op Diagnosis Codes:     * Obesity, unspecified [E66.9]     * Polycystic ovaries [E28.2]     * Gastroesophageal reflux disease, unspecified whether esophagitis present [K21.9]    Post-Operative Diagnosis: Post-Op Diagnosis Codes:     * Obesity, unspecified [E66.9]     * Polycystic ovaries [E28.2]     * Gastroesophageal reflux disease, unspecified whether esophagitis present [K21.9]    Procedures Performed:  Procedure(s):  GASTRECTOMY  SLEEVE W/ ROBOT    Surgeon: Day Hidalgo MD    See H & P for full details of admission and Operative Note for full details of operations performed. Patient tolerated surgery well without complications. In the morning postoperative Day 1, the patient had mild nausea and abdominal pain. Tolerated a clear liquid diet without vomiting. Able to ambulate and voiding independently. Patient was deemed ready for discharge home on lovenox. Patient was seen and examined prior to discharge. Provisions for Follow-Up Care:  See After Visit Summary/Discharge Instructions for information related to follow-up care and home orders. Disposition: Home, in stable condition. Planned Readmission: No    Discharge Medications:  See After Visit Summary/Discharge Instructions for reconciled discharge medications provided to patient and family. Post Operative instructions: Reviewed with patient and/or family.     Signature:   Josefina Mccoy PA-C  Date: 7/12/2023 Time: 10:19 AM

## 2023-07-12 NOTE — PROGRESS NOTES
Progress Note - Bariatric Surgery   Klaudia Carvajal 29 y.o. female MRN: [id number]  Unit/Bed#: E5 -01 Encounter: [id number]      Subjective/Objective     Subjective:  Patient was seen and evaluated this morning at bedside. Patient is POD1 s/p Robotic Sleeve Gastrectomy. Patient denies fevers, chills, sweats, SOB, CP, calf pain. Pain adequately controlled on oral pain medication. Ambulating without assistance, voiding well, and using incentive spirometer. Tolerating liquid diet without nausea or vomiting today. Objective:    /76 (BP Location: Right arm)   Pulse 72   Temp 97.9 °F (36.6 °C) (Oral)   Resp 19   Ht 5' 6.5" (1.689 m)   Wt 105 kg (230 lb 13.2 oz)   SpO2 99%   BMI 36.70 kg/m²       Intake/Output Summary (Last 24 hours) at 7/12/2023 0924  Last data filed at 7/12/2023 0836  Gross per 24 hour   Intake 420 ml   Output --   Net 420 ml       Invasive Devices     Peripheral Intravenous Line  Duration           Peripheral IV 07/11/23 Left;Ventral (anterior) Forearm 1 day                ROS: 10-point system completed. All negative except see HPI.     Physical Exam    General Appearance:    Alert, cooperative, no distress, appears stated age   Head:    Normocephalic, without obvious abnormality, atraumatic   Lungs:     Respirations unlabored   Heart:    Regular rate and rhythm   Abdomen:     Soft, appropriate tenderness, no masses, no organomegaly, non-distended   Extremities:   Extremities normal, atraumatic, no cyanosis or edema   Neurologic:  Incision:  Psych:   Normal strength and sensation    Clean, dry, and intact    Normal mood and affect       Lab, Imaging and other studies:  CBC:   Lab Results   Component Value Date    WBC 13.70 (H) 07/12/2023    HGB 14.1 07/12/2023    HCT 42.3 07/12/2023    MCV 93 07/12/2023     07/12/2023    RBC 4.57 07/12/2023    MCH 30.9 07/12/2023    MCHC 33.3 07/12/2023    RDW 12.0 07/12/2023    MPV 10.6 07/12/2023   , CMP:   Lab Results   Component Value Date    SODIUM 139 07/12/2023    K 3.4 (L) 07/12/2023     07/12/2023    CO2 23 07/12/2023    BUN 4 (L) 07/12/2023    CREATININE 0.56 (L) 07/12/2023    CALCIUM 9.3 07/12/2023    EGFR 123 07/12/2023        VTE Mechanical Prophylaxis: sequential compression device    Assessment/Plan  1)  Patient with Morbid Obesity s/p Robotic Sleeve Gastrectomy with stable post op course. Patient afebrile and hemodynamically stable. - Encourage PO fluids  - Recommend ambulation, use of SCDs when not ambulating, and incentive spirometry  - May give Lovenox   - Multimodal pain control  - Plan to D/C patient home today pending anticipated progression    Plan of care was discussed with patient.   Care plan discussed with Dr. Danilo Muniz DO  Bariatric Surgery Fellow  7/12/2023  9:24 AM

## 2023-07-12 NOTE — PLAN OF CARE
Problem: PAIN - ADULT  Goal: Verbalizes/displays adequate comfort level or baseline comfort level  Description: Interventions:  - Encourage patient to monitor pain and request assistance  - Assess pain using appropriate pain scale  - Administer analgesics based on type and severity of pain and evaluate response  - Implement non-pharmacological measures as appropriate and evaluate response  - Consider cultural and social influences on pain and pain management  - Notify physician/advanced practitioner if interventions unsuccessful or patient reports new pain  Outcome: Progressing     Problem: INFECTION - ADULT  Goal: Absence or prevention of progression during hospitalization  Description: INTERVENTIONS:  - Assess and monitor for signs and symptoms of infection  - Monitor lab/diagnostic results  - Monitor all insertion sites, i.e. indwelling lines, tubes, and drains  - Monitor endotracheal if appropriate and nasal secretions for changes in amount and color  - [place] appropriate cooling/warming therapies per order  - Administer medications as ordered  - Instruct and encourage patient and family to use good hand hygiene technique  - Identify and instruct in appropriate isolation precautions for identified infection/condition  Outcome: Progressing  Goal: Absence of fever/infection during neutropenic period  Description: INTERVENTIONS:  - Monitor WBC    Outcome: Progressing     Problem: SAFETY ADULT  Goal: Patient will remain free of falls  Description: INTERVENTIONS:  - Educate patient/family on patient safety including physical limitations  - Instruct patient to call for assistance with activity   - Consult OT/PT to assist with strengthening/mobility   - Keep Call bell within reach  - Keep bed low and locked with side rails adjusted as appropriate  - Keep care items and personal belongings within reach  - Initiate and maintain comfort rounds  - Make Fall Risk Sign visible to staff  - Offer Toileting every 2 Hours, in advance of need  - Initiate/Maintain alarm  - Obtain necessary fall risk management equipment:   - Apply yellow socks and bracelet for high fall risk patients  - Consider moving patient to room near nurses station  Outcome: Progressing  Goal: Maintain or return to baseline ADL function  Description: INTERVENTIONS:  -  Assess patient's ability to carry out ADLs; assess patient's baseline for ADL function and identify physical deficits which impact ability to perform ADLs (bathing, care of mouth/teeth, toileting, grooming, dressing, etc.)  - Assess/evaluate cause of self-care deficits   - Assess range of motion  - Assess patient's mobility; develop plan if impaired  - Assess patient's need for assistive devices and provide as appropriate  - Encourage maximum independence but intervene and supervise when necessary  - Involve family in performance of ADLs  - Assess for home care needs following discharge   - Consider OT consult to assist with ADL evaluation and planning for discharge  - Provide patient education as appropriate  Outcome: Progressing  Goal: Maintains/Returns to pre admission functional level  Description: INTERVENTIONS:  - Perform BMAT or MOVE assessment daily.   - Set and communicate daily mobility goal to care team and patient/family/caregiver. - Collaborate with rehabilitation services on mobility goals if consulted  - Perform Range of Motion 3 times a day. - Reposition patient every 2 hours.   - Dangle patient 3 times a day  - Stand patient 3 times a day  - Ambulate patient 3 times a day  - Out of bed to chair 3 times a day   - Out of bed for meals 3 times a day  - Out of bed for toileting  - Record patient progress and toleration of activity level   Outcome: Progressing     Problem: DISCHARGE PLANNING  Goal: Discharge to home or other facility with appropriate resources  Description: INTERVENTIONS:  - Identify barriers to discharge w/patient and caregiver  - Arrange for needed discharge resources and transportation as appropriate  - Identify discharge learning needs (meds, wound care, etc.)  - Arrange for interpretive services to assist at discharge as needed  - Refer to Case Management Department for coordinating discharge planning if the patient needs post-hospital services based on physician/advanced practitioner order or complex needs related to functional status, cognitive ability, or social support system  Outcome: Progressing     Problem: Knowledge Deficit  Goal: Patient/family/caregiver demonstrates understanding of disease process, treatment plan, medications, and discharge instructions  Description: Complete learning assessment and assess knowledge base.   Interventions:  - Provide teaching at level of understanding  - Provide teaching via preferred learning methods  Outcome: Progressing

## 2023-07-13 ENCOUNTER — TELEPHONE (OUTPATIENT)
Dept: MEDSURG UNIT | Facility: HOSPITAL | Age: 33
End: 2023-07-13

## 2023-07-13 ENCOUNTER — TRANSITIONAL CARE MANAGEMENT (OUTPATIENT)
Dept: FAMILY MEDICINE CLINIC | Facility: CLINIC | Age: 33
End: 2023-07-13

## 2023-07-13 PROCEDURE — 88307 TISSUE EXAM BY PATHOLOGIST: CPT | Performed by: PATHOLOGY

## 2023-07-13 NOTE — TELEPHONE ENCOUNTER
Post op follow up phone call attempted. No answer obtained on listed phone number. Generic message left requesting call back with an update on progress.

## 2023-07-14 ENCOUNTER — TELEPHONE (OUTPATIENT)
Dept: MEDSURG UNIT | Facility: HOSPITAL | Age: 33
End: 2023-07-14

## 2023-07-14 NOTE — TELEPHONE ENCOUNTER
Post op follow up phone call completed. Pt is sipping liquids but has only consumed about 5 oz so far today. Currently working on a protein shake. Had nausea and gas discomfort yesterday and had trouble hydrating but feels better today and tolerating liquids. Using IS as instructed, reinforced importance of using IS to help prevent pneumonia. Ambulating about home without difficulty. Pain controlled with analgesia. Passing gas, started miralax. Reaffirmed examples of liquid diet over the next week. Pt stated understanding about discharge instructions and medication adjustments. Tolerating self administration of Lovenox injections without difficulty. Follow up appt with surgeon scheduled for next week. Instructed to call with any additional questions or concerns.

## 2023-07-18 ENCOUNTER — OFFICE VISIT (OUTPATIENT)
Dept: FAMILY MEDICINE CLINIC | Facility: CLINIC | Age: 33
End: 2023-07-18
Payer: COMMERCIAL

## 2023-07-18 VITALS
DIASTOLIC BLOOD PRESSURE: 80 MMHG | HEART RATE: 85 BPM | WEIGHT: 221 LBS | BODY MASS INDEX: 34.69 KG/M2 | HEIGHT: 67 IN | OXYGEN SATURATION: 98 % | SYSTOLIC BLOOD PRESSURE: 118 MMHG | TEMPERATURE: 98.5 F

## 2023-07-18 DIAGNOSIS — K21.9 GASTROESOPHAGEAL REFLUX DISEASE WITHOUT ESOPHAGITIS: ICD-10-CM

## 2023-07-18 DIAGNOSIS — Z90.3 S/P GASTRIC SLEEVE PROCEDURE: Primary | ICD-10-CM

## 2023-07-18 DIAGNOSIS — E28.2 PCOS (POLYCYSTIC OVARIAN SYNDROME): ICD-10-CM

## 2023-07-18 DIAGNOSIS — Z09 HOSPITAL DISCHARGE FOLLOW-UP: ICD-10-CM

## 2023-07-18 DIAGNOSIS — E66.01 MORBID OBESITY (HCC): ICD-10-CM

## 2023-07-18 PROCEDURE — 99495 TRANSJ CARE MGMT MOD F2F 14D: CPT | Performed by: NURSE PRACTITIONER

## 2023-07-18 RX ORDER — ACETAMINOPHEN 160 MG/5ML
LIQUID ORAL
COMMUNITY
Start: 2023-07-12
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
## 2023-07-21 ENCOUNTER — OFFICE VISIT (OUTPATIENT)
Dept: BARIATRICS | Facility: CLINIC | Age: 33
End: 2023-07-21

## 2023-07-21 VITALS
HEART RATE: 98 BPM | WEIGHT: 220.5 LBS | DIASTOLIC BLOOD PRESSURE: 80 MMHG | SYSTOLIC BLOOD PRESSURE: 118 MMHG | BODY MASS INDEX: 34.61 KG/M2 | TEMPERATURE: 98.7 F | HEIGHT: 67 IN

## 2023-07-21 DIAGNOSIS — Z98.84 BARIATRIC SURGERY STATUS: Primary | ICD-10-CM

## 2023-07-21 DIAGNOSIS — E66.9 OBESITY, CLASS II, BMI 35-39.9: Primary | ICD-10-CM

## 2023-07-21 PROCEDURE — RECHECK: Performed by: DIETITIAN, REGISTERED

## 2023-07-21 PROCEDURE — 99024 POSTOP FOLLOW-UP VISIT: CPT | Performed by: SURGERY

## 2023-07-21 NOTE — PROGRESS NOTES
Weight Management Nutrition Note      Bariatric Surgeon: Dr. Aneesh Johnston    Surgery: Vertical Sleeve Gastrectomy    Class: first post op note    Topics discussed today include:     fluid goals post op, protein goals post op, constipation, chew food well, exercise, diet progression, protein supplems, vitamin/mineral supplements, calcium supplements, additional vitamin B12 and iron supplements    Patient was able to verbalize basic diet (protein, fluid, vitamin and mineral) recommendations and possible nutrition-related complications.  Yes

## 2023-07-21 NOTE — PROGRESS NOTES
POST OP UP VISIT - BARIATRIC SURGERY  Veronika Carvajal 29 y.o. female MRN: [id number]  Unit/Bed#:  Encounter: [id number]      HPI:  Flavia Villela is a 29 y.o. female status post Robotic sleeve gastrectomy performed by Dr. Glo Huggins on 7/11/23 returning to office for first post op visit since surgery. Tolerating diet. Denies nausea and vomiting. Taking multivitamins and PPI daily. Administering lovenox injections. Review of Systems   Constitutional: Negative for chills and fever. HENT: Negative for ear pain and sore throat. Eyes: Negative for pain and visual disturbance. Respiratory: Negative for cough and shortness of breath. Cardiovascular: Negative for chest pain and palpitations. Gastrointestinal: Negative for abdominal pain and vomiting. Genitourinary: Negative for dysuria and hematuria. Musculoskeletal: Negative for arthralgias and back pain. Skin: Negative for color change and rash. Neurological: Negative for seizures and syncope. All other systems reviewed and are negative. Historical Information   Past Medical History:   Diagnosis Date   • Abnormal Pap smear of cervix    • Allergic rhinitis    • Bartholin cyst    • COVID-19 12/26/2022    mild s/s, fully vaccinated   • GERD (gastroesophageal reflux disease)    • HPV in female    • Obesity    • PCOS (polycystic ovarian syndrome) 11/2019   • Polycystic ovary syndrome    • Tinnitus     ringing in ears   • Varicella    • Wears glasses      Past Surgical History:   Procedure Laterality Date   • EXAMINATION UNDER ANESTHESIA N/A 09/18/2020    Procedure: EXAM UNDER ANESTHESIA (EUA);   Surgeon: Adalgisa Sellers MD;  Location: [facility];  Service: Gynecology   • MYRINGOTOMY Bilateral 1996   • MO CAYDENS Renato Diaz Dr RSTRICTIV [address] [place] [address] LONGITUDINAL GASTRECTOMY N/A 7/11/2023    Procedure: GASTRECTOMY  SLEEVE W/ ROBOT;  Surgeon: Joselo Hugo MD;  Location: [facility];  Service: Bariatrics   • MO MARSUPIALIZATION BARTHOLINS GLAND CYST N/A 09/18/2020    Procedure: Bonnye Hawking CYST;  Surgeon: Radha Avery MD;  Location: [facility];  Service: Gynecology   • WISDOM TOOTH EXTRACTION       Social History   Social History     Substance and Sexual Activity   Alcohol Use Not Currently   • Alcohol/week: 1.0 standard drink of alcohol   • Types: 1 Glasses of wine per week    Comment: 1 glass weekly     Social History     Substance and Sexual Activity   Drug Use Never     Social History     Tobacco Use   Smoking Status Never   • Passive exposure: Past   Smokeless Tobacco Never     Family History: non-contributory    Meds/Allergies   all medications and allergies reviewed  Allergies   Allergen Reactions   • Shrimp Extract Allergy Skin Test - Food Allergy Hives   • Other Other (See Comments)     Environmental       Objective       Current Vitals:   Blood Pressure: 118/80 (07/21/23 0837)  Pulse: 98 (07/21/23 0837)  Temperature: 98.7 °F (37.1 °C) (07/21/23 0837)  Height: 5' 6.5" (168.9 cm) (07/21/23 0837)  Weight - Scale: 100 kg (220 lb 8 oz) (07/21/23 0837)      Invasive Devices     Peripheral Intravenous Line  Duration           Peripheral IV 07/11/23 Left;Ventral (anterior) Forearm 10 days                Physical Exam  Constitutional:       Appearance: Normal appearance. She is obese. HENT:      Head: Normocephalic and atraumatic. Nose: Nose normal.      Mouth/Throat:      Mouth: Mucous membranes are moist.   Eyes:      Extraocular Movements: Extraocular movements intact. Pupils: Pupils are equal, round, and reactive to light. Cardiovascular:      Rate and Rhythm: Normal rate and regular rhythm. Pulses: Normal pulses. Heart sounds: Normal heart sounds. Pulmonary:      Effort: Pulmonary effort is normal.      Breath sounds: Normal breath sounds. Abdominal:      Palpations: Abdomen is soft.       Comments: Incisions healing well with no signs of infection or drainage   Musculoskeletal:      Cervical back: Normal range of motion. Skin:     General: Skin is warm. Neurological:      General: No focal deficit present. Mental Status: She is alert and oriented to person, place, and time. Psychiatric:         Mood and Affect: Mood normal.         Behavior: Behavior normal.         Assessment/PLAN:    29 y.o. female status post Robotic sleeve gastrectomy done on 7/11/23 by Dr. Zach Silva, doing well post op. No major issues and healing well. The pathology report was reviewed with the patient and the results were within normal limits. Pathology, and Other Studies: I have personally reviewed pertinent reports. Lab Results   Component Value Date    FINALDX  07/11/2023     A. Stomach, sleeve gastrectomy:  -Segment of stomach with fundic gland polyps. Increase physical activity slowly as tolerated and instructed. Advance diet as instructed by our dietitians today and as indicated in the binder. Continue Lovenox prophylaxis until completed. Continue PPI    Follow up in one month as scheduled.         Jordan Gipson PA-C  Bariatric Surgery   7/21/2023  9:25 AM      .

## 2023-07-24 DIAGNOSIS — E66.9 OBESITY, CLASS II, BMI 35-39.9: ICD-10-CM

## 2023-07-24 RX ORDER — OMEPRAZOLE 20 MG/1
CAPSULE, DELAYED RELEASE ORAL
Qty: 90 CAPSULE | Refills: 2 | Status: SHIPPED | OUTPATIENT
Start: 2023-07-24

## 2023-08-23 ENCOUNTER — CLINICAL SUPPORT (OUTPATIENT)
Dept: BARIATRICS | Facility: CLINIC | Age: 33
End: 2023-08-23

## 2023-08-23 ENCOUNTER — TELEPHONE (OUTPATIENT)
Dept: BARIATRICS | Facility: CLINIC | Age: 33
End: 2023-08-23

## 2023-08-23 VITALS — BODY MASS INDEX: 33.59 KG/M2 | HEIGHT: 67 IN | WEIGHT: 214 LBS

## 2023-08-23 DIAGNOSIS — K91.2 POSTSURGICAL MALABSORPTION: Primary | ICD-10-CM

## 2023-08-23 PROCEDURE — RECHECK

## 2023-08-23 NOTE — PROGRESS NOTES
Weight Management Nutrition Class     Diagnosis: Obesity    Bariatric Surgeon: Dr. Stella Granados    Surgery: Vertical Sleeve Gastrectomy    Class: 5 week post op     Topics discussed today include:     fluid goals post op, protein goals post op, constipation, chew food well, exercise, avoidance of alcohol, PPI use, diet progression, hypoglycemia, dumping syndrome, protein supplems, vitamin/mineral supplements and calcium supplements    Patient was able to verbalize basic diet (protein, fluid, vitamin and mineral) recommendations and possible nutrition-related complications.  Yes

## 2023-08-23 NOTE — TELEPHONE ENCOUNTER
Reached out to Pt re: global class. Left VM for Pt explaining the link for the class was sent to her email and to check spam folder. Requested Pt contact office with any questions.

## 2023-10-20 ENCOUNTER — OFFICE VISIT (OUTPATIENT)
Dept: BARIATRICS | Facility: CLINIC | Age: 33
End: 2023-10-20

## 2023-10-20 VITALS
BODY MASS INDEX: 31.94 KG/M2 | HEART RATE: 76 BPM | SYSTOLIC BLOOD PRESSURE: 106 MMHG | WEIGHT: 203.5 LBS | DIASTOLIC BLOOD PRESSURE: 70 MMHG | TEMPERATURE: 97.5 F | HEIGHT: 67 IN

## 2023-10-20 DIAGNOSIS — Z98.84 BARIATRIC SURGERY STATUS: ICD-10-CM

## 2023-10-20 DIAGNOSIS — Z48.815 ENCOUNTER FOR SURGICAL AFTERCARE FOLLOWING SURGERY OF DIGESTIVE SYSTEM: Primary | ICD-10-CM

## 2023-10-20 DIAGNOSIS — K91.2 POSTSURGICAL MALABSORPTION: ICD-10-CM

## 2023-10-20 DIAGNOSIS — K21.9 GERD (GASTROESOPHAGEAL REFLUX DISEASE): ICD-10-CM

## 2023-10-20 DIAGNOSIS — E66.9 OBESITY, CLASS II, BMI 35-39.9: ICD-10-CM

## 2023-10-20 DIAGNOSIS — E66.9 OBESITY, CLASS I, BMI 30-34.9: ICD-10-CM

## 2023-10-20 RX ORDER — OMEPRAZOLE 20 MG/1
20 CAPSULE, DELAYED RELEASE ORAL DAILY
Qty: 90 CAPSULE | Refills: 1 | Status: SHIPPED | OUTPATIENT
Start: 2023-10-20

## 2023-10-20 RX ORDER — PHENOL 1.4 %
600 AEROSOL, SPRAY (ML) MUCOUS MEMBRANE 2 TIMES DAILY WITH MEALS
COMMUNITY

## 2023-10-20 RX ORDER — MULTIVITAMIN
1 TABLET ORAL DAILY
COMMUNITY

## 2023-10-20 NOTE — PROGRESS NOTES
Assessment/Plan:     Patient ID: Wesley Leggett is a 29 y.o. female. Bariatric Surgery Status/GERD    -s/p Vertical Sleeve Gastrectomy with Dr. Diogo Love on 07/11/2023. Presents to the office today for 2nd post op visit. Overall doing well, tolerating a regular diet. Denies having any abdominal pain, N/V/D/C, regurgitation, reflux or dysphagia. Taking her MVI and PPI daily. Has a long standing hx of GERD - fearful of coming off omeprazole. Hasn't had any heartburn episode since surgery. PLAN:     - continue with omeprazole for now. Will plan to taper off at next visit. - Routine follow up in 3 months for 3rd post op visit. - Continue with healthy lifestyle, adequate protein intake of 60 gm, fluid intake of at least 64 oz. - Continue with MVI daily. - Activity as tolerated. - Labs ordered and will adjust accordingly if any deficiency. - Follow up with RD and SW as needed. Continued/Maintain healthy weight loss with good nutrition intakes. Adequate hydration with at least 64oz. fluid intake. Follow diet as discussed. Follow vitamin and mineral recommendations as reviewed with you. Exercise as tolerated. Colonoscopy referral made: N/A  Mammogram - N/A    Follow-up in 3 MONTHS for 3rd post op visit. . We kindly ask that your arrive 15 minutes before your scheduled appointment time with your provider to allow our staff to room you, get your vital signs and update your chart. Get lab work done. Please call the office if you need a script. It is recommended to check with your insurance BEFORE getting labs done to make sure they are covered by your policy. Call our office if you have any problems with abdominal pain especially associated with fever, chills, nausea, vomiting or any other concerns. All  Post-bariatric surgery patients should be aware that very small quantities of any alcohol can cause impairment and it is very possible not to feel the effect.  The effect can be in the system for several hours. It is also a stomach irritant. It is advised to AVOID alcohol, Nonsteroidal antiinflammatory drugs (NSAIDS) and nicotine of all forms . Any of these can cause stomach irritation/pain. Discussed the effects of alcohol on a bariatric patient and the increased impairment risk. Keep up the good work! Postsurgical Malabsorption   -At risk for malabsorption of vitamins/minerals secondary to malabsorption and restriction of intake from bariatric surgery  - Currently taking adequate postop bariatric surgery vitamin supplementation  -Next set of bariatric labs ordered for approximately 2 weeks  -Patient received education about the importance of adhering to a lifelong supplementation regimen to avoid vitamin/mineral deficiencies      Diagnoses and all orders for this visit:    Encounter for surgical aftercare following surgery of digestive system  -     Zinc; Future  -     Vitamin D 25 hydroxy; Future  -     Vitamin B12; Future  -     Vitamin B1, whole blood; Future  -     Vitamin A; Future  -     TIBC Panel (incl. Iron, TIBC, % Iron Saturation); Future  -     PTH, intact; Future  -     CBC and Platelet; Future  -     Comprehensive metabolic panel; Future  -     Ferritin; Future  -     Folate; Future    Bariatric surgery status  -     Zinc; Future  -     Vitamin D 25 hydroxy; Future  -     Vitamin B12; Future  -     Vitamin B1, whole blood; Future  -     Vitamin A; Future  -     TIBC Panel (incl. Iron, TIBC, % Iron Saturation); Future  -     PTH, intact; Future  -     CBC and Platelet; Future  -     Comprehensive metabolic panel; Future  -     Ferritin; Future  -     Folate; Future    Postsurgical malabsorption  -     Zinc; Future  -     Vitamin D 25 hydroxy; Future  -     Vitamin B12; Future  -     Vitamin B1, whole blood; Future  -     Vitamin A; Future  -     TIBC Panel (incl. Iron, TIBC, % Iron Saturation); Future  -     PTH, intact; Future  -     CBC and Platelet;  Future  - Comprehensive metabolic panel; Future  -     Ferritin; Future  -     Folate; Future    Obesity, Class I, BMI 30-34.9  -     Zinc; Future  -     Vitamin D 25 hydroxy; Future  -     Vitamin B12; Future  -     Vitamin B1, whole blood; Future  -     Vitamin A; Future  -     TIBC Panel (incl. Iron, TIBC, % Iron Saturation); Future  -     PTH, intact; Future  -     CBC and Platelet; Future  -     Comprehensive metabolic panel; Future  -     Ferritin; Future  -     Folate; Future    GERD (gastroesophageal reflux disease)  -     Zinc; Future  -     Vitamin D 25 hydroxy; Future  -     Vitamin B12; Future  -     Vitamin B1, whole blood; Future  -     Vitamin A; Future  -     TIBC Panel (incl. Iron, TIBC, % Iron Saturation); Future  -     PTH, intact; Future  -     CBC and Platelet; Future  -     Comprehensive metabolic panel; Future  -     Ferritin; Future  -     Folate; Future    Obesity, Class II, BMI 35-39.9  -     omeprazole (PriLOSEC) 20 mg delayed release capsule; Take 1 capsule (20 mg total) by mouth daily    Other orders  -     Multiple Vitamin (multivitamin) tablet; Take 1 tablet by mouth daily  -     calcium carbonate (OS-WILLEM) 600 MG tablet; Take 600 mg by mouth 2 (two) times a day with meals         Subjective:      Patient ID: Cristofer Zarate is a 29 y.o. female. -s/p Vertical Sleeve Gastrectomy with Dr. Irlanda Foster on 07/11/2023. Presents to the office today for 2nd post op visit. Overall doing well, tolerating a regular diet. Denies having any abdominal pain, N/V/D/C, regurgitation, reflux or dysphagia. Taking her MVI and PPI daily. Has a long standing hx of GERD - fearful of coming off omeprazole. Hasn't had any heartburn episode since surgery. Initial: 258 lbs  Current: 203.5 lbs  EWL: (Weight loss is ahead of schedule at this post surgical period.)  Emiliano: 202 lbs  Current BMI is Body mass index is 32.35 kg/m².     Tolerating a regular diet-yes  Eating at least 60 grams of protein per day-yes  Following 30/60 minute rule with liquids- 15/30 minute rule. Drinking at least 64 ounces of fluid per day-yes  Drinking carbonated beverages-no  Sufficient exercise-no  Using NSAIDs regularly-no  Using nicotine-no  Using alcohol- socially. Supplements: Multivitamins, Iron, and Calcium  (45 mg of iron)     EWL is 54%, which places the patient ahead of schedule for expected post surgical weight loss at this time. The following portions of the patient's history were reviewed and updated as appropriate: allergies, current medications, past family history, past medical history, past social history, past surgical history and problem list.    Review of Systems   Constitutional: Negative. Respiratory: Negative. Cardiovascular: Negative. Gastrointestinal: Negative. Musculoskeletal: Negative. Neurological: Negative. Psychiatric/Behavioral: Negative. Objective:    /70   Pulse 76   Temp 97.5 °F (36.4 °C) (Tympanic)   Ht 5' 6.5" (1.689 m)   Wt 92.3 kg (203 lb 8 oz)   BMI 32.35 kg/m²      Physical Exam  Vitals and nursing note reviewed. Constitutional:       Appearance: Normal appearance. Cardiovascular:      Rate and Rhythm: Normal rate and regular rhythm. Pulses: Normal pulses. Heart sounds: Normal heart sounds. Pulmonary:      Effort: Pulmonary effort is normal.      Breath sounds: Normal breath sounds. Abdominal:      General: Bowel sounds are normal.      Palpations: Abdomen is soft. Tenderness: There is no abdominal tenderness. Musculoskeletal:         General: Normal range of motion. Skin:     General: Skin is warm and dry. Neurological:      General: No focal deficit present. Mental Status: She is alert and oriented to person, place, and time. Psychiatric:         Mood and Affect: Mood normal.         Behavior: Behavior normal.         Thought Content:  Thought content normal.         Judgment: Judgment normal.

## 2023-11-07 ENCOUNTER — APPOINTMENT (OUTPATIENT)
Dept: LAB | Facility: CLINIC | Age: 33
End: 2023-11-07
Payer: COMMERCIAL

## 2023-11-07 ENCOUNTER — OFFICE VISIT (OUTPATIENT)
Dept: FAMILY MEDICINE CLINIC | Facility: CLINIC | Age: 33
End: 2023-11-07
Payer: COMMERCIAL

## 2023-11-07 VITALS
BODY MASS INDEX: 31.78 KG/M2 | DIASTOLIC BLOOD PRESSURE: 82 MMHG | HEART RATE: 76 BPM | OXYGEN SATURATION: 98 % | SYSTOLIC BLOOD PRESSURE: 122 MMHG | TEMPERATURE: 97.3 F | WEIGHT: 202.5 LBS | HEIGHT: 67 IN

## 2023-11-07 DIAGNOSIS — J06.9 ACUTE URI: ICD-10-CM

## 2023-11-07 DIAGNOSIS — Z98.84 BARIATRIC SURGERY STATUS: ICD-10-CM

## 2023-11-07 DIAGNOSIS — Z48.815 ENCOUNTER FOR SURGICAL AFTERCARE FOLLOWING SURGERY OF DIGESTIVE SYSTEM: ICD-10-CM

## 2023-11-07 DIAGNOSIS — R68.89 FLU-LIKE SYMPTOMS: Primary | ICD-10-CM

## 2023-11-07 DIAGNOSIS — E66.9 OBESITY, CLASS I, BMI 30-34.9: ICD-10-CM

## 2023-11-07 DIAGNOSIS — K21.9 GERD (GASTROESOPHAGEAL REFLUX DISEASE): ICD-10-CM

## 2023-11-07 DIAGNOSIS — K91.2 POSTSURGICAL MALABSORPTION: ICD-10-CM

## 2023-11-07 LAB
25(OH)D3 SERPL-MCNC: 52.6 NG/ML (ref 30–100)
ALBUMIN SERPL BCP-MCNC: 4.5 G/DL (ref 3.5–5)
ALP SERPL-CCNC: 48 U/L (ref 34–104)
ALT SERPL W P-5'-P-CCNC: 10 U/L (ref 7–52)
ANION GAP SERPL CALCULATED.3IONS-SCNC: 8 MMOL/L
AST SERPL W P-5'-P-CCNC: 13 U/L (ref 13–39)
BILIRUB SERPL-MCNC: 0.58 MG/DL (ref 0.2–1)
BUN SERPL-MCNC: 11 MG/DL (ref 5–25)
CALCIUM SERPL-MCNC: 9.6 MG/DL (ref 8.4–10.2)
CHLORIDE SERPL-SCNC: 106 MMOL/L (ref 96–108)
CO2 SERPL-SCNC: 25 MMOL/L (ref 21–32)
CREAT SERPL-MCNC: 0.68 MG/DL (ref 0.6–1.3)
ERYTHROCYTE [DISTWIDTH] IN BLOOD BY AUTOMATED COUNT: 11.6 % (ref 11.6–15.1)
FERRITIN SERPL-MCNC: 88 NG/ML (ref 11–307)
FOLATE SERPL-MCNC: >22.3 NG/ML
GFR SERPL CREATININE-BSD FRML MDRD: 116 ML/MIN/1.73SQ M
GLUCOSE P FAST SERPL-MCNC: 101 MG/DL (ref 65–99)
HCT VFR BLD AUTO: 43 % (ref 34.8–46.1)
HGB BLD-MCNC: 14.9 G/DL (ref 11.5–15.4)
IRON SATN MFR SERPL: 45 % (ref 15–50)
IRON SERPL-MCNC: 154 UG/DL (ref 50–212)
MCH RBC QN AUTO: 31.4 PG (ref 26.8–34.3)
MCHC RBC AUTO-ENTMCNC: 34.7 G/DL (ref 31.4–37.4)
MCV RBC AUTO: 91 FL (ref 82–98)
PLATELET # BLD AUTO: 188 THOUSANDS/UL (ref 149–390)
PMV BLD AUTO: 10.5 FL (ref 8.9–12.7)
POTASSIUM SERPL-SCNC: 4.2 MMOL/L (ref 3.5–5.3)
PROT SERPL-MCNC: 7.3 G/DL (ref 6.4–8.4)
PTH-INTACT SERPL-MCNC: 34.1 PG/ML (ref 12–88)
RBC # BLD AUTO: 4.74 MILLION/UL (ref 3.81–5.12)
SARS-COV-2 AG UPPER RESP QL IA: NEGATIVE
SODIUM SERPL-SCNC: 139 MMOL/L (ref 135–147)
TIBC SERPL-MCNC: 340 UG/DL (ref 250–450)
UIBC SERPL-MCNC: 186 UG/DL (ref 155–355)
VALID CONTROL: NORMAL
VIT B12 SERPL-MCNC: 454 PG/ML (ref 180–914)
WBC # BLD AUTO: 5.4 THOUSAND/UL (ref 4.31–10.16)

## 2023-11-07 PROCEDURE — 80053 COMPREHEN METABOLIC PANEL: CPT

## 2023-11-07 PROCEDURE — 36415 COLL VENOUS BLD VENIPUNCTURE: CPT

## 2023-11-07 PROCEDURE — 99214 OFFICE O/P EST MOD 30 MIN: CPT | Performed by: NURSE PRACTITIONER

## 2023-11-07 PROCEDURE — 82607 VITAMIN B-12: CPT

## 2023-11-07 PROCEDURE — 84630 ASSAY OF ZINC: CPT

## 2023-11-07 PROCEDURE — 84425 ASSAY OF VITAMIN B-1: CPT

## 2023-11-07 PROCEDURE — 85027 COMPLETE CBC AUTOMATED: CPT

## 2023-11-07 PROCEDURE — 82746 ASSAY OF FOLIC ACID SERUM: CPT

## 2023-11-07 PROCEDURE — 87811 SARS-COV-2 COVID19 W/OPTIC: CPT | Performed by: NURSE PRACTITIONER

## 2023-11-07 PROCEDURE — 83970 ASSAY OF PARATHORMONE: CPT

## 2023-11-07 PROCEDURE — 82728 ASSAY OF FERRITIN: CPT

## 2023-11-07 PROCEDURE — 82306 VITAMIN D 25 HYDROXY: CPT

## 2023-11-07 PROCEDURE — 83540 ASSAY OF IRON: CPT

## 2023-11-07 PROCEDURE — 83550 IRON BINDING TEST: CPT

## 2023-11-07 PROCEDURE — 84590 ASSAY OF VITAMIN A: CPT

## 2023-11-07 RX ORDER — AMOXICILLIN AND CLAVULANATE POTASSIUM 875; 125 MG/1; MG/1
1 TABLET, FILM COATED ORAL EVERY 12 HOURS SCHEDULED
Qty: 14 TABLET | Refills: 0 | Status: SHIPPED | OUTPATIENT
Start: 2023-11-07 | End: 2023-11-14

## 2023-11-07 NOTE — PROGRESS NOTES
Assessment/Plan:    No problem-specific Assessment & Plan notes found for this encounter. Diagnoses and all orders for this visit:    Flu-like symptoms  -     POCT Rapid Covid Ag    Acute URI  -     amoxicillin-clavulanate (AUGMENTIN) 875-125 mg per tablet; Take 1 tablet by mouth every 12 (twelve) hours for 7 days        #1 Flu-like symptoms  A rapid COVID-19 test performed with negative results  #2 Acute URI  Discussed with patient plan to treat with 7 day course of amoxicillin  Patient instructed to call if no improvement in 72 hours or symptoms worsen    Subjective:      Patient ID: Sarah Espinoza is a 29 y.o. female. 29 y. o.female presenting with post nasal drip, pain with breathing inspiration, chest tightness and fatigue for the past three days. The pain inspiration as improved/resolved. Her niece was recently diagnosed with strep and she was spending time with her over the weekend. She has tried over the counter medications without relief. The following portions of the patient's history were reviewed and updated as appropriate: allergies, current medications, past family history, past medical history, past social history, past surgical history, and problem list.    Review of Systems   Constitutional:  Positive for fatigue. Negative for chills and fever. HENT:  Positive for congestion, postnasal drip and sore throat. Negative for ear pain, rhinorrhea, sinus pressure, sinus pain and sneezing. Respiratory:  Positive for cough and chest tightness. Negative for shortness of breath and wheezing. Cardiovascular: Negative. Gastrointestinal:  Negative for abdominal distention, abdominal pain, diarrhea and nausea. Musculoskeletal:  Positive for myalgias. Skin: Negative. Neurological:  Positive for headaches. Negative for dizziness and light-headedness. Psychiatric/Behavioral: Negative.            Objective:    /82 (BP Location: Right arm, Patient Position: Sitting)   Pulse 76 Temp (!) 97.3 °F (36.3 °C)   Ht 5' 6.5" (1.689 m)   Wt 91.9 kg (202 lb 8 oz)   LMP 11/07/2023 (Exact Date) Comment: Spotting  SpO2 98%   BMI 32.19 kg/m² (Reviewed)       Physical Exam  Vitals reviewed. Constitutional:       General: She is not in acute distress. Appearance: She is well-developed and well-groomed. She is not ill-appearing. HENT:      Head: Normocephalic and atraumatic. Right Ear: Tympanic membrane, ear canal and external ear normal.      Left Ear: Tympanic membrane, ear canal and external ear normal.      Nose: Congestion present. Mouth/Throat:      Mouth: Mucous membranes are moist.      Pharynx: Oropharynx is clear. Posterior oropharyngeal erythema present. Eyes:      General: Lids are normal.      Extraocular Movements: Extraocular movements intact. Conjunctiva/sclera: Conjunctivae normal.      Pupils: Pupils are equal, round, and reactive to light. Neck:      Trachea: Trachea and phonation normal.   Cardiovascular:      Rate and Rhythm: Normal rate and regular rhythm. Heart sounds: Normal heart sounds. Pulmonary:      Effort: Pulmonary effort is normal. No respiratory distress. Breath sounds: Rhonchi present. No wheezing or rales. Abdominal:      General: Abdomen is flat. Bowel sounds are normal. There is no distension. Palpations: Abdomen is soft. Tenderness: There is no abdominal tenderness. Musculoskeletal:      Cervical back: Neck supple. Lymphadenopathy:      Cervical: No cervical adenopathy. Skin:     General: Skin is warm and dry. Capillary Refill: Capillary refill takes less than 2 seconds. Neurological:      Mental Status: She is alert and oriented to person, place, and time. Psychiatric:         Mood and Affect: Mood normal.         Behavior: Behavior normal. Behavior is cooperative.

## 2023-11-07 NOTE — PROGRESS NOTES
[address] [facility]    NAME: Morenita Garcia  AGE: 29 y.o. SEX: female  : 1990     DATE: 2023     Assessment and Plan:     Problem List Items Addressed This Visit    None  Visit Diagnoses     Annual physical exam    -  Primary    Flu-like symptoms        Relevant Orders    POCT Rapid Covid Ag (Completed)    Acute URI        Relevant Medications    amoxicillin-clavulanate (AUGMENTIN) 875-125 mg per tablet          Immunizations and preventive care screenings were discussed with patient today. Appropriate education was printed on patient's after visit summary. Counseling:  {Annual Physical; Counselin}      Depression Screening and Follow-up Plan: Patient was screened for depression during today's encounter. They screened negative with a PHQ-2 score of 0. No follow-ups on file. Chief Complaint:     Chief Complaint   Patient presents with   • Headache     PND, yesterday pain when breathing in, chest tightness, feeling drained,  started . • Physical Exam      History of Present Illness:     Adult Annual Physical   Patient here for a comprehensive physical exam. The patient reports {problems:46331}. Diet and Physical Activity  Diet/Nutrition: {annual physical; diet:99246813}. Exercise: {annual physical; exercise:78121215}.    Niece strep   COVID  Depression Screening  PHQ-2/9 Depression Screening    Little interest or pleasure in doing things: 0 - not at all  Feeling down, depressed, or hopeless: 0 - not at all  PHQ-2 Score: 0  PHQ-2 Interpretation: Negative depression screen       General Health  Sleep: {annual physical; sleep:2102}. Hearing: {annual physical; hearin}. Vision: {annual physical; vision:49330204}. Dental: {annual physical; dental:57950551}. /GYN Health  Last menstrual period: ***  Contraceptive method: {contraceptive options:92980}.   History of STDs?: {YES/NO:20200}. Advanced Care Planning  Do you have an advanced directive? {YES/NO:20200}  Do you have a durable medical power of ? {YES/NO:20200}     Review of Systems:     Review of Systems   Constitutional:  Positive for fatigue. Negative for chills and fever. HENT:  Positive for congestion, postnasal drip and sore throat. Negative for ear pain, sinus pressure, sinus pain and sneezing. Respiratory:  Positive for cough and chest tightness. Negative for shortness of breath and wheezing. Cardiovascular: Negative. Gastrointestinal:  Negative for abdominal distention, abdominal pain, diarrhea and nausea. Musculoskeletal:  Positive for myalgias. Neurological:  Positive for headaches. Negative for dizziness and numbness. Psychiatric/Behavioral: Negative. Past Medical History:     Past Medical History:   Diagnosis Date   • Abnormal Pap smear of cervix    • Allergic rhinitis    • Bartholin cyst    • COVID-19 12/26/2022    mild s/s, fully vaccinated   • GERD (gastroesophageal reflux disease)    • HPV in female    • Obesity    • PCOS (polycystic ovarian syndrome) 11/2019   • Polycystic ovary syndrome    • Tinnitus     ringing in ears   • Varicella    • Wears glasses       Past Surgical History:     Past Surgical History:   Procedure Laterality Date   • EXAMINATION UNDER ANESTHESIA N/A 09/18/2020    Procedure: EXAM UNDER ANESTHESIA (EUA);   Surgeon: Zuleyma Valenzuela MD;  Location: [facility];  Service: Gynecology   • MYRINGOTOMY Bilateral 1996   • RI LAPS [address] RSTRICTIV [address] [facility] LONGITUDINAL GASTRECTOMY N/A 7/11/2023    Procedure: GASTRECTOMY  SLEEVE W/ ROBOT;  Surgeon: Inge Rivas MD;  Location: [facility];  Service: Bariatrics   • RI MARSUPIALIZATION [address] CYST N/A 09/18/2020    Procedure: MARSUPILIZATION BARTHOLIN CYST;  Surgeon: Zuleyma Valenzuela MD;  Location:  MAIN OR;  Service: Gynecology   • WISDOM TOOTH EXTRACTION        Social History:     Social History Socioeconomic History   • Marital status: /Civil Union     Spouse name: None   • Number of children: None   • Years of education: None   • Highest education level: None   Occupational History   • None   Tobacco Use   • Smoking status: Never     Passive exposure: Past   • Smokeless tobacco: Never   Vaping Use   • Vaping Use: Never used   Substance and Sexual Activity   • Alcohol use: Not Currently     Alcohol/week: 1.0 standard drink of alcohol     Types: 1 Glasses of wine per week     Comment: 1 glass weekly   • Drug use: Never   • Sexual activity: Yes     Partners: Male     Birth control/protection: OCP   Other Topics Concern   • None   Social History Narrative   • None     Social Determinants of Health     Financial Resource Strain: Not on file   Food Insecurity: Not on file   Transportation Needs: Not on file   Physical Activity: Not on file   Stress: Not on file   Social Connections: Not on file   Intimate Partner Violence: Not on file   Housing Stability: Not on file      Family History:     Family History   Problem Relation Age of Onset   • Drug abuse Mother         suicide 2011   • Alcohol abuse Mother    • Depression Mother    • Early death Mother    • Mental illness Mother    • Substance Abuse Mother    • Hypertension Father    • Diabetes Father    • Asthma Father    • Gestational diabetes Sister         twin   • Asthma Sister    • Mental illness Sister    • Substance Abuse Sister    • No Known Problems Sister    • No Known Problems Brother    • No Known Problems Brother    • Diabetes Maternal Grandmother    • Heart failure Maternal Grandmother    • Diabetes Paternal Grandmother    • Diabetes Paternal Grandfather    • Heart failure Paternal Grandfather    • Coronary artery disease Family    • Diabetes Family    • Colon cancer Neg Hx    • Ovarian cancer Neg Hx    • Breast cancer Neg Hx       Current Medications:     Current Outpatient Medications   Medication Sig Dispense Refill   • amoxicillin-clavulanate (AUGMENTIN) 875-125 mg per tablet Take 1 tablet by mouth every 12 (twelve) hours for 7 days 14 tablet 0   • calcium carbonate (OS-WILLEM) 600 MG tablet Take 600 mg by mouth 2 (two) times a day with meals     • cetirizine (ZyrTEC) 10 mg tablet Take 10 mg by mouth daily as needed      • etonogestrel (NEXPLANON) subdermal implant 68 mg by Subdermal route once     • fluticasone (FLONASE) 50 mcg/act nasal spray 1 spray into each nostril daily 16 mL 4   • meclizine (ANTIVERT) 25 mg tablet Take 1 tablet (25 mg total) by mouth 3 (three) times a day as needed for dizziness 30 tablet 0   • Multiple Vitamin (multivitamin) tablet Take 1 tablet by mouth daily     • omeprazole (PriLOSEC) 20 mg delayed release capsule Take 1 capsule (20 mg total) by mouth daily 90 capsule 1     No current facility-administered medications for this visit. Allergies:      Allergies   Allergen Reactions   • Shrimp Extract Allergy Skin Test - Food Allergy Hives   • Other Other (See Comments)     Environmental      Physical Exam:     /82 (BP Location: Right arm, Patient Position: Sitting)   Pulse 76   Temp (!) 97.3 °F (36.3 °C)   Ht 5' 6.5" (1.689 m)   Wt 91.9 kg (202 lb 8 oz)   LMP 11/07/2023 (Exact Date) Comment: Spotting  SpO2 98%   BMI 32.19 kg/m²     Physical Exam     Trudi Whitmore, [address] [facility]

## 2023-11-09 ENCOUNTER — NURSE TRIAGE (OUTPATIENT)
Age: 33
End: 2023-11-09

## 2023-11-09 DIAGNOSIS — J06.9 ACUTE URI: Primary | ICD-10-CM

## 2023-11-09 RX ORDER — METHYLPREDNISOLONE 4 MG/1
TABLET ORAL
Qty: 21 EACH | Refills: 0 | Status: SHIPPED | OUTPATIENT
Start: 2023-11-09

## 2023-11-09 NOTE — TELEPHONE ENCOUNTER
Pt calling. She was evaluated 11/07/2023 and started her Augmentin that day. Pt stating that she is feeling worse. She has more head congestion and now she has b/l ear congestion with left ear pain. She also has developed a very sore throat. She states that she feels a little dizzy at times, but she did go to work this morning and is currently driving her car. Offered to make pt an appt, but she wasn't sure she wanted to come in since she just had been there. Please review and advise what Ranjana Alfredo would like pt to do. Reason for Disposition   Earache    Answer Assessment - Initial Assessment Questions  1. ONSET: "When did the nasal discharge start?"   Earlier this week  2. AMOUNT: "How much discharge is there?"       Congestion   3. COUGH: "Do you have a cough?" If yes, ask: "Describe the color of your sputum" (clear, white, yellow, green)      no  4. RESPIRATORY DISTRESS: "Describe your breathing."       I feel a little short of breath (pt is talking in complete sentences)  5. FEVER: "Do you have a fever?" If Yes, ask: "What is your temperature, how was it measured, and when did it start?"      No fever that I know of, but I had the chills last night   6. SEVERITY: "Overall, how bad are you feeling right now?" (e.g., doesn't interfere with normal activities, staying home from school/work, staying in bed)       I left work early   7. OTHER SYMPTOMS: "Do you have any other symptoms?" (e.g., sore throat, earache, wheezing, vomiting)      Sore throat, left ear congestion and pain, feeling a little dizzy  8.  PREGNANCY: "Is there any chance you are pregnant?" "When was your last menstrual period?"      no    Protocols used: Common Cold-ADULT-[place]

## 2023-11-11 LAB — VIT B1 BLD-SCNC: 109.3 NMOL/L (ref 66.5–200)

## 2023-11-13 ENCOUNTER — ANNUAL EXAM (OUTPATIENT)
Age: 33
End: 2023-11-13
Payer: COMMERCIAL

## 2023-11-13 VITALS
BODY MASS INDEX: 32.78 KG/M2 | WEIGHT: 204 LBS | DIASTOLIC BLOOD PRESSURE: 80 MMHG | SYSTOLIC BLOOD PRESSURE: 102 MMHG | HEIGHT: 66 IN

## 2023-11-13 DIAGNOSIS — T36.95XA ANTIBIOTIC-INDUCED YEAST INFECTION: ICD-10-CM

## 2023-11-13 DIAGNOSIS — Z01.419 ENCOUNTER FOR ANNUAL ROUTINE GYNECOLOGICAL EXAMINATION: Primary | ICD-10-CM

## 2023-11-13 DIAGNOSIS — N92.6 IRREGULAR MENSTRUAL BLEEDING: ICD-10-CM

## 2023-11-13 DIAGNOSIS — B37.9 ANTIBIOTIC-INDUCED YEAST INFECTION: ICD-10-CM

## 2023-11-13 LAB — VIT A SERPL-MCNC: 47.6 UG/DL (ref 18.9–57.3)

## 2023-11-13 PROCEDURE — 99395 PREV VISIT EST AGE 18-39: CPT | Performed by: OBSTETRICS & GYNECOLOGY

## 2023-11-13 RX ORDER — FLUCONAZOLE 150 MG/1
150 TABLET ORAL ONCE
Qty: 1 TABLET | Refills: 0 | Status: SHIPPED | OUTPATIENT
Start: 2023-11-13 | End: 2023-11-13

## 2023-11-13 NOTE — PROGRESS NOTES
Sherrie Aaron  1990      CC:  Yearly exam    S:  29 y.o. female here for yearly exam.  Gets very irregular bleeding with her Nexplanon. Gets some occasional itching at the nexplanon site. She denies vaginal discharge, pelvic pain. She does have some itching/symptoms of yeast - is finishing up augmentin. She has no urinary concerns, does not have incontinence. No bowel concerns. No breast concerns. Sexual activity: She is sexually active without pain, bleeding or dryness. She is  and monogamous. She is not interested in STD screening today. Contraception: She uses nexplanon for contraception. Last Pap: 10/19/22 - NILM, Neg HPV    We reviewed ASCCP guidelines for Pap testing today.      Family hx of breast cancer: no  Family hx of ovarian cancer: no  Family hx of colon cancer: no      Current Outpatient Medications:     amoxicillin-clavulanate (AUGMENTIN) 875-125 mg per tablet, Take 1 tablet by mouth every 12 (twelve) hours for 7 days, Disp: 14 tablet, Rfl: 0    calcium carbonate (OS-WILLEM) 600 MG tablet, Take 600 mg by mouth 2 (two) times a day with meals, Disp: , Rfl:     cetirizine (ZyrTEC) 10 mg tablet, Take 10 mg by mouth daily as needed , Disp: , Rfl:     etonogestrel (NEXPLANON) subdermal implant, 68 mg by Subdermal route once, Disp: , Rfl:     fluconazole (DIFLUCAN) 150 mg tablet, Take 1 tablet (150 mg total) by mouth once for 1 dose, Disp: 1 tablet, Rfl: 0    fluticasone (FLONASE) 50 mcg/act nasal spray, 1 spray into each nostril daily, Disp: 16 mL, Rfl: 4    meclizine (ANTIVERT) 25 mg tablet, Take 1 tablet (25 mg total) by mouth 3 (three) times a day as needed for dizziness, Disp: 30 tablet, Rfl: 0    methylPREDNISolone 4 MG tablet therapy pack, Use as directed on package, Disp: 21 each, Rfl: 0    Multiple Vitamin (multivitamin) tablet, Take 1 tablet by mouth daily, Disp: , Rfl:     omeprazole (PriLOSEC) 20 mg delayed release capsule, Take 1 capsule (20 mg total) by mouth daily, Disp: 90 capsule, Rfl: 1  Patient Active Problem List   Diagnosis    Morbid obesity ([address])    Irregular menstrual bleeding    Bartholin cyst    GERD (gastroesophageal reflux disease)    PCOS (polycystic ovarian syndrome)     Past Medical History:   Diagnosis Date    Abnormal Pap smear of cervix     Allergic rhinitis     Bartholin cyst     COVID-19 12/26/2022    mild s/s, fully vaccinated    GERD (gastroesophageal reflux disease)     HPV in female     Obesity     PCOS (polycystic ovarian syndrome) 11/2019    Polycystic ovary syndrome     Tinnitus     ringing in ears    Varicella     Wears glasses      Family History   Problem Relation Age of Onset    Drug abuse Mother         suicide 2011    Alcohol abuse Mother     Depression Mother     Early death Mother     Mental illness Mother     Substance Abuse Mother     Hypertension Father     Diabetes Father     Asthma Father     Gestational diabetes Sister         twin    Asthma Sister     Mental illness Sister     Substance Abuse Sister     No Known Problems Sister     No Known Problems Brother     No Known Problems Brother     Diabetes Maternal Grandmother     Heart failure Maternal Grandmother     Diabetes Paternal Grandmother     Diabetes Paternal Grandfather     Heart failure Paternal Grandfather     Coronary artery disease Family     Diabetes Family     Colon cancer Neg Hx     Ovarian cancer Neg Hx     Breast cancer Neg Hx         Review of Systems   Respiratory: Negative. Cardiovascular: Negative. Gastrointestinal: Negative for constipation and diarrhea. O:  Blood pressure 102/80, height 5' 5.5" (1.664 m), weight 92.5 kg (204 lb), last menstrual period 11/12/2023, not currently breastfeeding. Patient appears well and is not in distress  Breasts are symmetrical without mass, tenderness, nipple discharge, skin changes or adenopathy. Abdomen is soft and nontender without masses.    External genitals are normal without lesions or rashes. Urethral meatus and urethra are normal  Bladder is normal to palpation  Vagina is normal without discharge or bleeding. Cervix is normal without discharge or lesion. Uterus is normal, mobile, nontender without palpable mass. Adnexa are normal, nontender, without palpable mass. A:  Yearly exam.     P:   Pap & HPV up to date   Mammo age 37   She would like to continue to monitor her bleeding and give it some more time. If continues to be irregular can RTO for removal/discussion of other options. IUD suggested. RTO one year for yearly exam or sooner as needed.

## 2023-11-15 LAB — ZINC SERPL-MCNC: 94 UG/DL (ref 44–115)

## 2023-11-17 DIAGNOSIS — R73.01 ELEVATED FASTING GLUCOSE: Primary | ICD-10-CM

## 2023-11-20 ENCOUNTER — APPOINTMENT (OUTPATIENT)
Dept: LAB | Facility: AMBULARY SURGERY CENTER | Age: 33
End: 2023-11-20
Payer: COMMERCIAL

## 2023-11-20 DIAGNOSIS — R73.01 ELEVATED FASTING GLUCOSE: ICD-10-CM

## 2023-11-20 LAB
EST. AVERAGE GLUCOSE BLD GHB EST-MCNC: 108 MG/DL
HBA1C MFR BLD: 5.4 %

## 2023-11-20 PROCEDURE — 36415 COLL VENOUS BLD VENIPUNCTURE: CPT

## 2023-11-20 PROCEDURE — 83036 HEMOGLOBIN GLYCOSYLATED A1C: CPT

## 2024-01-15 ENCOUNTER — OFFICE VISIT (OUTPATIENT)
Dept: BARIATRICS | Facility: CLINIC | Age: 34
End: 2024-01-15
Payer: COMMERCIAL

## 2024-01-15 VITALS
DIASTOLIC BLOOD PRESSURE: 78 MMHG | HEIGHT: 67 IN | TEMPERATURE: 96.8 F | BODY MASS INDEX: 31 KG/M2 | WEIGHT: 197.5 LBS | SYSTOLIC BLOOD PRESSURE: 120 MMHG | HEART RATE: 72 BPM

## 2024-01-15 DIAGNOSIS — K21.9 GERD (GASTROESOPHAGEAL REFLUX DISEASE): ICD-10-CM

## 2024-01-15 DIAGNOSIS — K91.2 POSTSURGICAL MALABSORPTION: ICD-10-CM

## 2024-01-15 DIAGNOSIS — E66.9 OBESITY, CLASS I, BMI 30-34.9: ICD-10-CM

## 2024-01-15 DIAGNOSIS — Z98.84 BARIATRIC SURGERY STATUS: ICD-10-CM

## 2024-01-15 DIAGNOSIS — Z48.815 ENCOUNTER FOR SURGICAL AFTERCARE FOLLOWING SURGERY OF DIGESTIVE SYSTEM: Primary | ICD-10-CM

## 2024-01-15 PROCEDURE — 99214 OFFICE O/P EST MOD 30 MIN: CPT | Performed by: NURSE PRACTITIONER

## 2024-01-15 RX ORDER — FLUCONAZOLE 150 MG/1
TABLET ORAL
COMMUNITY
Start: 2023-11-17

## 2024-01-15 RX ORDER — OMEPRAZOLE 20 MG/1
20 CAPSULE, DELAYED RELEASE ORAL DAILY
Qty: 90 CAPSULE | Refills: 0 | Status: SHIPPED | OUTPATIENT
Start: 2024-01-15

## 2024-01-15 NOTE — PROGRESS NOTES
Assessment/Plan:     Patient ID: Elsa Carvajal is a 33 y.o. female.     Bariatric Surgery Status/GERD    -s/p Vertical Sleeve Gastrectomy with Dr. Chirag Pimentel on 07/11/2023. Presents to the office today for 3rd post op visit. Overall doing fair, she notices a slower weight loss since last visit. Not tracking caloric intake at this time. Started recently going to the gym. Denies having any abdominal pain, N/V/D/C, regurgitation, reflux or dysphagia. Taking her MVI and PPI daily. Fearful of coming off omeprazole but hasn't had any episodes even though she missed one or two doses.     PLAN:     - taper off omeprazole over the next 3 months. She will let us know if she is unable to do so.   - Routine follow up in 6 months for annual visit.   - Continue with healthy lifestyle, adequate protein intake of 60 gm, fluid intake of at least 64 oz.   - Continue with MVI daily.   - Activity as tolerated.   - Labs ordered and will adjust accordingly if any deficiency.   - Follow up with RD and SW as needed.        Continued/Maintain healthy weight loss with good nutrition intakes.  Adequate hydration with at least 64oz. fluid intake.  Follow diet as discussed.  Follow vitamin and mineral recommendations as reviewed with you.  Exercise as tolerated.    Colonoscopy referral made: n/a  Mammogram- n/a    Follow-up in 6 months for annual visit. We kindly ask that your arrive 15 minutes before your scheduled appointment time with your provider to allow our staff to room you, get your vital signs and update your chart.      Call our office if you have any problems with abdominal pain especially associated with fever, chills, nausea, vomiting or any other concerns.    All  Post-bariatric surgery patients should be aware that very small quantities of any alcohol can cause impairment and it is very possible not to feel the effect. The effect can be in the system for several hours.  It is also a stomach irritant.     It is advised to AVOID  alcohol, Nonsteroidal antiinflammatory drugs (NSAIDS) and nicotine of all forms . Any of these can cause stomach irritation/pain.    Discussed the effects of alcohol on a bariatric patient and the increased impairment risk.     Keep up the good work!     Postsurgical Malabsorption   -At risk for malabsorption of vitamins/minerals secondary to malabsorption and restriction of intake from bariatric surgery  -Currently taking adequate postop bariatric surgery vitamin supplementation  -Last set of bariatric labs completed on 11/08/2023 and showed WNL   -Patient received education about the importance of adhering to a lifelong supplementation regimen to avoid vitamin/mineral deficiencies      Diagnoses and all orders for this visit:    Encounter for surgical aftercare following surgery of digestive system    Bariatric surgery status  -     omeprazole (PriLOSEC) 20 mg delayed release capsule; Take 1 capsule (20 mg total) by mouth daily    Postsurgical malabsorption    Obesity, Class I, BMI 30-34.9    GERD (gastroesophageal reflux disease)    Other orders  -     fluconazole (DIFLUCAN) 150 mg tablet;  (Patient not taking: Reported on 1/15/2024)         Subjective:      Patient ID: Elsa Carvajal is a 33 y.o. female.    -s/p Vertical Sleeve Gastrectomy with Dr. Chirag Pimentel on 07/11/2023. Presents to the office today for 3rd post op visit    Initial: 258 lbs   Current: 197.5 lbs.   EWL: (Weight loss is ahead of schedule at this post surgical period.)  Emiliano: 195 LBS   Current BMI is Body mass index is 31.4 kg/m².    Tolerating a regular diet-yes  Eating at least 60 grams of protein per day-yes  Following 30/60 minute rule with liquids-yes - 30/30 minute rule.   Drinking at least 64 ounces of fluid per day-yes  Drinking carbonated beverages-yes  Sufficient exercise-yes - cardio   Using NSAIDs regularly-no  Using nicotine-no  Using alcohol-Socially.   Supplements:  Multivitamins, Iron, and Calcium  (45 mg of iron)     EWL is  "59%, which places the patient ahead of schedule for expected post surgical weight loss at this time.     The following portions of the patient's history were reviewed and updated as appropriate: allergies, current medications, past family history, past medical history, past social history, past surgical history and problem list.    Review of Systems   Constitutional: Negative.    Respiratory: Negative.     Cardiovascular: Negative.    Gastrointestinal: Negative.    Musculoskeletal: Negative.    Neurological: Negative.    Psychiatric/Behavioral: Negative.           Objective:    /78 (BP Location: Left arm, Patient Position: Sitting, Cuff Size: Standard)   Pulse 72   Temp (!) 96.8 °F (36 °C) (Temporal)   Ht 5' 6.5\" (1.689 m)   Wt 89.6 kg (197 lb 8 oz)   BMI 31.40 kg/m²      Physical Exam  Vitals and nursing note reviewed.   Constitutional:       Appearance: Normal appearance. She is obese.   Cardiovascular:      Rate and Rhythm: Normal rate and regular rhythm.      Pulses: Normal pulses.      Heart sounds: Normal heart sounds.   Pulmonary:      Effort: Pulmonary effort is normal.      Breath sounds: Normal breath sounds.   Abdominal:      General: Bowel sounds are normal.      Palpations: Abdomen is soft.      Tenderness: There is no abdominal tenderness.   Musculoskeletal:         General: Normal range of motion.   Skin:     General: Skin is warm and dry.   Neurological:      General: No focal deficit present.      Mental Status: She is alert and oriented to person, place, and time.   Psychiatric:         Mood and Affect: Mood normal.         Behavior: Behavior normal.         Thought Content: Thought content normal.         Judgment: Judgment normal.             "

## 2024-02-06 ENCOUNTER — OFFICE VISIT (OUTPATIENT)
Dept: AUDIOLOGY | Age: 34
End: 2024-02-06
Payer: COMMERCIAL

## 2024-02-06 DIAGNOSIS — R42 DIZZINESS AND GIDDINESS: Primary | ICD-10-CM

## 2024-02-06 DIAGNOSIS — R42 DIZZINESS: ICD-10-CM

## 2024-02-06 PROCEDURE — 92540 BASIC VESTIBULAR EVALUATION: CPT | Performed by: AUDIOLOGIST

## 2024-02-06 PROCEDURE — 92537 CALORIC VSTBLR TEST W/REC: CPT | Performed by: AUDIOLOGIST

## 2024-02-06 PROCEDURE — 92567 TYMPANOMETRY: CPT | Performed by: AUDIOLOGIST
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
## 2024-03-04 ENCOUNTER — EVALUATION (OUTPATIENT)
Dept: PHYSICAL THERAPY | Facility: CLINIC | Age: 34
End: 2024-03-04
Payer: COMMERCIAL

## 2024-03-04 DIAGNOSIS — H81.10 BPPV (BENIGN PAROXYSMAL POSITIONAL VERTIGO), UNSPECIFIED LATERALITY: ICD-10-CM

## 2024-03-04 DIAGNOSIS — M54.12 CERVICAL RADICULOPATHY: Primary | ICD-10-CM

## 2024-03-04 PROCEDURE — 97162 PT EVAL MOD COMPLEX 30 MIN: CPT | Performed by: PHYSICAL THERAPIST

## 2024-03-04 PROCEDURE — 97110 THERAPEUTIC EXERCISES: CPT | Performed by: PHYSICAL THERAPIST

## 2024-03-04 PROCEDURE — 97112 NEUROMUSCULAR REEDUCATION: CPT | Performed by: PHYSICAL THERAPIST
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
## 2024-03-13 ENCOUNTER — OFFICE VISIT (OUTPATIENT)
Dept: PHYSICAL THERAPY | Facility: CLINIC | Age: 34
End: 2024-03-13
Payer: COMMERCIAL

## 2024-03-13 DIAGNOSIS — M54.12 CERVICAL RADICULOPATHY: ICD-10-CM

## 2024-03-13 DIAGNOSIS — H81.10 BPPV (BENIGN PAROXYSMAL POSITIONAL VERTIGO), UNSPECIFIED LATERALITY: Primary | ICD-10-CM

## 2024-03-13 PROCEDURE — 97112 NEUROMUSCULAR REEDUCATION: CPT

## 2024-03-13 PROCEDURE — 97110 THERAPEUTIC EXERCISES: CPT

## 2024-03-13 NOTE — PROGRESS NOTES
"Daily Note     Today's date: 3/13/2024  Patient name: Elsa Carvajal  : 1990  MRN: [id number]  Referring provider: Mars Boyer PA*  Dx:   Encounter Diagnosis     ICD-10-CM    1. BPPV (benign paroxysmal positional vertigo), unspecified laterality  H81.10       2. Cervical radiculopathy  M54.12           Start Time: 1630  Stop Time: 1700  Total time in clinic (min): 30 minutes    Subjective: Pt reports that she only had one occurrence of dizziness last night when she was in bed, and had a lingering \"buzzing\" from her neck to her head. Resolves with rest.        Objective: See treatment diary below  UE MMT 5   C ROM-WNL pain free    Assessment: Tolerated treatment well. No production of symptoms throughout session. Patient demonstrated fatigue post treatment      Plan: Continue per plan of care.      Precautions: Nil      Manuals 3/                                                               Neuro Re-Ed             Education and progression 10 min            Bank Account education/sleeping position performed            Cut finger?             Perform HEP with Pain/Perform when dizzy performed 5 min            TB row/ext   2x10 GTB                                      Ther Ex             CS Retraction 10 x 5-10 D B 2x10            Progression?             CS ret+ext   2x10                                                                             Ther Activity             CS Range Ext Dizzy Better nil           Laying supine Dizzy better nil           CS Range L Rot/SB B nil           Other concerns?                                                                      "

## 2024-03-20 ENCOUNTER — APPOINTMENT (OUTPATIENT)
Dept: PHYSICAL THERAPY | Facility: CLINIC | Age: 34
End: 2024-03-20
Payer: COMMERCIAL

## 2024-03-22 DIAGNOSIS — Z98.84 BARIATRIC SURGERY STATUS: ICD-10-CM

## 2024-03-22 RX ORDER — OMEPRAZOLE 20 MG/1
20 CAPSULE, DELAYED RELEASE ORAL DAILY
Qty: 30 CAPSULE | Refills: 0 | Status: SHIPPED | OUTPATIENT
Start: 2024-03-22

## 2024-03-22 NOTE — TELEPHONE ENCOUNTER
Spoke to Pt she  stated  is trying to stay off for 2 days  and 3rd day take  Omeprazole  20 mg , but she needs refill thank you .

## 2024-03-25 ENCOUNTER — OFFICE VISIT (OUTPATIENT)
Dept: PHYSICAL THERAPY | Facility: CLINIC | Age: 34
End: 2024-03-25
Payer: COMMERCIAL

## 2024-03-25 DIAGNOSIS — H81.10 BPPV (BENIGN PAROXYSMAL POSITIONAL VERTIGO), UNSPECIFIED LATERALITY: Primary | ICD-10-CM

## 2024-03-25 DIAGNOSIS — M54.12 CERVICAL RADICULOPATHY: ICD-10-CM

## 2024-03-25 PROCEDURE — 97112 NEUROMUSCULAR REEDUCATION: CPT | Performed by: PHYSICAL THERAPIST

## 2024-03-25 PROCEDURE — 97110 THERAPEUTIC EXERCISES: CPT | Performed by: PHYSICAL THERAPIST

## 2024-03-25 NOTE — PROGRESS NOTES
"Daily Note     Today's date: 3/25/2024  Patient name: Elsa Carvajal  : 1990  MRN: [id number]  Referring provider: Mars Boyer PA*  Dx:   Encounter Diagnosis     ICD-10-CM    1. BPPV (benign paroxysmal positional vertigo), unspecified laterality  H81.10       2. Cervical radiculopathy  M54.12                      Subjective:  Pt presents today stating she has been symptom free for approximately 1 week, had a stomach virus has recovered, feeling.      Pt reports that she only had one occurrence of dizziness last night when she was in bed, and had a lingering \"buzzing\" from her neck to her head. Resolves with rest.        Objective: See treatment diary below  UE MMT 5   C ROM-WNL pain free min restriction in ext.      Assessment: Pt is progressing very well, asymptomatic, compliant with HEP and sitting posture.  RE n.v.     Plan: Continue per plan of care.      Precautions: Nil      Manuals 3/4 03/13 3/25                                                              Neuro Re-Ed             Education and progression 10 min            Bank Account education/sleeping position performed            Cut finger?             Perform HEP with Pain/Perform when dizzy performed 5 min            TB row/ext   2x10 GTB  3 x 10 GTB          Scaption    2 x 10                       Ther Ex             CS Retraction 10 x 5-10 D B 2x10  3 x 5          Progression?             CS ret+ext   2x10  3 x 5                                                                           Ther Activity             CS Range Ext Dizzy Better nil           Laying supine Dizzy better nil           CS Range L Rot/SB B nil           Other concerns?                                                                      "

## 2024-04-04 ENCOUNTER — EVALUATION (OUTPATIENT)
Dept: PHYSICAL THERAPY | Facility: CLINIC | Age: 34
End: 2024-04-04
Payer: COMMERCIAL

## 2024-04-04 DIAGNOSIS — B37.2 CUTANEOUS CANDIDIASIS: ICD-10-CM

## 2024-04-04 DIAGNOSIS — M54.12 CERVICAL RADICULOPATHY: ICD-10-CM

## 2024-04-04 DIAGNOSIS — H81.10 BPPV (BENIGN PAROXYSMAL POSITIONAL VERTIGO), UNSPECIFIED LATERALITY: Primary | ICD-10-CM

## 2024-04-04 DIAGNOSIS — Z98.84 BARIATRIC SURGERY STATUS: Primary | ICD-10-CM

## 2024-04-04 PROCEDURE — 97112 NEUROMUSCULAR REEDUCATION: CPT | Performed by: PHYSICAL THERAPIST

## 2024-04-04 RX ORDER — NYSTATIN 100000 [USP'U]/G
POWDER TOPICAL 4 TIMES DAILY
Qty: 60 G | Refills: 3 | Status: SHIPPED | OUTPATIENT
Start: 2024-04-04

## 2024-04-04 NOTE — PROGRESS NOTES
Daily Note     Today's date: 2024  Patient name: Elsa Carvajal  : 1990  MRN: [id number]  Referring provider: Mars Boyer PA*  Dx:   Encounter Diagnosis     ICD-10-CM    1. BPPV (benign paroxysmal positional vertigo), unspecified laterality  H81.10       2. Cervical radiculopathy  M54.12                      Subjective:  Pt presents today stating that she is asymptomatic for upwards of 3 weeks.  No pain, no headaches, no dizziness, feeling really well.  Performing all desired tasks without issue at this time.  Feeling as though she is ready for discharge.        Objective: See treatment diary below  UE MMT 5   C ROM-WNL pain free nil restriction in ext.      Assessment:  Pt at this time demonstrates improved range, strength, flexibility, tolerance to activity and is likely safe to DC to HEP.  If she is to have a decline in any form she is welcome to return as needed.  Thank you very much for this kind and motivated referral.           Precautions: Nil      Manuals 3/4 03/13 3/25 4/4                                                             Neuro Re-Ed             Education and progression 10 min   Assessment x 10 min         Bank Account education/sleeping position performed            Cut finger?             Perform HEP with Pain/Perform when dizzy performed 5 min            TB row/ext   2x10 GTB  3 x 10 GTB          Scaption    2 x 10                       Ther Ex             CS Retraction 10 x 5-10 D B 2x10  3 x 5          Progression?             CS ret+ext   2x10  3 x 5 review                                                                          Ther Activity             CS Range Ext Dizzy Better nil           Laying supine Dizzy better nil           CS Range L Rot/SB B nil           Other concerns?

## 2024-05-21 ENCOUNTER — PROCEDURE VISIT (OUTPATIENT)
Age: 34
End: 2024-05-21
Payer: COMMERCIAL

## 2024-05-21 VITALS
DIASTOLIC BLOOD PRESSURE: 76 MMHG | BODY MASS INDEX: 29.03 KG/M2 | SYSTOLIC BLOOD PRESSURE: 116 MMHG | WEIGHT: 185 LBS | HEIGHT: 67 IN

## 2024-05-21 DIAGNOSIS — Z30.011 OCP (ORAL CONTRACEPTIVE PILLS) INITIATION: ICD-10-CM

## 2024-05-21 DIAGNOSIS — Z30.46 NEXPLANON REMOVAL: Primary | ICD-10-CM

## 2024-05-21 PROCEDURE — 11982 REMOVE DRUG IMPLANT DEVICE: CPT | Performed by: OBSTETRICS & GYNECOLOGY

## 2024-05-21 RX ORDER — NORETHINDRONE ACETATE AND ETHINYL ESTRADIOL AND FERROUS FUMARATE 1MG-20(24)
1 KIT ORAL DAILY
Qty: 84 TABLET | Refills: 1 | Status: SHIPPED | OUTPATIENT
Start: 2024-05-21

## 2024-05-21 NOTE — PROGRESS NOTES
Universal Protocol:  Consent: Verbal consent obtained.  Risks and benefits: risks, benefits and alternatives were discussed  Consent given by: patient  Patient understanding: patient states understanding of the procedure being performed  Required items: required blood products, implants, devices, and special equipment available  Patient identity confirmed: verbally with patient  Remove and insert drug implant    Date/Time: 5/21/2024 9:30 AM    Performed by: Meghan Navarro MD  Authorized by: Meghan Navarro MD    Indication:     Indication: Presence of non-biodegradable drug delivery implant    Pre-procedure:     Prepped with: povidone-iodine      Local anesthetic:  Lidocaine with epinephrine  Procedure:     Procedure:  Removal    Small stab incision was made in arm: yes      Left/right:  Left    Site was closed with steri-strips and pressure bandage applied: yes    Comments:      Removed for persistent irregular bleeding.   Would like resume OCPs - has used in past without problem.  Sleeve is not a contraindication.    Follow up for yearly exam.

## 2024-07-16 ENCOUNTER — OFFICE VISIT (OUTPATIENT)
Dept: BARIATRICS | Facility: CLINIC | Age: 34
End: 2024-07-16
Payer: COMMERCIAL

## 2024-07-16 VITALS
DIASTOLIC BLOOD PRESSURE: 68 MMHG | HEART RATE: 71 BPM | BODY MASS INDEX: 29.17 KG/M2 | TEMPERATURE: 98.5 F | WEIGHT: 181.5 LBS | HEIGHT: 66 IN | SYSTOLIC BLOOD PRESSURE: 114 MMHG

## 2024-07-16 DIAGNOSIS — Z98.84 BARIATRIC SURGERY STATUS: ICD-10-CM

## 2024-07-16 DIAGNOSIS — K59.00 CONSTIPATION: ICD-10-CM

## 2024-07-16 DIAGNOSIS — K91.2 POSTSURGICAL MALABSORPTION: ICD-10-CM

## 2024-07-16 DIAGNOSIS — Z48.815 ENCOUNTER FOR SURGICAL AFTERCARE FOLLOWING SURGERY OF DIGESTIVE SYSTEM: Primary | ICD-10-CM

## 2024-07-16 PROCEDURE — 99214 OFFICE O/P EST MOD 30 MIN: CPT | Performed by: NURSE PRACTITIONER

## 2024-07-16 RX ORDER — DOCUSATE SODIUM 100 MG/1
100 CAPSULE, LIQUID FILLED ORAL 2 TIMES DAILY
Qty: 180 CAPSULE | Refills: 3 | Status: SHIPPED | OUTPATIENT
Start: 2024-07-16

## 2024-07-16 NOTE — PROGRESS NOTES
Assessment/Plan:     Patient ID: Elsa Carvajal is a 34 y.o. female.     Bariatric Surgery Status/BMI 29/constipation    -s/p Vertical Sleeve Gastrectomy with Dr. Chirag Pimentel on 07/11/2023. Presents to the office today for  an annual visit. Overall doing well, still struggling with constipation. Reports having hard stools along with infrequent bowel movements. She is tolerating a regular diet.  She denies abdominal pain, N/V/D, regurgitation, reflux or dysphagia.  She was able to stop her PPI without any difficulty.  Taking her multivitamins daily.  Patient inquired about conceiving.    Since last visit, she continues to lose weight.  She is increased her caloric intake and notices a difference.    PLAN:     -Can try Colace 100 mg p.o. twice daily.  Recommended if this does not work can add MiraLAX to this regimen.  - Routine follow up in 6 months for 18-month postop visit  - Continue with healthy lifestyle, adequate protein intake of 60 gm, fluid intake of at least 64 oz.   - Continue with MVI daily.  Recommended to switch to bariatric prenatals if planning to conceive.  Obtain labs approximately 2 months after switching.  - Activity as tolerated.   - Labs ordered and will adjust accordingly if any deficiency.   - Follow up with RD and SW as needed.       Continued/Maintain healthy weight loss with good nutrition intakes.  Adequate hydration with at least 64oz. fluid intake.  Follow diet as discussed.  Follow vitamin and mineral recommendations as reviewed with you.  Exercise as tolerated.    Colonoscopy referral made: n/a  Mammogram - N/A    Follow-up in 6 months for 18-month Postop. We kindly ask that your arrive 15 minutes before your scheduled appointment time with your provider to allow our staff to room you, get your vital signs and update your chart.    Get lab work done prior to visit. Please call the office if you need a script.  It is recommended to check with your insurance BEFORE getting labs done to  make sure they are covered by your policy.      Call our office if you have any problems with abdominal pain especially associated with fever, chills, nausea, vomiting or any other concerns.    All  Post-bariatric surgery patients should be aware that very small quantities of any alcohol can cause impairment and it is very possible not to feel the effect. The effect can be in the system for several hours.  It is also a stomach irritant.     It is advised to AVOID alcohol, Nonsteroidal antiinflammatory drugs (NSAIDS) and nicotine of all forms . Any of these can cause stomach irritation/pain.    Discussed the effects of alcohol on a bariatric patient and the increased impairment risk.     Keep up the good work!     Postsurgical Malabsorption   -At risk for malabsorption of vitamins/minerals secondary to malabsorption and restriction of intake from bariatric surgery  -Currently taking adequate postop bariatric surgery vitamin supplementation  -Last set of bariatric labs completed on 11/2023 and showed WNL   -Next set of bariatric labs ordered for approximately 2 months  -Patient received education about the importance of adhering to a lifelong supplementation regimen to avoid vitamin/mineral deficiencies      Diagnoses and all orders for this visit:    Encounter for surgical aftercare following surgery of digestive system  -     CBC; Future  -     Comprehensive metabolic panel; Future  -     Folate; Future  -     Iron Panel (Includes Ferritin, Iron Sat%, Iron, and TIBC); Future  -     PTH, intact; Future  -     Vitamin A; Future  -     Vitamin B1, whole blood; Future  -     Vitamin B12; Future  -     Vitamin D 25 hydroxy; Future  -     Zinc; Future  -     docusate sodium (COLACE) 100 mg capsule; Take 1 capsule (100 mg total) by mouth 2 (two) times a day    Bariatric surgery status  -     CBC; Future  -     Comprehensive metabolic panel; Future  -     Folate; Future  -     Iron Panel (Includes Ferritin, Iron Sat%, Iron,  and TIBC); Future  -     PTH, intact; Future  -     Vitamin A; Future  -     Vitamin B1, whole blood; Future  -     Vitamin B12; Future  -     Vitamin D 25 hydroxy; Future  -     Zinc; Future  -     docusate sodium (COLACE) 100 mg capsule; Take 1 capsule (100 mg total) by mouth 2 (two) times a day    Postsurgical malabsorption  -     CBC; Future  -     Comprehensive metabolic panel; Future  -     Folate; Future  -     Iron Panel (Includes Ferritin, Iron Sat%, Iron, and TIBC); Future  -     PTH, intact; Future  -     Vitamin A; Future  -     Vitamin B1, whole blood; Future  -     Vitamin B12; Future  -     Vitamin D 25 hydroxy; Future  -     Zinc; Future    BMI 29.0-29.9,adult  -     CBC; Future  -     Comprehensive metabolic panel; Future  -     Folate; Future  -     Iron Panel (Includes Ferritin, Iron Sat%, Iron, and TIBC); Future  -     PTH, intact; Future  -     Vitamin A; Future  -     Vitamin B1, whole blood; Future  -     Vitamin B12; Future  -     Vitamin D 25 hydroxy; Future  -     Zinc; Future    Constipation  -     docusate sodium (COLACE) 100 mg capsule; Take 1 capsule (100 mg total) by mouth 2 (two) times a day         Subjective:      Patient ID: Elsa Carvajal is a 34 y.o. female.    -s/p Vertical Sleeve Gastrectomy with Dr. Chirag Pimentel on 07/11/2023. Presents to the office today for  an annual visit. Overall doing well, still struggling with constipation. Reports having hard stools along with infrequent bowel movements. She is tolerating a regular diet.  She denies abdominal pain, N/V/D, regurgitation, reflux or dysphagia.  She was able to stop her PPI without any difficulty.  Taking her multivitamins daily.  Patient inquired about conceiving.      Since last visit, she continues to lose weight.  She is increased her caloric intake and notices a difference.    Initial: 258 lbs  Current: 181.5 lbs   EWL: (Weight loss is ahead of schedule at this post surgical period.)  Emiliano: 178 lbs  Current BMI is  "Body mass index is 29.74 kg/m².    Tolerating a regular diet-yes  Eating at least 60 grams of protein per day-yes  Following 30/60 minute rule with liquids-yes - 30/30 minute rule.   Drinking at least 64 ounces of fluid per day-yes  Drinking carbonated beverages-yes  Sufficient exercise-no   Using NSAIDs regularly-no  Using nicotine-no  Using alcohol-rarely   Supplements:  Multivitamins, Iron, and Calcium  (45 mg of iron)  + probiotic     EWL is 75%, which places the patient ahead of schedule for expected post surgical weight loss at this time.     The following portions of the patient's history were reviewed and updated as appropriate: allergies, current medications, past family history, past medical history, past social history, past surgical history and problem list.    Review of Systems   Constitutional: Negative.    Respiratory: Negative.     Cardiovascular: Negative.    Gastrointestinal:  Positive for constipation.        Denies heartburn, reflux.   Musculoskeletal: Negative.    Neurological: Negative.    Psychiatric/Behavioral: Negative.           Objective:    /68   Pulse 71   Temp 98.5 °F (36.9 °C) (Tympanic)   Ht 5' 5.5\" (1.664 m)   Wt 82.3 kg (181 lb 8 oz)   BMI 29.74 kg/m²      Physical Exam  Vitals and nursing note reviewed.   Constitutional:       Appearance: Normal appearance.   Cardiovascular:      Rate and Rhythm: Normal rate and regular rhythm.      Pulses: Normal pulses.      Heart sounds: Normal heart sounds.   Pulmonary:      Effort: Pulmonary effort is normal.      Breath sounds: Normal breath sounds.   Abdominal:      General: Bowel sounds are normal.      Palpations: Abdomen is soft.      Tenderness: There is no abdominal tenderness.   Musculoskeletal:         General: Normal range of motion.   Skin:     General: Skin is warm and dry.   Neurological:      General: No focal deficit present.      Mental Status: She is alert and oriented to person, place, and time.   Psychiatric:    "      Mood and Affect: Mood normal.         Behavior: Behavior normal.         Thought Content: Thought content normal.         Judgment: Judgment normal.

## 2024-07-25 DIAGNOSIS — Z30.011 OCP (ORAL CONTRACEPTIVE PILLS) INITIATION: ICD-10-CM

## 2024-07-26 RX ORDER — NORETHINDRONE ACETATE AND ETHINYL ESTRADIOL AND FERROUS FUMARATE 1MG-20(24)
1 KIT ORAL DAILY
Qty: 84 TABLET | Refills: 1 | Status: SHIPPED | OUTPATIENT
Start: 2024-07-26

## 2024-08-20 ENCOUNTER — TELEMEDICINE (OUTPATIENT)
Dept: FAMILY MEDICINE CLINIC | Facility: CLINIC | Age: 34
End: 2024-08-20
Payer: COMMERCIAL

## 2024-08-20 VITALS — HEIGHT: 66 IN | WEIGHT: 176 LBS | BODY MASS INDEX: 28.28 KG/M2

## 2024-08-20 DIAGNOSIS — J06.9 ACUTE URI: Primary | ICD-10-CM

## 2024-08-20 DIAGNOSIS — B37.9 YEAST INFECTION: ICD-10-CM

## 2024-08-20 PROCEDURE — 99213 OFFICE O/P EST LOW 20 MIN: CPT | Performed by: NURSE PRACTITIONER

## 2024-08-20 RX ORDER — CEFDINIR 300 MG/1
300 CAPSULE ORAL EVERY 12 HOURS SCHEDULED
Qty: 14 CAPSULE | Refills: 0 | Status: SHIPPED | OUTPATIENT
Start: 2024-08-20 | End: 2024-08-27

## 2024-08-20 RX ORDER — DEXTROMETHORPHAN HYDROBROMIDE AND PROMETHAZINE HYDROCHLORIDE 15; 6.25 MG/5ML; MG/5ML
5 SYRUP ORAL 4 TIMES DAILY PRN
Qty: 118 ML | Refills: 0 | Status: SHIPPED | OUTPATIENT
Start: 2024-08-20

## 2024-08-20 RX ORDER — FLUCONAZOLE 150 MG/1
150 TABLET ORAL ONCE
Qty: 1 TABLET | Refills: 0 | Status: SHIPPED | OUTPATIENT
Start: 2024-08-20 | End: 2024-08-20

## 2024-08-20 NOTE — PROGRESS NOTES
Virtual Regular Visit  Name: Elsa Carvajal      : 1990      MRN: [id number]  Encounter Provider: JAVED Vaughn  Encounter Date: 2024   Encounter department: [facility]    Verification of patient location:    Patient is located at Home in the following state in which I hold an active license [place]    Assessment & Plan   1. Acute URI  -     cefdinir (OMNICEF) 300 mg capsule; Take 1 capsule (300 mg total) by mouth every 12 (twelve) hours for 7 days  -     promethazine-dextromethorphan (PHENERGAN-DM) 6.25-15 mg/5 mL oral syrup; Take 5 mL by mouth 4 (four) times a day as needed for cough  2. Yeast infection  -     fluconazole (DIFLUCAN) 150 mg tablet; Take 1 tablet (150 mg total) by mouth once for 1 dose    #1 Acute URI  Discussed with patient plan to treat with 7 day course of cefdinir along with promethazine DM  #2 Yeast infection  Discussed with patient plan to treat with fluconazole because frequent yeast infections after antibiotic use.   Patient instructed to call if no improvement in 72 hours or symptoms worsen        Depression Screening and Follow-up Plan: Patient was screened for depression during today's encounter. They screened negative with a PHQ-2 score of 0.      Encounter provider JAVED Vaughn    The patient was identified by name and date of birth. Elsa Carvajal was informed that this is a telemedicine visit and that the visit is being conducted through the Epic Embedded platform. She agrees to proceed..  My office door was closed. No one else was in the room.  She acknowledged consent and understanding of privacy and security of the video platform. The patient has agreed to participate and understands they can discontinue the visit at any time.    Patient is aware this is a billable service.     History of Present Illness     34 y.o.female presenting with cough, chest congestion, chest tightness, post nasal drip, slight sore throat, headache and  body aches for the past 4 days. She took a home COVID-19 test and it was negative. She had a co-worker that she was working close to on Friday that tested positive for COVID-19 on Saturday so she was thinking that was the reason for symptoms.         Review of Systems   Constitutional:  Positive for chills, fatigue and fever.   HENT:  Positive for congestion, ear pain, postnasal drip and sore throat. Negative for rhinorrhea, sinus pressure and sinus pain.    Eyes:  Positive for pain and itching.   Respiratory:  Positive for cough, chest tightness and wheezing.    Gastrointestinal:  Negative for abdominal distention, abdominal pain, diarrhea and nausea.   Musculoskeletal:  Positive for myalgias.   Neurological:  Positive for dizziness. Negative for light-headedness and headaches.   Psychiatric/Behavioral: Negative.       Current Outpatient Medications on File Prior to Visit   Medication Sig Dispense Refill   • calcium carbonate (OS-WILLEM) 600 MG tablet Take 600 mg by mouth 2 (two) times a day with meals     • cetirizine (ZyrTEC) 10 mg tablet Take 10 mg by mouth daily as needed      • docusate sodium (COLACE) 100 mg capsule Take 1 capsule (100 mg total) by mouth 2 (two) times a day 180 capsule 3   • fluticasone (FLONASE) 50 mcg/act nasal spray 1 spray into each nostril daily 16 mL 4   • meclizine (ANTIVERT) 25 mg tablet Take 1 tablet (25 mg total) by mouth 3 (three) times a day as needed for dizziness 30 tablet 0   • Multiple Vitamin (multivitamin) tablet Take 1 tablet by mouth daily     • norethindrone-ethinyl estradiol-ferrous fumarate (Junel Fe 24) 1-20 MG-MCG(24) per tablet Take 1 tablet by mouth daily 84 tablet 1   • [DISCONTINUED] fluconazole (DIFLUCAN) 150 mg tablet  (Patient not taking: Reported on 8/20/2024)     • [DISCONTINUED] nystatin (MYCOSTATIN) powder Apply topically 4 (four) times a day (Patient not taking: Reported on 7/16/2024) 60 g 3   • [DISCONTINUED] omeprazole (PriLOSEC) 20 mg delayed release  "capsule TAKE 1 CAPSULE BY MOUTH DAILY (Patient not taking: Reported on 7/16/2024) 30 capsule 0     No current facility-administered medications on file prior to visit.      Objective     Ht 5' 5.5\" (1.664 m)   Wt 79.8 kg (176 lb)   LMP 08/06/2024 (Approximate)   BMI 28.84 kg/m² (Reviewed)    Physical Exam  Constitutional:       General: She is not in acute distress.     Appearance: Normal appearance. She is well-developed and well-groomed. She is not ill-appearing.   HENT:      Head: Normocephalic and atraumatic.      Right Ear: External ear normal.      Left Ear: External ear normal.      Nose: Congestion present.      Mouth/Throat:      Lips: Pink.      Pharynx: Posterior oropharyngeal erythema present.   Eyes:      General: Lids are normal.      Extraocular Movements: Extraocular movements intact.      Conjunctiva/sclera:      Right eye: Right conjunctiva is injected. No exudate.     Left eye: Left conjunctiva is injected. No exudate.     Pupils: Pupils are equal, round, and reactive to light.   Neck:      Trachea: Trachea and phonation normal.   Cardiovascular:      Rate and Rhythm: Normal rate.   Pulmonary:      Effort: Pulmonary effort is normal. No tachypnea, bradypnea, accessory muscle usage or respiratory distress.      Breath sounds: No wheezing.   Skin:     Coloration: Skin is not ashen, cyanotic or pale.   Neurological:      General: No focal deficit present.      Mental Status: She is alert and oriented to person, place, and time.   Psychiatric:         Mood and Affect: Mood normal.         Behavior: Behavior normal. Behavior is cooperative.         Visit Time  Total Visit Duration: 12        "

## 2024-08-21 ENCOUNTER — NURSE TRIAGE (OUTPATIENT)
Age: 34
End: 2024-08-21

## 2024-08-21 DIAGNOSIS — U07.1 COVID-19: Primary | ICD-10-CM

## 2024-08-21 RX ORDER — NIRMATRELVIR AND RITONAVIR 300-100 MG
3 KIT ORAL 2 TIMES DAILY
Qty: 30 TABLET | Refills: 0 | Status: SHIPPED | OUTPATIENT
Start: 2024-08-21 | End: 2024-08-26

## 2024-08-21 NOTE — TELEPHONE ENCOUNTER
"Reason for Disposition   [1] COVID-19 diagnosed by positive lab test (e.g., PCR, rapid self-test kit) AND [2] mild symptoms (e.g., cough, fever, others) AND [3] no complications or SOB    Answer Assessment - Initial Assessment Questions  1. COVID-19 DIAGNOSIS: \"Who made your COVID-19 diagnosis?\" \"Was it confirmed by a positive lab test or self-test?\" If not diagnosed by a doctor (or NP/PA), ask \"Are there lots of cases (community spread) where you live?\" Note: See public health department website, if unsure.      Home test today   2. COVID-19 EXPOSURE: \"Was there any known exposure to COVID before the symptoms began?\" CDC Definition of close contact: within 6 feet (2 meters) for a total of 15 minutes or more over a 24-hour period.       Yes exposed at work   3. ONSET: \"When did the COVID-19 symptoms start?\"       8/17  4. WORST SYMPTOM: \"What is your worst symptom?\" (e.g., cough, fever, shortness of breath, muscle aches)      Headache ,nasal congestion   5. COUGH: \"Do you have a cough?\" If Yes, ask: \"How bad is the cough?\"        Yes ,moderate   6. FEVER: \"Do you have a fever?\" If Yes, ask: \"What is your temperature, how was it measured, and when did it start?\"      Unknown   7. RESPIRATORY STATUS: \"Describe your breathing?\" (e.g., shortness of breath, wheezing, unable to speak)       Tight chest ,denies sob and wheezing   8. BETTER-SAME-WORSE: \"Are you getting better, staying the same or getting worse compared to yesterday?\"  If getting worse, ask, \"In what way?\"      Worse   9. HIGH RISK DISEASE: \"Do you have any chronic medical problems?\" (e.g., asthma, heart or lung disease, weak immune system, obesity, etc.)      No   10. VACCINE: \"Have you had the COVID-19 vaccine?\" If Yes, ask: \"Which one, how many shots, when did you get it?\"        Yes   11. BOOSTER: \"Have you received your COVID-19 booster?\" If Yes, ask: \"Which one and when did you get it?\"        Yes   12. PREGNANCY: \"Is there any chance you are " "pregnant?\" \"When was your last menstrual period?\"        No   13. OTHER SYMPTOMS: \"Do you have any other symptoms?\"  (e.g., chills, fatigue, headache, loss of smell or taste, muscle pain, sore throat)        Chills ,fatigue,muscle aches ,sore throat   14. O2 SATURATION MONITOR:  \"Do you use an oxygen saturation monitor (pulse oximeter) at home?\" If Yes, ask \"What is your reading (oxygen level) today?\" \"What is your usual oxygen saturation reading?\" (e.g., 95%)        No   Patient interested in paxlovid  Patient had a televisit yesterday prior to testing for Covid   Patient requesting a note for work    Protocols used: Coronavirus (COVID-19) Diagnosed or Suspected-ADULT-[place]    "

## 2024-08-21 NOTE — TELEPHONE ENCOUNTER
Spoke to patient advised her provider will write work note. She would like to have the antiviral if possible sent to [facility].

## 2024-09-30 ENCOUNTER — APPOINTMENT (OUTPATIENT)
Dept: LAB | Facility: CLINIC | Age: 34
End: 2024-09-30
Payer: COMMERCIAL

## 2024-09-30 DIAGNOSIS — K91.2 POSTSURGICAL MALABSORPTION: ICD-10-CM

## 2024-09-30 DIAGNOSIS — Z48.815 ENCOUNTER FOR SURGICAL AFTERCARE FOLLOWING SURGERY OF DIGESTIVE SYSTEM: ICD-10-CM

## 2024-09-30 DIAGNOSIS — Z98.84 BARIATRIC SURGERY STATUS: ICD-10-CM

## 2024-09-30 LAB
25(OH)D3 SERPL-MCNC: 69.1 NG/ML (ref 30–100)
ALBUMIN SERPL BCG-MCNC: 4.2 G/DL (ref 3.5–5)
ALP SERPL-CCNC: 43 U/L (ref 34–104)
ALT SERPL W P-5'-P-CCNC: 20 U/L (ref 7–52)
ANION GAP SERPL CALCULATED.3IONS-SCNC: 7 MMOL/L (ref 4–13)
AST SERPL W P-5'-P-CCNC: 16 U/L (ref 13–39)
BILIRUB SERPL-MCNC: 0.49 MG/DL (ref 0.2–1)
BUN SERPL-MCNC: 11 MG/DL (ref 5–25)
CALCIUM SERPL-MCNC: 9.1 MG/DL (ref 8.4–10.2)
CHLORIDE SERPL-SCNC: 104 MMOL/L (ref 96–108)
CO2 SERPL-SCNC: 27 MMOL/L (ref 21–32)
CREAT SERPL-MCNC: 0.68 MG/DL (ref 0.6–1.3)
ERYTHROCYTE [DISTWIDTH] IN BLOOD BY AUTOMATED COUNT: 11.9 % (ref 11.6–15.1)
FERRITIN SERPL-MCNC: 108 NG/ML (ref 11–307)
FOLATE SERPL-MCNC: >22.3 NG/ML
GFR SERPL CREATININE-BSD FRML MDRD: 115 ML/MIN/1.73SQ M
GLUCOSE P FAST SERPL-MCNC: 95 MG/DL (ref 65–99)
HCT VFR BLD AUTO: 40.7 % (ref 34.8–46.1)
HGB BLD-MCNC: 13.9 G/DL (ref 11.5–15.4)
IRON SATN MFR SERPL: 40 % (ref 15–50)
IRON SERPL-MCNC: 126 UG/DL (ref 50–212)
MCH RBC QN AUTO: 31.6 PG (ref 26.8–34.3)
MCHC RBC AUTO-ENTMCNC: 34.2 G/DL (ref 31.4–37.4)
MCV RBC AUTO: 93 FL (ref 82–98)
PLATELET # BLD AUTO: 206 THOUSANDS/UL (ref 149–390)
PMV BLD AUTO: 10.3 FL (ref 8.9–12.7)
POTASSIUM SERPL-SCNC: 3.8 MMOL/L (ref 3.5–5.3)
PROT SERPL-MCNC: 7 G/DL (ref 6.4–8.4)
PTH-INTACT SERPL-MCNC: 28 PG/ML (ref 12–88)
RBC # BLD AUTO: 4.4 MILLION/UL (ref 3.81–5.12)
SODIUM SERPL-SCNC: 138 MMOL/L (ref 135–147)
TIBC SERPL-MCNC: 313 UG/DL (ref 250–450)
UIBC SERPL-MCNC: 187 UG/DL (ref 155–355)
VIT B12 SERPL-MCNC: 708 PG/ML (ref 180–914)
WBC # BLD AUTO: 5.75 THOUSAND/UL (ref 4.31–10.16)

## 2024-09-30 PROCEDURE — 82728 ASSAY OF FERRITIN: CPT

## 2024-09-30 PROCEDURE — 80053 COMPREHEN METABOLIC PANEL: CPT

## 2024-09-30 PROCEDURE — 82306 VITAMIN D 25 HYDROXY: CPT

## 2024-09-30 PROCEDURE — 83550 IRON BINDING TEST: CPT

## 2024-09-30 PROCEDURE — 36415 COLL VENOUS BLD VENIPUNCTURE: CPT

## 2024-09-30 PROCEDURE — 83540 ASSAY OF IRON: CPT

## 2024-09-30 PROCEDURE — 82746 ASSAY OF FOLIC ACID SERUM: CPT

## 2024-09-30 PROCEDURE — 84590 ASSAY OF VITAMIN A: CPT

## 2024-09-30 PROCEDURE — 84630 ASSAY OF ZINC: CPT

## 2024-09-30 PROCEDURE — 82607 VITAMIN B-12: CPT

## 2024-09-30 PROCEDURE — 83970 ASSAY OF PARATHORMONE: CPT

## 2024-09-30 PROCEDURE — 84425 ASSAY OF VITAMIN B-1: CPT

## 2024-09-30 PROCEDURE — 85027 COMPLETE CBC AUTOMATED: CPT

## 2024-10-02 LAB — ZINC SERPL-MCNC: 88 UG/DL (ref 44–115)

## 2024-10-03 LAB — VIT B1 BLD-SCNC: 109.9 NMOL/L (ref 66.5–200)

## 2024-10-05 LAB — VIT A SERPL-MCNC: 38.9 UG/DL (ref 18.9–57.3)

## 2024-11-18 ENCOUNTER — ANNUAL EXAM (OUTPATIENT)
Age: 34
End: 2024-11-18
Payer: COMMERCIAL

## 2024-11-18 VITALS
HEIGHT: 67 IN | WEIGHT: 177.8 LBS | BODY MASS INDEX: 27.91 KG/M2 | SYSTOLIC BLOOD PRESSURE: 122 MMHG | DIASTOLIC BLOOD PRESSURE: 64 MMHG

## 2024-11-18 DIAGNOSIS — R39.9 UTI SYMPTOMS: ICD-10-CM

## 2024-11-18 DIAGNOSIS — Z01.419 ROUTINE GYNECOLOGICAL EXAMINATION: Primary | ICD-10-CM

## 2024-11-18 PROCEDURE — 99395 PREV VISIT EST AGE 18-39: CPT | Performed by: OBSTETRICS & GYNECOLOGY

## 2024-11-18 NOTE — PROGRESS NOTES
Elsa Carvajal  1990      CC:  Yearly exam    S:  34 y.o. female here for yearly exam. Her cycles are regular, not heavy or crampy.   Stopped OCP in July - took a little bit but now things are normal!       She denies vaginal discharge, itching, pelvic pain.   She has some UTI symptoms - burning,  does not have incontinence. No breast concerns.  Struggling some with constipation.     Sexual activity: She is sexually active without pain, bleeding or dryness.   She is  and monogamous.   She is not interested in STD screening today.     Contraception: None, TTC, on PNV    Last Pap: 10/16/22 - NILM, Neg HPV    We reviewed ASCCP guidelines for Pap testing today.     Family hx of breast cancer: no  Family hx of ovarian cancer: no  Family hx of colon cancer: no      Current Outpatient Medications:     calcium carbonate (OS-WILLEM) 600 MG tablet, Take 600 mg by mouth 2 (two) times a day with meals, Disp: , Rfl:     cetirizine (ZyrTEC) 10 mg tablet, Take 10 mg by mouth daily as needed , Disp: , Rfl:     fluticasone (FLONASE) 50 mcg/act nasal spray, 1 spray into each nostril daily, Disp: 16 mL, Rfl: 4    meclizine (ANTIVERT) 25 mg tablet, Take 1 tablet (25 mg total) by mouth 3 (three) times a day as needed for dizziness, Disp: 30 tablet, Rfl: 0    Prenatal Vit-Fe Fumarate-FA (PRENATAL VITAMIN PO), Take by mouth, Disp: , Rfl:   Patient Active Problem List   Diagnosis    Morbid obesity (HCC)    Irregular menstrual bleeding    Bartholin cyst    GERD (gastroesophageal reflux disease)    PCOS (polycystic ovarian syndrome)     Past Medical History:   Diagnosis Date    Abnormal Pap smear of cervix     Allergic rhinitis     Bartholin cyst     COVID-19 12/26/2022    mild s/s, fully vaccinated    GERD (gastroesophageal reflux disease)     HPV in female     Obesity     PCOS (polycystic ovarian syndrome) 11/2019    Polycystic ovary syndrome     Tinnitus     ringing in ears    Varicella     Wears glasses      Family History  "  Problem Relation Age of Onset    Drug abuse Mother         suicide 2011    Alcohol abuse Mother     Depression Mother     Early death Mother     Mental illness Mother     Substance Abuse Mother     Hypertension Father     Diabetes Father     Asthma Father     Gestational diabetes Sister         twin    Asthma Sister     Mental illness Sister     Substance Abuse Sister     No Known Problems Sister     No Known Problems Brother     No Known Problems Brother     Diabetes Maternal Grandmother     Heart failure Maternal Grandmother     Diabetes Paternal Grandmother     Diabetes Paternal Grandfather     Heart failure Paternal Grandfather     Coronary artery disease Family     Diabetes Family     Colon cancer Neg Hx     Ovarian cancer Neg Hx     Breast cancer Neg Hx           Review of Systems   Respiratory: Negative.    Cardiovascular: Negative.    Gastrointestinal: Negative for constipation and diarrhea.     O:  Blood pressure 122/64, height 5' 6.5\" (1.689 m), weight 80.6 kg (177 lb 12.8 oz), last menstrual period 11/07/2024, not currently breastfeeding.    Patient appears well and is not in distress  Breasts are symmetrical without mass, tenderness, nipple discharge, skin changes or adenopathy.   Abdomen is soft and nontender without masses.   External genitals are normal without lesions or rashes.  Urethral meatus and urethra are normal  Bladder is normal to palpation  Vagina is normal without discharge or bleeding.   Cervix is normal without discharge or lesion.   Uterus is normal, mobile, nontender without palpable mass.  Adnexa are normal, nontender, without palpable mass.     A:  Yearly exam.     P:   Pap & HPV up to date   Mammo age 41   Call with positive UPT, in Spring to troubleshoot if not pregnant   UA/Culture for possible UTI symptoms.    RTO one year for yearly exam or sooner as needed.      "

## 2024-11-20 ENCOUNTER — RESULTS FOLLOW-UP (OUTPATIENT)
Dept: LABOR AND DELIVERY | Facility: HOSPITAL | Age: 34
End: 2024-11-20

## 2024-11-20 LAB
APPEARANCE UR: CLEAR
BACTERIA UR CULT: NORMAL
BACTERIA URNS QL MICRO: NORMAL
BILIRUB UR QL STRIP: NEGATIVE
CASTS URNS QL MICRO: NORMAL /LPF
COLOR UR: YELLOW
EPI CELLS #/AREA URNS HPF: NORMAL /HPF (ref 0–10)
GLUCOSE UR QL: NEGATIVE
HGB UR QL STRIP: NEGATIVE
KETONES UR QL STRIP: NEGATIVE
LEUKOCYTE ESTERASE UR QL STRIP: NEGATIVE
Lab: NO GROWTH
MICRO URNS: NORMAL
MICRO URNS: NORMAL
NITRITE UR QL STRIP: NEGATIVE
PH UR STRIP: 6.5 [PH] (ref 5–7.5)
PROT UR QL STRIP: NEGATIVE
RBC #/AREA URNS HPF: NORMAL /HPF (ref 0–2)
SP GR UR: 1.02 (ref 1–1.03)
UROBILINOGEN UR STRIP-ACNC: 0.2 MG/DL (ref 0.2–1)
WBC #/AREA URNS HPF: NORMAL /HPF (ref 0–5)

## 2024-11-22 ENCOUNTER — OFFICE VISIT (OUTPATIENT)
Dept: FAMILY MEDICINE CLINIC | Facility: CLINIC | Age: 34
End: 2024-11-22
Payer: COMMERCIAL

## 2024-11-22 VITALS
BODY MASS INDEX: 28.25 KG/M2 | TEMPERATURE: 97.3 F | HEART RATE: 70 BPM | DIASTOLIC BLOOD PRESSURE: 76 MMHG | SYSTOLIC BLOOD PRESSURE: 112 MMHG | HEIGHT: 67 IN | OXYGEN SATURATION: 99 % | WEIGHT: 180 LBS

## 2024-11-22 DIAGNOSIS — J02.9 SORE THROAT: Primary | ICD-10-CM

## 2024-11-22 LAB — S PYO AG THROAT QL: NEGATIVE

## 2024-11-22 PROCEDURE — 99213 OFFICE O/P EST LOW 20 MIN: CPT | Performed by: FAMILY MEDICINE

## 2024-11-22 PROCEDURE — 87880 STREP A ASSAY W/OPTIC: CPT | Performed by: FAMILY MEDICINE

## 2024-11-22 RX ORDER — AMOXICILLIN 500 MG/1
500 CAPSULE ORAL EVERY 8 HOURS SCHEDULED
Qty: 30 CAPSULE | Refills: 0 | Status: SHIPPED | OUTPATIENT
Start: 2024-11-22 | End: 2024-12-02

## 2024-11-22 NOTE — PROGRESS NOTES
"Name: Elsa Carvajal      : 1990      MRN: [id number]  Encounter Provider: Sai Farmer MD  Encounter Date: 2024   Encounter department: [facility]  :  Assessment & Plan  Sore throat  Although rapid strep negative, given tonsillar enlargement and purulent drainage recommend treatment with amoxicillin  Counseled the patient regarding supportive care.  They are to call or return to the office if not improving.    Orders:    POCT rapid ANTIGEN strepA    amoxicillin (AMOXIL) 500 mg capsule; Take 1 capsule (500 mg total) by mouth every 8 (eight) hours for 10 days           History of Present Illness     HPI Patient reports sore throat starting yesterday, right ear pain. Denies fever, chills, diarrhea. +fatigue.       Review of Systems   Constitutional:  Positive for fatigue. Negative for chills and fever.   HENT:  Positive for ear pain and sore throat. Negative for congestion.    Eyes:  Negative for pain and visual disturbance.   Respiratory:  Negative for cough and shortness of breath.    Cardiovascular:  Negative for chest pain and palpitations.   Gastrointestinal:  Negative for abdominal pain and nausea.   Genitourinary:  Negative for dysuria.   Musculoskeletal:  Negative for arthralgias and myalgias.   Skin:  Negative for rash and wound.   Neurological:  Negative for dizziness and headaches.   All other systems reviewed and are negative.    Medical History Reviewed by provider this encounter:     .     Objective   /76   Pulse 70   Temp (!) 97.3 °F (36.3 °C)   Ht 5' 6.5\" (1.689 m)   Wt 81.6 kg (180 lb)   LMP 2024 (Exact Date)   SpO2 99%   BMI 28.62 kg/m²      Physical Exam  Vitals and nursing note reviewed.   Constitutional:       General: She is not in acute distress.     Appearance: She is well-developed.   HENT:      Head: Normocephalic and atraumatic.      Right Ear: Ear canal and external ear normal. A middle ear effusion (serous) is " present. Tympanic membrane is not erythematous.      Left Ear: Ear canal and external ear normal. A middle ear effusion (serous) is present. Tympanic membrane is not erythematous.      Nose: Nose normal.      Mouth/Throat:      Mouth: Mucous membranes are moist.      Pharynx: Posterior oropharyngeal erythema present. No oropharyngeal exudate.      Tonsils: Tonsillar exudate present. No tonsillar abscesses. 3+ on the right.   Eyes:      Conjunctiva/sclera: Conjunctivae normal.   Neck:      Trachea: No tracheal deviation.   Cardiovascular:      Rate and Rhythm: Normal rate and regular rhythm.      Heart sounds: Normal heart sounds. No murmur heard.  Pulmonary:      Effort: Pulmonary effort is normal.   Abdominal:      Tenderness: There is no abdominal tenderness.   Lymphadenopathy:      Cervical: Cervical adenopathy present.   Skin:     General: Skin is warm and dry.      Capillary Refill: Capillary refill takes less than 2 seconds.      Findings: No rash.   Neurological:      Mental Status: She is alert.      Cranial Nerves: No cranial nerve deficit.

## 2024-11-29 DIAGNOSIS — B37.31 CANDIDA VAGINITIS: Primary | ICD-10-CM

## 2024-11-29 RX ORDER — FLUCONAZOLE 150 MG/1
150 TABLET ORAL ONCE
Qty: 1 TABLET | Refills: 0 | Status: SHIPPED | OUTPATIENT
Start: 2024-11-29 | End: 2024-11-29

## 2025-01-16 NOTE — PATIENT INSTRUCTIONS
Patient called to cancel his appt with Wyatt and stated that he would call back to reschedule.    Prescriptions sent to the pharmacy for prednisone, albuterol inhaler and azithromycin-use as directed  Continue OTC decongestant/cough medication as ordered  Saline nasal spray several times daily, Flonase in a m , cool mist humidifier, Tylenol/ibuprofen as needed for fever or pain  Follow up with PCP in 3-5 days  Proceed to  ER if symptoms worsen  Cold Symptoms   AMBULATORY CARE:   Cold symptoms  include sneezing, dry throat, a stuffy nose, headache, watery eyes, and a cough  Your cough may be dry, or you may cough up mucus  You may also have muscle aches, joint pain, and tiredness  Rarely, you may have a fever  Cold symptoms occur from inflammation in your upper respiratory system caused by a virus  Most colds go away without treatment  Seek care immediately if:   · You have increased tiredness and weakness  · You are unable to eat  · Your heart is beating much faster than usual for you  · You see white spots in the back of your throat and your neck is swollen and sore to the touch  · You see pinpoint or larger reddish-purple dots on your skin  Contact your healthcare provider if:   · You have a fever higher than 102°F (38 9°C)  · You have new or worsening shortness of breath  · You have thick nasal drainage for more than 2 days  · Your symptoms do not improve or get worse within 5 days  · You have questions or concerns about your condition or care  Treatment for cold symptoms  may include NSAIDS to decrease muscle aches and fever  Cold medicines may also be given to decrease coughing, nasal stuffiness, sneezing, and a runny nose  Manage your cold symptoms: The following may help relieve cold symptoms, such as a dry throat and congestion:  · Gargle with mouthwash or warm salt water as directed  · Suck on throat lozenges or hard candy  · Use a cold or warm vaporizer or humidifier to ease your breathing       · Rest for at least 2 days and then as needed to decrease tiredness and weakness  · Use petroleum based jelly around your nostrils to decrease irritation from blowing your nose  · Drink plenty of liquids  Liquids will help thin and loosen thick mucus so you can cough it up  Liquids will also keep you hydrated  Ask your healthcare provider which liquids are best for you and how much to drink each day  Prevent the spread of germs  by washing your hands often  You can spread your cold germs to others for at least 3 days after your symptoms start  Do not share items, such as eating utensils  Cover your nose and mouth when you cough or sneeze using the crook of your elbow instead of your hands  Throw used tissues in the garbage  Do not smoke:  Smoking may worsen your symptoms and increase the length of time you feel sick  Talk with your healthcare provider if you need help to stop smoking  Follow up with your healthcare provider as directed:  Write down your questions so you remember to ask them during your visits  © 2017 [facility] Information is for End User's use only and may not be sold, redistributed or otherwise used for commercial purposes  All illustrations and images included in CareNotes® are the copyrighted property of [employer] , [employer]  or [employer]  The above information is an  only  It is not intended as medical advice for individual conditions or treatments  Talk to your doctor, nurse or pharmacist before following any medical regimen to see if it is safe and effective for you

## 2025-01-17 ENCOUNTER — OFFICE VISIT (OUTPATIENT)
Dept: BARIATRICS | Facility: CLINIC | Age: 35
End: 2025-01-17
Payer: COMMERCIAL

## 2025-01-17 VITALS
TEMPERATURE: 97.4 F | DIASTOLIC BLOOD PRESSURE: 78 MMHG | SYSTOLIC BLOOD PRESSURE: 120 MMHG | WEIGHT: 180 LBS | BODY MASS INDEX: 28.25 KG/M2 | HEIGHT: 67 IN | OXYGEN SATURATION: 99 % | HEART RATE: 77 BPM

## 2025-01-17 DIAGNOSIS — Z48.815 ENCOUNTER FOR SURGICAL AFTERCARE FOLLOWING SURGERY OF DIGESTIVE SYSTEM: Primary | ICD-10-CM

## 2025-01-17 DIAGNOSIS — Z98.84 BARIATRIC SURGERY STATUS: ICD-10-CM

## 2025-01-17 DIAGNOSIS — K59.00 CONSTIPATION: ICD-10-CM

## 2025-01-17 DIAGNOSIS — K91.2 POSTSURGICAL MALABSORPTION: ICD-10-CM

## 2025-01-17 PROCEDURE — 99213 OFFICE O/P EST LOW 20 MIN: CPT | Performed by: NURSE PRACTITIONER

## 2025-01-17 NOTE — PROGRESS NOTES
Date of surgery: 7/11/2023  Procedure: Sleeve   Performing surgeon: Dr. Chirag Pimentel     Initial Weight -  255.5 lb   Current Weight - 180.0 lb   Emiliano Weight - 180.0  lb ( current Weight)   Total Body Weight Loss (EWL)- 75.4  EWL% - 77%  TWB % - 30%

## 2025-01-17 NOTE — PROGRESS NOTES
Assessment/Plan:     Patient ID: Elsa Carvajal is a 34 y.o. female.     Bariatric Surgery Status/BMI 28    -s/p Vertical Sleeve Gastrectomy with Dr. Chirag Pimentel on 07/11/2023. Presents to the office today for 18 month post op visit. Overall doing well - happy with her weight loss but will be signing up for orange theory. Anticipate she would be able to lose more weight with more physical activity. Tolerating a regular diet. Denies having any abdominal pain, N/V/D, regurgitation, reflux or dysphagia. Taking her multivitamins daily.     Constipation - struggles with constipation even prior to surgery but has worsened since surgery. She has tried colace along with miralax but this is ineffective. She is having a bowel movement 1 a week and is having hard stools. Has twin sister who also struggles with this.     PLAN:     - refer to GI for chronic constipation.   - Routine follow up in 6 months for annual visit.   - Continue with healthy lifestyle, adequate protein intake of 60 gm, fluid intake of at least 64 oz.   - Continue with MVI daily.   - Activity as tolerated.   - Labs ordered and will adjust accordingly if any deficiency.   - Follow up with RD and SW as needed.       Continued/Maintain healthy weight loss with good nutrition intakes.  Adequate hydration with at least 64oz. fluid intake.  Follow diet as discussed.  Follow vitamin and mineral recommendations as reviewed with you.  Exercise as tolerated.    Colonoscopy referral made: N/A  MAMMOGRAM - N/A    Follow-up in 6 months for 2nd annual visit. We kindly ask that your arrive 15 minutes before your scheduled appointment time with your provider to allow our staff to room you, get your vital signs and update your chart.    Get lab work done prior to annual visit. Please call the office if you need a script.  It is recommended to check with your insurance BEFORE getting labs done to make sure they are covered by your policy.      Call our office if you have any  problems with abdominal pain especially associated with fever, chills, nausea, vomiting or any other concerns.    All  Post-bariatric surgery patients should be aware that very small quantities of any alcohol can cause impairment and it is very possible not to feel the effect. The effect can be in the system for several hours.  It is also a stomach irritant.     It is advised to AVOID alcohol, Nonsteroidal antiinflammatory drugs (NSAIDS) and nicotine of all forms . Any of these can cause stomach irritation/pain.    Discussed the effects of alcohol on a bariatric patient and the increased impairment risk.     Keep up the good work!     Postsurgical Malabsorption   -At risk for malabsorption of vitamins/minerals secondary to malabsorption and restriction of intake from bariatric surgery  -Currently taking adequate postop bariatric surgery vitamin supplementation  -Last set of bariatric labs completed on 09/2024 and showed WNL  -Patient received education about the importance of adhering to a lifelong supplementation regimen to avoid vitamin/mineral deficiencies      Diagnoses and all orders for this visit:    Encounter for surgical aftercare following surgery of digestive system    Postsurgical malabsorption    Bariatric surgery status  -     Ambulatory referral to Gastroenterology; Future    Constipation  -     Ambulatory referral to Gastroenterology; Future    BMI 28.0-28.9,adult    Other orders  -     Multiple Vitamins-Minerals (PROBIOTICS + BARIATRIC MULTI PO); Take by mouth         Subjective:      Patient ID: Elsa Carvajal is a 34 y.o. female.    -s/p Vertical Sleeve Gastrectomy with Dr. Chirag Pimentel on 07/11/2023. Presents to the office today for 18 month post op visit. Overall doing well - happy with her weight loss but will be signing up for orange theory. Anticipate she would be able to lose more weight with more physical activity. Tolerating a regular diet. Denies having any abdominal pain, N/V/D,  "regurgitation, reflux or dysphagia. Taking her multivitamins daily.     Constipation - struggles with constipation even prior to surgery but has worsened since surgery. She has tried colace along with miralax but this is ineffective. She is having a bowel movement 1 a week and is having hard stools. Has twin sister who also struggles with this.       Initial: 255 lbs  Current: 180 LBS  EWL: (Weight loss is ahead of schedule at this post surgical period.)  Emiliano: current   Current BMI is Body mass index is 28.62 kg/m².    Tolerating a regular diet-yes  Eating at least 60 grams of protein per day-yes  Following 30/60 minute rule with liquids-yes - does 30/30 minute rule  Drinking at least 64 ounces of fluid per day-yes  Drinking carbonated beverages-no  Sufficient exercise-no  Using NSAIDs regularly-no  Using nicotine-no  Using alcohol-occasional   Supplements: Multivitamins, Iron, and Calcium  (45 mg of iron)  + probiotic + fibers     EWL is 77%, which places the patient ahead of schedule for expected post surgical weight loss at this time.     The following portions of the patient's history were reviewed and updated as appropriate: allergies, current medications, past family history, past medical history, past social history, past surgical history and problem list.    Review of Systems   Constitutional: Negative.    Respiratory: Negative.     Cardiovascular: Negative.    Gastrointestinal:  Positive for constipation.   Musculoskeletal:  Positive for arthralgias (Having multiple joint pain - advised to see PCP for any further work up.).   Neurological: Negative.    Psychiatric/Behavioral: Negative.           Objective:    /78 (BP Location: Left arm, Patient Position: Sitting, Cuff Size: Adult)   Pulse 77   Temp (!) 97.4 °F (36.3 °C) (Tympanic)   Ht 5' 6.5\" (1.689 m)   Wt 81.6 kg (180 lb)   SpO2 99%   BMI 28.62 kg/m²      Physical Exam  Vitals and nursing note reviewed.   Constitutional:       Appearance: " Normal appearance.   Cardiovascular:      Rate and Rhythm: Normal rate and regular rhythm.      Pulses: Normal pulses.      Heart sounds: Normal heart sounds.   Pulmonary:      Effort: Pulmonary effort is normal.      Breath sounds: Normal breath sounds.   Abdominal:      General: Bowel sounds are normal.      Palpations: Abdomen is soft.      Tenderness: There is no abdominal tenderness.   Musculoskeletal:         General: Normal range of motion.   Skin:     General: Skin is warm and dry.   Neurological:      General: No focal deficit present.      Mental Status: She is alert and oriented to person, place, and time.   Psychiatric:         Mood and Affect: Mood normal.         Behavior: Behavior normal.         Thought Content: Thought content normal.         Judgment: Judgment normal.

## 2025-02-21 ENCOUNTER — OFFICE VISIT (OUTPATIENT)
Dept: GASTROENTEROLOGY | Facility: AMBULARY SURGERY CENTER | Age: 35
End: 2025-02-21
Payer: COMMERCIAL

## 2025-02-21 VITALS
DIASTOLIC BLOOD PRESSURE: 84 MMHG | HEART RATE: 74 BPM | SYSTOLIC BLOOD PRESSURE: 132 MMHG | OXYGEN SATURATION: 96 % | BODY MASS INDEX: 29.67 KG/M2 | WEIGHT: 184.6 LBS | HEIGHT: 66 IN

## 2025-02-21 DIAGNOSIS — K58.1 IRRITABLE BOWEL SYNDROME WITH CONSTIPATION: ICD-10-CM

## 2025-02-21 DIAGNOSIS — Z98.84 BARIATRIC SURGERY STATUS: ICD-10-CM

## 2025-02-21 DIAGNOSIS — K59.00 CONSTIPATION, UNSPECIFIED CONSTIPATION TYPE: Primary | ICD-10-CM

## 2025-02-21 DIAGNOSIS — K62.5 RECTAL BLEEDING: ICD-10-CM

## 2025-02-21 DIAGNOSIS — K59.00 CONSTIPATION: ICD-10-CM

## 2025-02-21 PROCEDURE — 99204 OFFICE O/P NEW MOD 45 MIN: CPT | Performed by: PHYSICIAN ASSISTANT

## 2025-02-21 RX ORDER — SODIUM CHLORIDE, SODIUM LACTATE, POTASSIUM CHLORIDE, CALCIUM CHLORIDE 600; 310; 30; 20 MG/100ML; MG/100ML; MG/100ML; MG/100ML
125 INJECTION, SOLUTION INTRAVENOUS CONTINUOUS
OUTPATIENT
Start: 2025-02-21

## 2025-02-21 NOTE — PATIENT INSTRUCTIONS
Scheduled date of colonoscopy (as of today): 4/9/2025  Physician performing colonoscopy: Dr. Durbin  Location of colonoscopy:[facility]  Bowel prep reviewed with patient: Golytely/Dulcolax  Instructions reviewed with patient by:Claudia  Clearances: N/A    Mix 7 capfuls of Miralax with 32 oz of a sports drink and drink over 1-2 hours. If no significant stool evacuation in the next 6-8 hours, you can repeat this one time. You can take 10 mg of dulcolax before to try to help as well.     After this, start 1 capful of Miralax every day.

## 2025-02-21 NOTE — PROGRESS NOTES
Name: Elsa Carvajal      : 1990      MRN: [id number]  Encounter Provider: Lucinda Shaffer PA-C  Encounter Date: 2025   Encounter department: [facility]  :  Assessment & Plan  Irritable bowel syndrome with constipation  Patient reports constipation since around  however significantly worsened after gastric sleeve.  She reports bowel movement possibly once a week with significant straining, gas and bloating.  She has been taking fiber, probiotic and MiraLAX without improvement.  Interestingly, her twin sister is also having constipation symptoms.  She also reports symptoms of dyspareunia, occasional rectal bleeding and vaginal bleeding.  Suspect in the setting of irritable bowel syndrome however rule out underlying thyroid dysfunction, less likely polyp or lesion, question possible endometriosis.    -Due to severity of symptoms without improvement of conservative management, recommend colonoscopy.  - GoLytely bowel prep.  - Discussed the risks of procedure including bleeding, infection, perforation, incomplete procedure.    -Recommend patient complete a bowel cleanse with MiraLAX 7 capfuls mixed with 32 ounces of sports drink.  Recommend repeating this 1 more time if no significant stool output  -After this, restart on MiraLAX 1 capful daily and increase to 1 capful twice daily if no improvement.    - We will try to get authorization for Linzess 145 mcg daily.  Discussed taking this 30 minutes before breakfast.  Recommend she reach out to me in 1 week if no improvement to consider increasing dose.    -I recommend patient follow-up with GYN as well due to symptoms of dyspareunia, rectal and vaginal bleeding to see if any concern for endometriosis.  - Due to the symptoms, she would likely also benefit from pelvic floor therapy which we briefly discussed.    -Of note, patient reports she was currently not using protection therefore has possibility of becoming  pregnant.  I did discuss that Linzess is contraindicated in pregnancy as well as procedure such as colonoscopy.  She reports she will use protection and hold off on attempting to become pregnant as we await workup.    Rectal bleeding  See above.  Orders:    Colonoscopy; Future        History of Present Illness   HPI  Elsa Carvajal is a 34 y.o. female who presents with ERIKA, PCOS, gastric sleeve, presents the office as a new patient for constipation.    Patient reports having some constipation symptoms since around 2022 however these significantly worsened after gastric sleeve surgery.  She has tried fiber supplement, probiotic, MiraLAX every other day and stool softeners without improvement.  With this she is still only having a bowel movement once or twice a week.  Significant straining.  She reports significant gas and bloating.  She reports occasional rectal bleeding.  She also reports dyspareunia, vaginal bleeding.  She reports some nausea but no vomiting.    No prior abdominal surgeries.    Unknown family history.  However, her twin sister is also having difficulty with constipation currently and is on Linzess and possibly Motegrity.    Prior EGD by bariatric team in 2023 with fundic gland polyps and negative for H. pylori.  No prior colonoscopy.        Review of Systems   All other systems reviewed and are negative.         Objective   There were no vitals taken for this visit.     Physical Exam  Vitals reviewed.   Constitutional:       General: She is not in acute distress.     Appearance: Normal appearance. She is not ill-appearing.   HENT:      Head: Normocephalic and atraumatic.   Eyes:      Conjunctiva/sclera: Conjunctivae normal.   Cardiovascular:      Rate and Rhythm: Normal rate and regular rhythm.      Heart sounds: No murmur heard.  Pulmonary:      Effort: Pulmonary effort is normal. No respiratory distress.      Breath sounds: Normal breath sounds.   Abdominal:      General: Abdomen is flat. There  is no distension.      Palpations: Abdomen is soft.      Tenderness: There is no abdominal tenderness. There is no guarding or rebound.   Musculoskeletal:         General: No swelling.      Cervical back: Normal range of motion.      Right lower leg: No edema.      Left lower leg: No edema.   Skin:     General: Skin is warm.      Coloration: Skin is not jaundiced.   Neurological:      General: No focal deficit present.      Mental Status: She is alert and oriented to person, place, and time. Mental status is at baseline.   Psychiatric:         Mood and Affect: Mood normal.         Behavior: Behavior normal.

## 2025-02-24 ENCOUNTER — RESULTS FOLLOW-UP (OUTPATIENT)
Dept: GASTROENTEROLOGY | Facility: AMBULARY SURGERY CENTER | Age: 35
End: 2025-02-24

## 2025-02-24 ENCOUNTER — TELEPHONE (OUTPATIENT)
Age: 35
End: 2025-02-24

## 2025-02-24 ENCOUNTER — APPOINTMENT (OUTPATIENT)
Dept: LAB | Facility: AMBULARY SURGERY CENTER | Age: 35
End: 2025-02-24
Payer: COMMERCIAL

## 2025-02-24 DIAGNOSIS — K59.00 CONSTIPATION, UNSPECIFIED CONSTIPATION TYPE: ICD-10-CM

## 2025-02-24 LAB — TSH SERPL DL<=0.05 MIU/L-ACNC: 1.52 UIU/ML (ref 0.45–4.5)

## 2025-02-24 PROCEDURE — 84443 ASSAY THYROID STIM HORMONE: CPT

## 2025-02-24 PROCEDURE — 36415 COLL VENOUS BLD VENIPUNCTURE: CPT

## 2025-02-24 NOTE — TELEPHONE ENCOUNTER
PA for Lnzess 145 mcg    SUBMITTED to [facility]    via    [x][employer]-Case ID #   [id number]:[facility] - InformedRxPhone:[phone]     All office notes, labs and other pertaining documents and studies sent. Clinical questions answered. Awaiting determination from insurance company.     Turnaround time for your insurance to make a decision on your Prior Authorization can take 7-21 business days.

## 2025-02-25 ENCOUNTER — TELEPHONE (OUTPATIENT)
Age: 35
End: 2025-02-25

## 2025-02-25 NOTE — TELEPHONE ENCOUNTER
----- Message from Lucinda Shaffer PA-C sent at 2/21/2025  3:27 PM EST -----  Hi, can you please help with authorization for Linzess 145 mcg daily for IBS-C? Thank you!

## 2025-02-25 NOTE — TELEPHONE ENCOUNTER
PA for Linzess APPROVED     Date(s) approved Authorized from February 24, 2025 to February 24, 2026    This medication is a possible high dollar medication. The patient has not been contacted regarding the decision as the office clinical team will handle advising the patient and if/any patient assistance that may have been started.  Thank you.       Patient advised by          []TappInhart Message  []Phone call   []LMOM  []L/M to call office as no active Communication consent on file  []Unable to leave detailed message as VM not approved on Communication consent       Pharmacy advised by    [x]Fax  []Phone call    Specialty Pharmacy    []     Approval letter scanned into Media Yes

## 2025-03-11 ENCOUNTER — RA CDI HCC (OUTPATIENT)
Dept: OTHER | Facility: HOSPITAL | Age: 35
End: 2025-03-11

## 2025-03-11 NOTE — PROGRESS NOTES
HCC coding opportunities       Chart reviewed, no opportunity found: CHART REVIEWED, NO OPPORTUNITY FOUND        Patients Insurance        Commercial Insurance: [email or web address]

## 2025-03-18 ENCOUNTER — OFFICE VISIT (OUTPATIENT)
Dept: FAMILY MEDICINE CLINIC | Facility: CLINIC | Age: 35
End: 2025-03-18
Payer: COMMERCIAL

## 2025-03-18 VITALS
TEMPERATURE: 97.8 F | WEIGHT: 183 LBS | HEIGHT: 66 IN | HEART RATE: 70 BPM | OXYGEN SATURATION: 98 % | SYSTOLIC BLOOD PRESSURE: 118 MMHG | BODY MASS INDEX: 29.41 KG/M2 | DIASTOLIC BLOOD PRESSURE: 84 MMHG

## 2025-03-18 DIAGNOSIS — M25.50 CHRONIC JOINT PAIN: ICD-10-CM

## 2025-03-18 DIAGNOSIS — Z00.00 ANNUAL PHYSICAL EXAM: Primary | ICD-10-CM

## 2025-03-18 DIAGNOSIS — G89.29 CHRONIC JOINT PAIN: ICD-10-CM

## 2025-03-18 PROCEDURE — 99395 PREV VISIT EST AGE 18-39: CPT | Performed by: NURSE PRACTITIONER

## 2025-03-18 RX ORDER — METHOCARBAMOL 500 MG/1
500 TABLET, FILM COATED ORAL
Qty: 30 TABLET | Refills: 0 | Status: SHIPPED | OUTPATIENT
Start: 2025-03-18

## 2025-03-18 NOTE — PATIENT INSTRUCTIONS
"Patient Education     Routine physical for adults   The Basics   Written by the doctors and editors at [facility]   What is a physical? -- A physical is a routine visit, or \"check-up,\" with your doctor. You might also hear it called a \"wellness visit\" or \"preventive visit.\"  During each visit, the doctor will:   Ask about your physical and mental health   Ask about your habits, behaviors, and lifestyle   Do an exam   Give you vaccines if needed   Talk to you about any medicines you take   Give advice about your health   Answer your questions  Getting regular check-ups is an important part of taking care of your health. It can help your doctor find and treat any problems you have. But it's also important for preventing health problems.  A routine physical is different from a \"sick visit.\" A sick visit is when you see a doctor because of a health concern or problem. Since physicals are scheduled ahead of time, you can think about what you want to ask the doctor.  How often should I get a physical? -- It depends on your age and health. In general, for people age 21 years and older:   If you are younger than 50 years, you might be able to get a physical every 3 years.   If you are 50 years or older, your doctor might recommend a physical every year.  If you have an ongoing health condition, like diabetes or high blood pressure, your doctor will probably want to see you more often.  What happens during a physical? -- In general, each visit will include:   Physical exam - The doctor or nurse will check your height, weight, heart rate, and blood pressure. They will also look at your eyes and ears. They will ask about how you are feeling and whether you have any symptoms that bother you.   Medicines - It's a good idea to bring a list of all the medicines you take to each doctor visit. Your doctor will talk to you about your medicines and answer any questions. Tell them if you are having any side effects that bother you. You " "should also tell them if you are having trouble paying for any of your medicines.   Habits and behaviors - This includes:   Your diet   Your exercise habits   Whether you smoke, drink alcohol, or use drugs   Whether you are sexually active   Whether you feel safe at home  Your doctor will talk to you about things you can do to improve your health and lower your risk of health problems. They will also offer help and support. For example, if you want to quit smoking, they can give you advice and might prescribe medicines. If you want to improve your diet or get more physical activity, they can help you with this, too.   Lab tests, if needed - The tests you get will depend on your age and situation. For example, your doctor might want to check your:   Cholesterol   Blood sugar   Iron level   Vaccines - The recommended vaccines will depend on your age, health, and what vaccines you already had. Vaccines are very important because they can prevent certain serious or deadly infections.   Discussion of screening - \"Screening\" means checking for diseases or other health problems before they cause symptoms. Your doctor can recommend screening based on your age, risk, and preferences. This might include tests to check for:   Cancer, such as breast, prostate, cervical, ovarian, colorectal, prostate, lung, or skin cancer   Sexually transmitted infections, such as chlamydia and gonorrhea   Mental health conditions like depression and anxiety  Your doctor will talk to you about the different types of screening tests. They can help you decide which screenings to have. They can also explain what the results might mean.   Answering questions - The physical is a good time to ask the doctor or nurse questions about your health. If needed, they can refer you to other doctors or specialists, too.  Adults older than 65 years often need other care, too. As you get older, your doctor will talk to you about:   How to prevent falling at " home   Hearing or vision tests   Memory testing   How to take your medicines safely   Making sure that you have the help and support you need at home  All topics are updated as new evidence becomes available and our peer review process is complete.  This topic retrieved from [employer] on: May 02, 2024.  Topic 660965 Version 1.0  Release: 32.4.3 - C32.122  © 2024 UpToDate, Inc. and/or its affiliates. All rights reserved.  Consumer Information Use and Disclaimer   Disclaimer: This generalized information is a limited summary of diagnosis, treatment, and/or medication information. It is not meant to be comprehensive and should be used as a tool to help the user understand and/or assess potential diagnostic and treatment options. It does NOT include all information about conditions, treatments, medications, side effects, or risks that may apply to a specific patient. It is not intended to be medical advice or a substitute for the medical advice, diagnosis, or treatment of a health care provider based on the health care provider's examination and assessment of a patient's specific and unique circumstances. Patients must speak with a health care provider for complete information about their health, medical questions, and treatment options, including any risks or benefits regarding use of medications. This information does not endorse any treatments or medications as safe, effective, or approved for treating a specific patient. UpToDate, Inc. and its affiliates disclaim any warranty or liability relating to this information or the use thereof.The use of this information is governed by the Terms of Use, available at https://www.woltersRealeyesuwer.com/en/know/clinical-effectiveness-terms. 2024© UpToDate, Inc. and its affiliates and/or licensors. All rights reserved.  Copyright   © 2024 UpToDate, Inc. and/or its affiliates. All rights reserved.

## 2025-03-18 NOTE — PROGRESS NOTES
Adult Annual Physical  Name: Elsa Carvajal      : 1990      MRN: [id number]  Encounter Provider: JAVED Vaughn  Encounter Date: 3/18/2025   Encounter department: [facility]    Assessment & Plan  Annual physical exam         Chronic joint pain    Orders:  •  methocarbamol (ROBAXIN) 500 mg tablet; Take 1 tablet (500 mg total) by mouth daily at bedtime as needed for muscle spasms    Preventive Screenings:  - Diabetes Screening: screening up-to-date and risks/benefits discussed  - Cholesterol Screening: risks/benefits discussed   - Hepatitis C screening: patient declines   - HIV screening: patient declines   - Cervical cancer screening: screening up-to-date   - Colon cancer screening: screening not indicated   - Lung cancer screening: screening not indicated   - Prostate cancer screening: screening not indicated     Counseling/Anticipatory Guidance:  - Alcohol: discussed moderation in alcohol intake and recommendations for healthy alcohol use.   - Dental health: discussed importance of regular tooth brushing, flossing, and dental visits.   - Sexual health: discussed sexually transmitted diseases, partner selection, use of condoms, avoidance of unintended pregnancy, and contraceptive alternatives.   - Diet: discussed recommendations for a healthy/well-balanced diet.   - Exercise: the importance of regular exercise/physical activity was discussed. Recommend exercise 3-5 times per week for at least 30 minutes.   - Injury prevention: discussed safety/seat belts, safety helmets, smoke detectors, carbon monoxide detectors, and smoking near bedding or upholstery.          History of Present Illness   Adult Annual Physical  Review of Systems   Constitutional:  Negative for activity change, appetite change and unexpected weight change.   HENT:  Negative for dental problem, ear pain, hearing loss, nosebleeds, sneezing, sore throat, tinnitus and trouble swallowing.    Eyes:  Negative for  "visual disturbance.   Respiratory:  Negative for cough, chest tightness, shortness of breath and wheezing.    Cardiovascular:  Negative for chest pain, palpitations and leg swelling.   Gastrointestinal:  Positive for constipation. Negative for abdominal distention, abdominal pain, diarrhea and nausea.   Endocrine: Negative for polydipsia and polyuria.   Genitourinary: Negative.    Musculoskeletal:  Positive for arthralgias, back pain and neck pain. Negative for myalgias.   Skin:  Negative for color change and rash.   Allergic/Immunologic: Negative for environmental allergies.   Neurological:  Negative for dizziness, weakness, light-headedness and headaches.   Hematological: Negative.    Psychiatric/Behavioral: Negative.  Negative for dysphoric mood and sleep disturbance. The patient is not nervous/anxious.      Current Outpatient Medications on File Prior to Visit   Medication Sig Dispense Refill   • calcium carbonate (OS-WILLEM) 600 MG tablet Take 600 mg by mouth 2 (two) times a day with meals     • cetirizine (ZyrTEC) 10 mg tablet Take 10 mg by mouth daily as needed      • fluticasone (FLONASE) 50 mcg/act nasal spray 1 spray into each nostril daily 16 mL 4   • linaCLOtide 145 MCG CAPS Take 1 capsule (145 mcg total) by mouth daily before breakfast 30 capsule 5   • meclizine (ANTIVERT) 25 mg tablet Take 1 tablet (25 mg total) by mouth 3 (three) times a day as needed for dizziness 30 tablet 0   • Multiple Vitamins-Minerals (PROBIOTICS + BARIATRIC MULTI PO) Take by mouth     • Prenatal Vit-Fe Fumarate-FA (PRENATAL VITAMIN PO) Take by mouth       No current facility-administered medications on file prior to visit.        Objective   /84 (BP Location: Left arm, Patient Position: Sitting, Cuff Size: Large)   Pulse 70   Temp 97.8 °F (36.6 °C) (Temporal)   Ht 5' 6\" (1.676 m)   Wt 83 kg (183 lb)   LMP 03/03/2025 (Exact Date)   SpO2 98%   BMI 29.54 kg/m² (Reviewed)    Physical Exam  Vitals reviewed. "   Constitutional:       General: She is not in acute distress.     Appearance: Normal appearance. She is well-developed and well-groomed. She is not ill-appearing.   HENT:      Head: Normocephalic and atraumatic.      Right Ear: External ear normal.      Left Ear: External ear normal.      Nose: Nose normal.      Mouth/Throat:      Lips: Pink.      Mouth: Mucous membranes are moist.      Pharynx: Oropharynx is clear.   Eyes:      General: Lids are normal.      Extraocular Movements: Extraocular movements intact.      Conjunctiva/sclera: Conjunctivae normal.      Pupils: Pupils are equal, round, and reactive to light.   Neck:      Thyroid: No thyromegaly or thyroid tenderness.      Trachea: Trachea and phonation normal.   Cardiovascular:      Rate and Rhythm: Normal rate and regular rhythm.      Pulses:           Radial pulses are 2+ on the right side and 2+ on the left side.        Dorsalis pedis pulses are 2+ on the right side and 2+ on the left side.        Posterior tibial pulses are 2+ on the right side and 2+ on the left side.      Heart sounds: Normal heart sounds. No murmur heard.  Pulmonary:      Effort: Pulmonary effort is normal.      Breath sounds: Normal breath sounds.   Abdominal:      General: Abdomen is flat. Bowel sounds are normal. There is no distension.      Palpations: Abdomen is soft.      Tenderness: There is no abdominal tenderness.   Musculoskeletal:         General: Normal range of motion.      Cervical back: Neck supple.      Right lower leg: No edema.      Left lower leg: No edema.   Skin:     General: Skin is warm and dry.      Capillary Refill: Capillary refill takes less than 2 seconds.   Neurological:      General: No focal deficit present.      Mental Status: She is alert and oriented to person, place, and time.      Cranial Nerves: Cranial nerves 2-12 are intact.   Psychiatric:         Mood and Affect: Mood normal.         Behavior: Behavior normal. Behavior is cooperative.

## 2025-03-25 ENCOUNTER — PROCEDURE VISIT (OUTPATIENT)
Age: 35
End: 2025-03-25
Payer: COMMERCIAL

## 2025-03-25 VITALS — WEIGHT: 183 LBS | HEIGHT: 66 IN | BODY MASS INDEX: 29.41 KG/M2

## 2025-03-25 DIAGNOSIS — N75.0 BARTHOLIN CYST: Primary | ICD-10-CM

## 2025-03-25 PROCEDURE — 56420 I&D BARTHOLINS GLAND ABSCESS: CPT | Performed by: OBSTETRICS & GYNECOLOGY

## 2025-03-26 PROBLEM — E66.3 OVERWEIGHT (BMI 25.0-29.9): Status: ACTIVE | Noted: 2019-08-05

## 2025-03-26 NOTE — PROGRESS NOTES
Incision and drain    Date/Time: 3/25/2025 3:00 PM    Performed by: Meghan Navarro MD  Authorized by: Meghan Navarro MD  Universal Protocol:  Consent: Verbal consent obtained.  Risks and benefits: risks, benefits and alternatives were discussed  Consent given by: patient  Patient understanding: patient states understanding of the procedure being performed  Patient identity confirmed: verbally with patient    Patient location:  Clinic  Location:     Type:  Bartholin cyst    Size:  3cm    Location:  Anogenital    Anogenital location:  Bartholin's gland  Pre-procedure details:     Skin preparation:  Betadine  Anesthesia (see MAR for exact dosages):     Anesthesia method:  Local infiltration    Local anesthetic:  Lidocaine 1% WITH epi  Procedure details:     Complexity:  Simple    Incision types:  Stab incision    Scalpel blade:  11    Approach:  Puncture    Incision depth:  Subcutaneous    Drainage:  Serous    Drainage amount:  Copious    Wound treatment:  Wound left open  Post-procedure details:     Patient tolerance of procedure:  Tolerated well, no immediate complications

## 2025-04-02 ENCOUNTER — TELEPHONE (OUTPATIENT)
Age: 35
End: 2025-04-02

## 2025-04-02 DIAGNOSIS — Z12.12 ENCOUNTER FOR COLORECTAL CANCER SCREENING: Primary | ICD-10-CM

## 2025-04-02 DIAGNOSIS — Z12.11 ENCOUNTER FOR COLORECTAL CANCER SCREENING: Primary | ICD-10-CM

## 2025-04-08 ENCOUNTER — TELEPHONE (OUTPATIENT)
Age: 35
End: 2025-04-08

## 2025-04-08 DIAGNOSIS — Z12.11 ENCOUNTER FOR COLORECTAL CANCER SCREENING: ICD-10-CM

## 2025-04-08 DIAGNOSIS — Z12.12 ENCOUNTER FOR COLORECTAL CANCER SCREENING: ICD-10-CM

## 2025-04-08 NOTE — TELEPHONE ENCOUNTER
Pt rescheduled 4/9/25 procedure to 4/21/25 but already mixed the golytely to start it today. Needs new Rx.

## 2025-04-08 NOTE — TELEPHONE ENCOUNTER
Procedure rescheduled from 4/9/2025    Scheduled date of colonoscopy (as of today): Mon 4/21/25  Physician performing colonoscopy:Dr. Alcazar  Location of colonoscopy:[facility]  Bowel prep reviewed with patient:Golytely - patient aware  Instructions reviewed with patient by:RASHAD   Clearances: N/A

## 2025-04-11 ENCOUNTER — OFFICE VISIT (OUTPATIENT)
Age: 35
End: 2025-04-11
Payer: COMMERCIAL

## 2025-04-11 ENCOUNTER — NURSE TRIAGE (OUTPATIENT)
Age: 35
End: 2025-04-11

## 2025-04-11 VITALS — DIASTOLIC BLOOD PRESSURE: 82 MMHG | SYSTOLIC BLOOD PRESSURE: 122 MMHG | WEIGHT: 184.8 LBS | BODY MASS INDEX: 29.83 KG/M2

## 2025-04-11 DIAGNOSIS — N75.0 BARTHOLIN CYST: Primary | ICD-10-CM

## 2025-04-11 PROCEDURE — 56420 I&D BARTHOLINS GLAND ABSCESS: CPT | Performed by: OBSTETRICS & GYNECOLOGY

## 2025-04-11 RX ORDER — AMOXICILLIN AND CLAVULANATE POTASSIUM 500; 125 MG/1; MG/1
1 TABLET, FILM COATED ORAL EVERY 12 HOURS SCHEDULED
Qty: 14 TABLET | Refills: 0 | Status: SHIPPED | OUTPATIENT
Start: 2025-04-11 | End: 2025-04-18

## 2025-04-11 RX ORDER — FLUCONAZOLE 150 MG/1
150 TABLET ORAL EVERY OTHER DAY
Qty: 2 TABLET | Refills: 0 | Status: SHIPPED | OUTPATIENT
Start: 2025-04-11 | End: 2025-04-14

## 2025-04-11 NOTE — PROGRESS NOTES
"Incision and drain    Date/Time: 4/11/2025 11:00 AM    Performed by: Kimmy Back MD  Authorized by: Kimmy Back MD  [place] Protocol:  procedure performed by consultantConsent: Verbal consent obtained.  Risks and benefits: risks, benefits and alternatives were discussed  Consent given by: patient  Time out: Immediately prior to procedure a \"time out\" was called to verify the correct patient, procedure, equipment, support staff and site/side marked as required.  Patient understanding: patient states understanding of the procedure being performed  Required items: required blood products, implants, devices, and special equipment available  Patient identity confirmed: verbally with patient    Patient location:  Clinic  Location:     Type:  Bartholin cyst (Left)    Location:  Anogenital    Anogenital location:  Bartholin's gland  Pre-procedure details:     Skin preparation:  Betadine  Anesthesia (see MAR for exact dosages):     Anesthesia method:  Topical application and local infiltration    Topical anesthetic:  EMLA cream    Local anesthetic:  Lidocaine 2% WITH epi  Procedure details:     Complexity:  Complex    Needle aspiration: no      Incision types:  Elliptical    Scalpel blade:  11    Approach:  Open    Incision depth:  Submucosal    Wound management:  Probed and deloculated and irrigated with saline    Irrigation with saline:  Yes - 20 mL    Drainage:  Bloody and purulent    Drainage amount:  Copious    Wound treatment:  Packing placed    Packing materials:  1/4 in gauze  Post-procedure details:     Patient tolerance of procedure:  Tolerated well, no immediate complications  Comments:      Wound care instructions given  RTO next week for recheck and preop for possible marsupialization     "

## 2025-04-11 NOTE — TELEPHONE ENCOUNTER
"FOLLOW UP: Appt made for today.    REASON FOR CONVERSATION: Bartholin's Cyst    SYMPTOMS: Bartholin cyst    OTHER: Had it drained 3/25 but it has returned.  Started again 2 days ago.     DISPOSITION: See Within 3 Days in Office      Reason for Disposition   Tender lump (swelling or \"ball\") at vaginal opening    Answer Assessment - Initial Assessment Questions  1. SYMPTOM: \"What's the main symptom you're concerned about?\" (e.g., pain, itching, dryness)      Bartholin cyst  3. ONSET: \"When did this  start?\"      2 days ago   4. PAIN: \"Is there any pain?\" If Yes, ask: \"How bad is it?\" (Scale: 1-10; mild, moderate, severe)      mild  5. ITCHING: \"Is there any itching?\" If Yes, ask: \"How bad is it?\" (Scale: 1-10; mild, moderate, severe)      denies  6. CAUSE: \"What do you think is causing the discharge?\" \"Have you had the same problem before?\" \"What happened then?\"      bartholin  7. OTHER SYMPTOMS: \"Do you have any other symptoms?\" (e.g., fever, itching, vaginal bleeding, pain with urination, injury to genital area, vaginal foreign body)      denies  8. PREGNANCY: \"Is there any chance you are pregnant?\" \"When was your last menstrual period?\"      4/1    Protocols used: Vaginal Symptoms-Adult-[place]    "

## 2025-04-16 ENCOUNTER — NURSE TRIAGE (OUTPATIENT)
Age: 35
End: 2025-04-16

## 2025-04-16 NOTE — TELEPHONE ENCOUNTER
"FOLLOW UP: Discuss with provider and call patient back.    REASON FOR CONVERSATION: Vaginitis    SYMPTOMS: Vaginal itching, irritation, increase in white vaginal discharge. Denies odor.     OTHER: Symptoms started 4/13. Was prescribed Diflucan after incision and drainage of Bartholin cyst on 4/11 in the event she would have symptoms of yeast infection. Patient took first dose on 4/13 and second dose this morning 4/16. Patient states symptoms are not improving. Advised it could take a few more days for symptoms to resolve after taking but would send to provider to see if anything additional is recommended at this time.     DISPOSITION: Discuss with Provider and Call Back Patient      Answer Assessment - Initial Assessment Questions  1. SYMPTOM: \"What's the main symptom you're concerned about?\" (e.g., pain, itching, dryness)      Vaginal itching, increase in white vaginal discharge, vaginal discomfort. Denies odor.  2. LOCATION: \"Where is the  symptoms located?\" (e.g., inside/outside, left/right)      vagina  3. ONSET: \"When did the  symptoms  start?\"      4/13/25  4. PAIN: \"Is there any pain?\" If Yes, ask: \"How bad is it?\" (Scale: 1-10; mild, moderate, severe)      Discomfort and irritation  5. ITCHING: \"Is there any itching?\" If Yes, ask: \"How bad is it?\" (Scale: 1-10; mild, moderate, severe)      Yes, itching  6. CAUSE: \"What do you think is causing the discharge?\" \"Have you had the same problem before?\" \"What happened then?\"      Yeast infection  7. OTHER SYMPTOMS: \"Do you have any other symptoms?\" (e.g., fever, itching, vaginal bleeding, pain with urination, injury to genital area, vaginal foreign body)      denies  8. PREGNANCY: \"Is there any chance you are pregnant?\" \"When was your last menstrual period?\"      denies    Protocols used: Vaginal Symptoms-Adult-[place]    "

## 2025-04-17 NOTE — TELEPHONE ENCOUNTER
Spoke with patient to review recommendations per Dr. Torrez. Patient verbalizes understanding.   Good

## 2025-04-18 ENCOUNTER — CONSULT (OUTPATIENT)
Age: 35
End: 2025-04-18

## 2025-04-18 VITALS
HEIGHT: 66 IN | BODY MASS INDEX: 29.57 KG/M2 | SYSTOLIC BLOOD PRESSURE: 120 MMHG | DIASTOLIC BLOOD PRESSURE: 80 MMHG | WEIGHT: 184 LBS

## 2025-04-18 DIAGNOSIS — N75.0 BARTHOLIN CYST: Primary | ICD-10-CM

## 2025-04-18 PROCEDURE — 99024 POSTOP FOLLOW-UP VISIT: CPT | Performed by: OBSTETRICS & GYNECOLOGY

## 2025-04-18 NOTE — ASSESSMENT & PLAN NOTE
- Packing replaced  - Will plan for:   EUA, Possible Marsupialization of Left Bartholin Gland versus Cyst Excision.   - Discussed if nothing is palpable on day of surgery we may not proceed.   - Discussed risks of procedure:  bleeding, infection, damage to surrounding structures, scar tissue formation, recurrence.   Surgical consents signed, scheduling request sent.

## 2025-04-18 NOTE — PROGRESS NOTES
Name: Elsa Carvajal      : 1990      MRN: [id number]  Encounter Provider: Meghan Navarro MD  Encounter Date: 2025   Encounter department: [facility] [place] [facility]  :  Assessment & Plan  Bartholin cyst    - Packing replaced  - Will plan for:   EUA, Possible Marsupialization of Left Bartholin Gland versus Cyst Excision.   - Discussed if nothing is palpable on day of surgery we may not proceed.   - Discussed risks of procedure:  bleeding, infection, damage to surrounding structures, scar tissue formation, recurrence.   Surgical consents signed, scheduling request sent.              History of Present Illness   Elsa presents for follow up of Bartholin Cyst.   Had recurrence - was drained and packed last visit.   Packing has been slowly falling out, and she has been cutting the packing.       Elsa Carvajal is a 35 y.o. female who presents for follow up.  History obtained from: patient    Review of Systems  Past Medical History   Past Medical History:   Diagnosis Date    Abnormal Pap smear of cervix     Allergic     Allergic rhinitis     Bartholin cyst     COVID-19 2022    mild s/s, fully vaccinated    Dizziness     Ear problems     GERD (gastroesophageal reflux disease)     HPV in female     Obesity     Otitis media     PCOS (polycystic ovarian syndrome) 2019    Polycystic ovary syndrome     Tinnitus     ringing in ears    Tonsillitis     Urinary tract infection     Varicella     Wears glasses      Past Surgical History:   Procedure Laterality Date    BARIATRIC SURGERY  23    ENDOSCOPIC ULTRASOUND (UPPER)      EXAMINATION UNDER ANESTHESIA N/A 2020    Procedure: EXAM UNDER ANESTHESIA (EUA);  Surgeon: Meghan Navarro MD;  [facility]: [facility];  Service: Gynecology    MYRINGOTOMY Bilateral 1996    NE LAPS GSTRC RSTRICTIV PX LONGITUDINAL GASTRECTOMY N/A 2023    Procedure: GASTRECTOMY  SLEEVE W/ ROBOT;  Surgeon: Juan Pablo Pimentel MD;  Location: [facility];   Service: Bariatrics    [place] MARSUPIALIZATION BARTHOLINS GLAND CYST N/A 09/18/2020    Procedure: MARSUPILIZATION BARTHOLIN CYST;  Surgeon: Meghan Navarro MD;  [facility];  Service: Gynecology    SLEEVE GASTROPLASTY  7/11/23    WISDOM TOOTH EXTRACTION       Family History   Problem Relation Age of Onset    Drug abuse Mother         suicide 2011    Alcohol abuse Mother     Depression Mother     Early death Mother     Mental illness Mother     Substance Abuse Mother     Bipolar disorder Mother     Completed Suicide  Mother     Psychiatric Illness Mother     Hypertension Father     Diabetes Father     Asthma Father     Gestational diabetes Sister         twin    Asthma Sister     Mental illness Sister     Substance Abuse Sister     Alcohol abuse Sister     Depression Sister     No Known Problems Sister     ADD / ADHD Brother     No Known Problems Brother     Diabetes Maternal Grandmother     Heart failure Maternal Grandmother     Diabetes Paternal Grandmother     Diabetes Paternal Grandfather     Heart failure Paternal Grandfather     Coronary artery disease Family     Diabetes Family     Colon cancer Neg Hx     Ovarian cancer Neg Hx     Breast cancer Neg Hx       reports that she has never smoked. She has been exposed to tobacco smoke. She has never used smokeless tobacco. She reports that she does not currently use alcohol after a past usage of about 1.0 standard drink of alcohol per week. She reports that she does not use drugs.  Current Outpatient Medications   Medication Instructions    amoxicillin-clavulanate (AUGMENTIN) 500-125 mg per tablet 1 tablet, Oral, Every 12 hours scheduled    calcium carbonate (OS-WILLEM) 600 mg, 2 times daily with meals    cetirizine (ZYRTEC) 10 mg, Daily PRN    fluticasone (FLONASE) 50 mcg/act nasal spray 1 spray, Nasal, Daily    linaCLOtide 145 mcg, Oral, Daily before breakfast    meclizine (ANTIVERT) 25 mg, Oral, 3 times daily PRN    methocarbamol (ROBAXIN) 500 mg, Oral,  "Daily at bedtime PRN    Multiple Vitamins-Minerals (PROBIOTICS + BARIATRIC MULTI PO) Take by mouth    polyethylene glycol (GOLYTELY) 4000 mL solution 4,000 mL, Oral, Once    Prenatal Vit-Fe Fumarate-FA (PRENATAL VITAMIN PO) Take by mouth     Allergies   Allergen Reactions    Shrimp Extract Allergy Skin Test - Food Allergy Hives    Other Other (See Comments)     Environmental         Objective   /80   Ht 5' 6\" (1.676 m)   Wt 83.5 kg (184 lb)   LMP 04/01/2025 (Exact Date)   BMI 29.70 kg/m²      Physical Exam  Vitals reviewed.   Constitutional:       General: She is not in acute distress.     Appearance: Normal appearance. She is not ill-appearing.   HENT:      Head: Normocephalic and atraumatic.   Pulmonary:      Effort: No respiratory distress.   Genitourinary:     General: Normal vulva.      Vagina: No vaginal discharge.       Musculoskeletal:      Right lower leg: No edema.      Left lower leg: No edema.   Neurological:      Mental Status: She is alert and oriented to person, place, and time.   Psychiatric:         Mood and Affect: Mood normal.         Behavior: Behavior normal.           "

## 2025-04-18 NOTE — H&P (VIEW-ONLY)
Name: Elsa Carvajal      : 1990      MRN: [id number]  Encounter Provider: Meghan Navarro MD  Encounter Date: 2025   Encounter department: [facility] [place] [facility]  :  Assessment & Plan  Bartholin cyst    - Packing replaced  - Will plan for:   EUA, Possible Marsupialization of Left Bartholin Gland versus Cyst Excision.   - Discussed if nothing is palpable on day of surgery we may not proceed.   - Discussed risks of procedure:  bleeding, infection, damage to surrounding structures, scar tissue formation, recurrence.   Surgical consents signed, scheduling request sent.              History of Present Illness   Elsa presents for follow up of Bartholin Cyst.   Had recurrence - was drained and packed last visit.   Packing has been slowly falling out, and she has been cutting the packing.       Elsa Carvajal is a 35 y.o. female who presents for follow up.  History obtained from: patient    Review of Systems  Past Medical History   Past Medical History:   Diagnosis Date    Abnormal Pap smear of cervix     Allergic     Allergic rhinitis     Bartholin cyst     COVID-19 2022    mild s/s, fully vaccinated    Dizziness     Ear problems     GERD (gastroesophageal reflux disease)     HPV in female     Obesity     Otitis media     PCOS (polycystic ovarian syndrome) 2019    Polycystic ovary syndrome     Tinnitus     ringing in ears    Tonsillitis     Urinary tract infection     Varicella     Wears glasses      Past Surgical History:   Procedure Laterality Date    BARIATRIC SURGERY  23    ENDOSCOPIC ULTRASOUND (UPPER)      EXAMINATION UNDER ANESTHESIA N/A 2020    Procedure: EXAM UNDER ANESTHESIA (EUA);  Surgeon: Meghan Navarro MD;  [facility];  Service: Gynecology    MYRINGOTOMY Bilateral 1996    [place] LAPS GSTRC RSTRICTIV PX LONGITUDINAL GASTRECTOMY N/A 2023    Procedure: GASTRECTOMY  SLEEVE W/ ROBOT;  Surgeon: Juan Pablo Pimentel MD;  [facility]: [facility];   Service: Bariatrics    [place] MARSUPIALIZATION BARTHOLINS GLAND CYST N/A 09/18/2020    Procedure: MARSUPILIZATION BARTHOLIN CYST;  Surgeon: Meghan Navarro MD;  [facility];  Service: Gynecology    SLEEVE GASTROPLASTY  7/11/23    WISDOM TOOTH EXTRACTION       Family History   Problem Relation Age of Onset    Drug abuse Mother         suicide 2011    Alcohol abuse Mother     Depression Mother     Early death Mother     Mental illness Mother     Substance Abuse Mother     Bipolar disorder Mother     Completed Suicide  Mother     Psychiatric Illness Mother     Hypertension Father     Diabetes Father     Asthma Father     Gestational diabetes Sister         twin    Asthma Sister     Mental illness Sister     Substance Abuse Sister     Alcohol abuse Sister     Depression Sister     No Known Problems Sister     ADD / ADHD Brother     No Known Problems Brother     Diabetes Maternal Grandmother     Heart failure Maternal Grandmother     Diabetes Paternal Grandmother     Diabetes Paternal Grandfather     Heart failure Paternal Grandfather     Coronary artery disease Family     Diabetes Family     Colon cancer Neg Hx     Ovarian cancer Neg Hx     Breast cancer Neg Hx       reports that she has never smoked. She has been exposed to tobacco smoke. She has never used smokeless tobacco. She reports that she does not currently use alcohol after a past usage of about 1.0 standard drink of alcohol per week. She reports that she does not use drugs.  Current Outpatient Medications   Medication Instructions    amoxicillin-clavulanate (AUGMENTIN) 500-125 mg per tablet 1 tablet, Oral, Every 12 hours scheduled    calcium carbonate (OS-WILLEM) 600 mg, 2 times daily with meals    cetirizine (ZYRTEC) 10 mg, Daily PRN    fluticasone (FLONASE) 50 mcg/act nasal spray 1 spray, Nasal, Daily    linaCLOtide 145 mcg, Oral, Daily before breakfast    meclizine (ANTIVERT) 25 mg, Oral, 3 times daily PRN    methocarbamol (ROBAXIN) 500 mg, Oral,  "Daily at bedtime PRN    Multiple Vitamins-Minerals (PROBIOTICS + BARIATRIC MULTI PO) Take by mouth    polyethylene glycol (GOLYTELY) 4000 mL solution 4,000 mL, Oral, Once    Prenatal Vit-Fe Fumarate-FA (PRENATAL VITAMIN PO) Take by mouth     Allergies   Allergen Reactions    Shrimp Extract Allergy Skin Test - Food Allergy Hives    Other Other (See Comments)     Environmental         Objective   /80   Ht 5' 6\" (1.676 m)   Wt 83.5 kg (184 lb)   LMP 04/01/2025 (Exact Date)   BMI 29.70 kg/m²      Physical Exam  Vitals reviewed.   Constitutional:       General: She is not in acute distress.     Appearance: Normal appearance. She is not ill-appearing.   HENT:      Head: Normocephalic and atraumatic.   Pulmonary:      Effort: No respiratory distress.   Genitourinary:     General: Normal vulva.      Vagina: No vaginal discharge.       Musculoskeletal:      Right lower leg: No edema.      Left lower leg: No edema.   Neurological:      Mental Status: She is alert and oriented to person, place, and time.   Psychiatric:         Mood and Affect: Mood normal.         Behavior: Behavior normal.           "

## 2025-04-22 ENCOUNTER — TELEPHONE (OUTPATIENT)
Age: 35
End: 2025-04-22

## 2025-04-23 NOTE — TELEPHONE ENCOUNTER
"Called and spoke with the patient and advised that 5/2 is the first day that Dr. Navarro is available for surgery. Patient concerned that she will not be able to perform the surgery if she waits until next week. Read last office note to patient \" Discussed if nothing is palpable on day of surgery we may not proceed. \" Patient understands and message was sent to Dr. Navarro who is agreeable to 5/2 for date of surgery.   "

## 2025-04-23 NOTE — TELEPHONE ENCOUNTER
Patient calling to make sure  is okay with patient waiting until 5/2/25 to have procedure done and that it is not to far away. Please reach out to patient to discuss. Patient already spoke with Kirsten surgery scheduler.

## 2025-04-25 ENCOUNTER — TELEPHONE (OUTPATIENT)
Age: 35
End: 2025-04-25

## 2025-04-25 NOTE — TELEPHONE ENCOUNTER
----- Message from Meghan Navarro MD sent at 2025  3:41 PM EDT -----  [facility] GYN Department  Surgery Scheduling Sheet    Patient Name: Elsa Carvajal  : 1990    Provider: Meghan Navarro MD     Needed: no; Language: N/A    Procedure: exam under anesthesia and possible marsupialization of bartholin gland cyst versus cyst excision (LEFT)    Diagnosis: recurrent bartholin cyst    Special Needs or Equipment: none    Anesthesia: IV sedation with anesthesia    Length of stay: outpatient  Does patient have comorbid conditions that will require close perioperative monitoring prior to safe discharge: no    The patient has comorbid conditions that will require close perioperative monitoring prior to safe discharge, including N/A.   This may require acute care beyond the usual and routine recovery period. As such, inpatient admission post-operatively is expected and appropriate, and anticipated hospital length of stay will be >2 midnights.    Pre-Admission Testing Needed: no   Labs that should be ordered: NA    Order PAT that is recommended in prep for procedure?: Not Indicated    Medical Clearance Needed: no; Provider: N/A    MA Form Signed (tubals/hysterectomy): Not Indicated    Surgical Drink Given: no     How many days out of work: 2 day(s)     How many days no drivin day(s)       Is pre op appt needed?  no  Interval for post op appt: 2 week(s)       For Surgical Scheduler:     Surgery Scheduled On:  [place]: [facility]    Pre-op Appt:   Post op Appt:  Consult/Medical clearance appt:

## 2025-04-30 ENCOUNTER — ANESTHESIA EVENT (OUTPATIENT)
Dept: PERIOP | Facility: HOSPITAL | Age: 35
End: 2025-04-30
Payer: COMMERCIAL

## 2025-04-30 NOTE — PRE-PROCEDURE INSTRUCTIONS
Pre-Surgery Instructions:   Medication Instructions    calcium carbonate (OS-WILLEM) 600 MG tablet Hold day of surgery.    cetirizine (ZyrTEC) 10 mg tablet Uses PRN- OK to take day of surgery    fluticasone (FLONASE) 50 mcg/act nasal spray Take day of surgery.    linaCLOtide 145 MCG CAPS Take day of surgery.    meclizine (ANTIVERT) 25 mg tablet Uses PRN- OK to take day of surgery    methocarbamol (ROBAXIN) 500 mg tablet Uses PRN- OK to take day of surgery    Multiple Vitamins-Minerals (PROBIOTICS + BARIATRIC MULTI PO) Hold day of surgery.    Prenatal Vit-Fe Fumarate-FA (PRENATAL VITAMIN PO) Hold day of surgery.   Medication instructions for day of surgery reviewed. Please take all instructed medications with only a sip of water.       You will receive a call one business day prior to surgery with an arrival time and hospital directions. If your surgery is scheduled on a Monday, the hospital will be calling you on the Friday prior to your surgery. If you have not heard from anyone by 8pm, please call the hospital supervisor through the hospital  at [phone]. ([place] [phone] or [place] [phone]).    Do not eat or drink anything after midnight the night before your surgery, including candy, mints, lifesavers, or chewing gum. Do not drink alcohol 24hrs before your surgery. Try not to smoke at least 24hrs before your surgery.       Follow the pre surgery showering instructions as listed in the “My Surgical Experience Booklet” or otherwise provided by your surgeon's office. Do not use a blade to shave the surgical area 1 week before surgery. It is okay to use a clean electric clippers up to 24 hours before surgery. Do not apply any lotions, creams, including makeup, cologne, deodorant, or perfumes after showering on the day of your surgery. Do not use dry shampoo, hair spray, hair gel, or any type of hair products.     No contact lenses, eye make-up, or artificial eyelashes. Remove nail polish,  including gel polish, and any artificial, gel, or acrylic nails if possible. Remove all jewelry including rings and body piercing jewelry.     Wear causal clothing that is easy to take on and off. Consider your type of surgery.    Keep any valuables, jewelry, piercings at home. Please bring any specially ordered equipment (sling, braces) if indicated.    Arrange for a responsible person to drive you to and from the hospital on the day of your surgery. Please confirm the visitor policy for the day of your procedure when you receive your phone call with an arrival time.     Call the surgeon's office with any new illnesses, exposures, or additional questions prior to surgery.    Please reference your “My Surgical Experience Booklet” for additional information to prepare for your upcoming surgery.

## 2025-05-02 ENCOUNTER — HOSPITAL ENCOUNTER (OUTPATIENT)
Facility: HOSPITAL | Age: 35
Setting detail: OUTPATIENT SURGERY
Discharge: HOME | End: 2025-05-02
Attending: OBSTETRICS & GYNECOLOGY | Admitting: OBSTETRICS & GYNECOLOGY
Payer: COMMERCIAL

## 2025-05-02 ENCOUNTER — ANESTHESIA (OUTPATIENT)
Dept: PERIOP | Facility: HOSPITAL | Age: 35
End: 2025-05-02
Payer: COMMERCIAL

## 2025-05-02 VITALS
WEIGHT: 184 LBS | BODY MASS INDEX: 29.57 KG/M2 | HEART RATE: 64 BPM | SYSTOLIC BLOOD PRESSURE: 113 MMHG | HEIGHT: 66 IN | RESPIRATION RATE: 12 BRPM | DIASTOLIC BLOOD PRESSURE: 66 MMHG | OXYGEN SATURATION: 95 % | TEMPERATURE: 97.5 F

## 2025-05-02 LAB
EXT PREGNANCY TEST URINE: NEGATIVE
EXT. CONTROL: NORMAL

## 2025-05-02 PROCEDURE — 81025 URINE PREGNANCY TEST: CPT | Performed by: OBSTETRICS & GYNECOLOGY

## 2025-05-02 PROCEDURE — 56405 I&D VULVA/PERINEAL ABSCESS: CPT | Performed by: OBSTETRICS & GYNECOLOGY

## 2025-05-02 PROCEDURE — 56440 MRSPLZATN BRTHLNS GLND CST: CPT | Performed by: OBSTETRICS & GYNECOLOGY

## 2025-05-02 RX ORDER — LIDOCAINE HYDROCHLORIDE AND EPINEPHRINE 10; 10 MG/ML; UG/ML
INJECTION, SOLUTION INFILTRATION; PERINEURAL AS NEEDED
Status: DISCONTINUED | OUTPATIENT
Start: 2025-05-02 | End: 2025-05-02 | Stop reason: HOSPADM

## 2025-05-02 RX ORDER — OXYCODONE HYDROCHLORIDE 5 MG/1
5 TABLET ORAL EVERY 4 HOURS PRN
Refills: 0 | Status: CANCELLED | OUTPATIENT
Start: 2025-05-02

## 2025-05-02 RX ORDER — PROPOFOL 10 MG/ML
INJECTION, EMULSION INTRAVENOUS CONTINUOUS PRN
Status: DISCONTINUED | OUTPATIENT
Start: 2025-05-02 | End: 2025-05-02

## 2025-05-02 RX ORDER — DEXAMETHASONE SODIUM PHOSPHATE 10 MG/ML
INJECTION, SOLUTION INTRAMUSCULAR; INTRAVENOUS AS NEEDED
Status: DISCONTINUED | OUTPATIENT
Start: 2025-05-02 | End: 2025-05-02

## 2025-05-02 RX ORDER — FENTANYL CITRATE/PF 50 MCG/ML
50 SYRINGE (ML) INJECTION
Status: DISCONTINUED | OUTPATIENT
Start: 2025-05-02 | End: 2025-05-02 | Stop reason: HOSPADM

## 2025-05-02 RX ORDER — SODIUM CHLORIDE, SODIUM LACTATE, POTASSIUM CHLORIDE, CALCIUM CHLORIDE 600; 310; 30; 20 MG/100ML; MG/100ML; MG/100ML; MG/100ML
20 INJECTION, SOLUTION INTRAVENOUS CONTINUOUS
Status: DISCONTINUED | OUTPATIENT
Start: 2025-05-02 | End: 2025-05-02 | Stop reason: HOSPADM

## 2025-05-02 RX ORDER — MIDAZOLAM HYDROCHLORIDE 2 MG/2ML
INJECTION, SOLUTION INTRAMUSCULAR; INTRAVENOUS AS NEEDED
Status: DISCONTINUED | OUTPATIENT
Start: 2025-05-02 | End: 2025-05-02

## 2025-05-02 RX ORDER — METOCLOPRAMIDE HYDROCHLORIDE 5 MG/ML
10 INJECTION INTRAMUSCULAR; INTRAVENOUS ONCE AS NEEDED
Status: DISCONTINUED | OUTPATIENT
Start: 2025-05-02 | End: 2025-05-02 | Stop reason: HOSPADM

## 2025-05-02 RX ORDER — ONDANSETRON 2 MG/ML
INJECTION INTRAMUSCULAR; INTRAVENOUS AS NEEDED
Status: DISCONTINUED | OUTPATIENT
Start: 2025-05-02 | End: 2025-05-02

## 2025-05-02 RX ORDER — KETOROLAC TROMETHAMINE 30 MG/ML
INJECTION, SOLUTION INTRAMUSCULAR; INTRAVENOUS AS NEEDED
Status: DISCONTINUED | OUTPATIENT
Start: 2025-05-02 | End: 2025-05-02

## 2025-05-02 RX ORDER — FENTANYL CITRATE 50 UG/ML
INJECTION, SOLUTION INTRAMUSCULAR; INTRAVENOUS AS NEEDED
Status: DISCONTINUED | OUTPATIENT
Start: 2025-05-02 | End: 2025-05-02

## 2025-05-02 RX ORDER — IBUPROFEN 600 MG/1
600 TABLET, FILM COATED ORAL EVERY 6 HOURS PRN
Status: CANCELLED | OUTPATIENT
Start: 2025-05-02

## 2025-05-02 RX ORDER — ONDANSETRON 2 MG/ML
4 INJECTION INTRAMUSCULAR; INTRAVENOUS ONCE AS NEEDED
Status: DISCONTINUED | OUTPATIENT
Start: 2025-05-02 | End: 2025-05-02 | Stop reason: HOSPADM

## 2025-05-02 RX ORDER — ACETAMINOPHEN 325 MG/1
975 TABLET ORAL EVERY 6 HOURS PRN
Status: CANCELLED | OUTPATIENT
Start: 2025-05-02

## 2025-05-02 RX ORDER — PROPOFOL 10 MG/ML
INJECTION, EMULSION INTRAVENOUS AS NEEDED
Status: DISCONTINUED | OUTPATIENT
Start: 2025-05-02 | End: 2025-05-02

## 2025-05-02 RX ADMIN — PROPOFOL 50 MG: 10 INJECTION, EMULSION INTRAVENOUS at 08:24

## 2025-05-02 RX ADMIN — PROPOFOL 140 MCG/KG/MIN: 10 INJECTION, EMULSION INTRAVENOUS at 08:24

## 2025-05-02 RX ADMIN — FENTANYL CITRATE 50 MCG: 50 INJECTION, SOLUTION INTRAMUSCULAR; INTRAVENOUS at 08:24

## 2025-05-02 RX ADMIN — FENTANYL CITRATE 25 MCG: 50 INJECTION, SOLUTION INTRAMUSCULAR; INTRAVENOUS at 08:30

## 2025-05-02 RX ADMIN — KETOROLAC TROMETHAMINE 15 MG: 30 INJECTION, SOLUTION INTRAMUSCULAR; INTRAVENOUS at 08:51

## 2025-05-02 RX ADMIN — MIDAZOLAM 2 MG: 1 INJECTION INTRAMUSCULAR; INTRAVENOUS at 08:19

## 2025-05-02 RX ADMIN — ONDANSETRON 4 MG: 2 INJECTION INTRAMUSCULAR; INTRAVENOUS at 08:28

## 2025-05-02 RX ADMIN — DEXAMETHASONE SODIUM PHOSPHATE 10 MG: 10 INJECTION, SOLUTION INTRAMUSCULAR; INTRAVENOUS at 08:28

## 2025-05-02 RX ADMIN — FENTANYL CITRATE 25 MCG: 50 INJECTION, SOLUTION INTRAMUSCULAR; INTRAVENOUS at 08:42

## 2025-05-02 RX ADMIN — SODIUM CHLORIDE, SODIUM LACTATE, POTASSIUM CHLORIDE, AND CALCIUM CHLORIDE: .6; .31; .03; .02 INJECTION, SOLUTION INTRAVENOUS at 07:30

## 2025-05-02 NOTE — OP NOTE
OPERATIVE REPORT  PATIENT NAME: Elsa Carvajal    :  1990  MRN: [id number]  Pt Location: [facility] OR ROOM 04    SURGERY DATE: 2025    Surgeons and Role:     * Meghan Navarro MD - Primary    Preop Diagnosis:  Bartholin cyst [N75.0]    Post-Op Diagnosis Codes:     * Bartholin cyst [N75.0]    Procedure(s):  EXAM UNDER ANESTHESIA  Left - MARSUPILIZATION OF BARTHOLIN GLAND CYST. LEFT LABIAL CYST INCISED    Specimen(s):  * No specimens in log *    Estimated Blood Loss:   Minimal    Drains:  * No LDAs found *    Anesthesia Type:   IV Sedation with Anesthesia    Operative Indications:  Bartholin cyst [N75.0]  Left labial cyst    Operative Findings:  Small bartholin palpable on left  Small left labial cyst    Complications:   None    Procedure and Technique:    The patient was properly identified in the holding area by the operating room staff and attending physician. She was taken to the operating room where IV sedation anesthesia was instituted without complications. She was placed in the dorsal lithotomy position with the legs in Eduard stirrups with care taken to avoid excessive flexion or extension of her lower extremities. Pressure points were padded. The patient was prepped and draped in normal sterile fashion. A time-out was taken. The patient was again properly identified by the operating room staff and the attending physician.     The bartholin gland cyst was identified and an edge grasped using an michel forcep.  Lidocaine with epinephrine was infiltrated in this area.   The prior incision was opened further and dissected to the level of the cyst wall which drained a small amount of mucoid fluid.  The cyst wall was then grasped on either side and sutured to the level of the skin using interrupted sutures of 3-0 vicryl circumferentially.  Hemostasis was noted.  Quarter inch iodiform packing was placed into the gland.      The other small labial cyst was also opened using a scalpel with minimal drainage.   A single suture of 3-0 vicryl was placed for hemostasis.       Patient was then cleaned, removed from stirrups and brought to PACU in stable condition. Counts were noted to be correct x 2.      I was present for the entire procedure.    Patient Disposition:  PACU              SIGNATURE: Meghan Valerio MD  DATE: May 2, 2025  TIME: 9:56 AM

## 2025-05-02 NOTE — ANESTHESIA POSTPROCEDURE EVALUATION
Post-Op Assessment Note    CV Status:  Stable  Pain Score: 0    Pain management: adequate       Mental Status:  Alert and awake   Hydration Status:  Euvolemic   PONV Controlled:  Controlled   Airway Patency:  Patent     Post Op Vitals Reviewed: Yes    No anethesia notable event occurred.    Staff: CRNA           Last Filed PACU Vitals:  Vitals Value Taken Time   Temp 97.5    Pulse 72 05/02/25 0904   /56 05/02/25 0902   Resp 17    SpO2 99 % 05/02/25 0904   Vitals shown include unfiled device data.

## 2025-05-02 NOTE — INTERVAL H&P NOTE
H&P reviewed. After examining the patient I find no changes in the patients condition since the H&P had been written.    Regular heart rate.   Abdomen soft, non-tender      Vitals:    05/02/25 0716   BP: 126/69   Pulse: 74   Resp: 18   Temp: (!) 97.1 °F (36.2 °C)   SpO2: 98%

## 2025-05-02 NOTE — DISCHARGE INSTR - AVS FIRST PAGE
"Post-Gynecologic Surgery Discharge Instructions:    Call the office for fever greater than 100.4'F, heavy bleeding, worsening discharge, or increasing pain.  Activity as tolerated.      Post Operative Pain Management:  For pain, take 650 mg of tylenol every 6 hours.   For cramping, take 600 mg Ibuprofen every 6 hours to relieve.     You may alternate these and take them \"around the clock\" to stay ahead of pain. For example, take 650mg of tylenol at 9 AM, then 600mg of ibuprofen at 12 PM, then 650 of tylenol at 3 PM, and so forth.     Post Operative Bowel Management:  Please take over the counter stool softener or laxative to ensure you do not strain, as the bowels are often the last thing to wake up from anesthesia. You can take whatever is preferred (colace, senna, or miralax)      "

## 2025-05-02 NOTE — ANESTHESIA PREPROCEDURE EVALUATION
Procedure:  EXAM UNDER ANESTHESIA (Vagina )  MARSUPILIZATION OF BARTHOLIN GLAND CYST VS RESECTION (Left: Perineum)    Relevant Problems   GI/HEPATIC   (+) GERD (gastroesophageal reflux disease)      PONV   Physical Exam    Airway    Mallampati score: II  TM Distance: >3 FB  Neck ROM: full     Dental       Cardiovascular      Pulmonary      Other Findings  post-pubertal.      Anesthesia Plan  ASA Score- 2     Anesthesia Type- IV sedation with anesthesia with ASA Monitors.         Additional Monitors:     Airway Plan:            Plan Factors-    Chart reviewed.                      Induction- intravenous.    Postoperative Plan-         Informed Consent- Anesthetic plan and risks discussed with patient.  I personally reviewed this patient with the CRNA. Discussed and agreed on the Anesthesia Plan with the CRNA..      NPO Status:  Vitals Value Taken Time   Date of last liquid 05/01/25 05/02/25 0704   Time of last liquid 2130 05/02/25 0704   Date of last solid 05/01/25 05/02/25 0704   Time of last solid 1800 05/02/25 0704

## 2025-05-12 NOTE — ANESTHESIA POSTPROCEDURE EVALUATION
Post-Op Assessment Note    CV Status:  Stable  Pain Score: 0    Pain management: adequate       Mental Status:  Alert and awake   Hydration Status:  Euvolemic   PONV Controlled:  Controlled   Airway Patency:  Patent     Post Op Vitals Reviewed: Yes    No anethesia notable event occurred.    Staff: CRNA, Anesthesiologist           Last Filed PACU Vitals:  Vitals Value Taken Time   Temp 97.5    Pulse 72 05/02/25 0904   /56 05/02/25 0902   Resp 17    SpO2 99 % 05/02/25 0904   Vitals shown include unfiled device data.    Modified Elieser:     Vitals Value Taken Time   Activity 2 05/02/25 0912   Respiration 2 05/02/25 0912   Circulation 2 05/02/25 0912   Consciousness 1 05/02/25 0912   Oxygen Saturation 2 05/02/25 0912     Modified Elieser Score: 9

## 2025-05-19 ENCOUNTER — OFFICE VISIT (OUTPATIENT)
Age: 35
End: 2025-05-19

## 2025-05-19 VITALS — HEIGHT: 66 IN | WEIGHT: 184 LBS | BODY MASS INDEX: 29.57 KG/M2

## 2025-05-19 DIAGNOSIS — Z98.890 POST-OPERATIVE STATE: Primary | ICD-10-CM

## 2025-05-19 DIAGNOSIS — E28.2 PCOS (POLYCYSTIC OVARIAN SYNDROME): ICD-10-CM

## 2025-05-19 PROCEDURE — 99024 POSTOP FOLLOW-UP VISIT: CPT | Performed by: OBSTETRICS & GYNECOLOGY

## 2025-05-19 NOTE — ASSESSMENT & PLAN NOTE
Potentially interested in Clomid,  however cycles have been more normal.  Will check day 21 progesterone.     Orders:    Progesterone; Future    
not examined

## 2025-05-19 NOTE — PROGRESS NOTES
"Name: Elsa Carvajal      : 1990      MRN: [id number]  Encounter Provider: Meghan Valerio MD  Encounter Date: 2025   Encounter department: [facility] [place]  :  Assessment & Plan  Post-operative state     Patient s/p bartholin gland marsupialization.   Continue routine PP care.   Call with concerns/recurrence.        PCOS (polycystic ovarian syndrome)  Potentially interested in Clomid,  however cycles have been more normal.  Will check day 21 progesterone.     Orders:    Progesterone; Future        History of Present Illness   HPI  Elsa Carvajal is a 35 y.o. female who presents s/p bartholin gland cyst marsupialization on 2025.  Overall recovering well.  No discharge or pain.   Mild pinching sensation.  No bowel or bladder concerns.     History obtained from: patient    Review of Systems   Constitutional:  Negative for chills and fever.   Gastrointestinal:  Negative for constipation.   Genitourinary:  Negative for difficulty urinating, vaginal bleeding, vaginal discharge and vaginal pain.     Medications Ordered Prior to Encounter[1]      Objective   Ht 5' 6\" (1.676 m)   Wt 83.5 kg (184 lb)   LMP 2025 (Exact Date)   BMI 29.70 kg/m²      Physical Exam  Vitals reviewed.   Constitutional:       Appearance: Normal appearance. She is not ill-appearing.   Genitourinary:     Comments: Packing removed from cyst  No erythema  Suture still present    Neurological:      Mental Status: She is alert.                [1]   Current Outpatient Medications on File Prior to Visit   Medication Sig Dispense Refill    calcium carbonate (OS-WILLEM) 600 MG tablet Take 600 mg by mouth in the morning and 600 mg in the evening. Take with meals.      cetirizine (ZyrTEC) 10 mg tablet Take 10 mg by mouth daily as needed      fluticasone (FLONASE) 50 mcg/act nasal spray 1 spray into each nostril daily 16 mL 4    linaCLOtide 145 MCG CAPS Take 1 capsule (145 mcg total) by mouth daily before " breakfast 30 capsule 5    meclizine (ANTIVERT) 25 mg tablet Take 1 tablet (25 mg total) by mouth 3 (three) times a day as needed for dizziness 30 tablet 0    methocarbamol (ROBAXIN) 500 mg tablet Take 1 tablet (500 mg total) by mouth daily at bedtime as needed for muscle spasms 30 tablet 0    Multiple Vitamins-Minerals (PROBIOTICS + BARIATRIC MULTI PO) Take by mouth      polyethylene glycol (GOLYTELY) 4000 mL solution Take 4,000 mL by mouth once for 1 dose 4000 mL 0    Prenatal Vit-Fe Fumarate-FA (PRENATAL VITAMIN PO) Take by mouth       No current facility-administered medications on file prior to visit.

## 2025-05-21 ENCOUNTER — RESULTS FOLLOW-UP (OUTPATIENT)
Age: 35
End: 2025-05-21

## 2025-05-21 ENCOUNTER — APPOINTMENT (OUTPATIENT)
Dept: LAB | Facility: AMBULARY SURGERY CENTER | Age: 35
End: 2025-05-21
Attending: OBSTETRICS & GYNECOLOGY
Payer: COMMERCIAL

## 2025-05-21 DIAGNOSIS — E28.2 PCOS (POLYCYSTIC OVARIAN SYNDROME): ICD-10-CM

## 2025-05-21 LAB — PROGEST SERPL-MCNC: 13.1 NG/ML

## 2025-05-21 PROCEDURE — 84144 ASSAY OF PROGESTERONE: CPT

## 2025-05-21 PROCEDURE — 36415 COLL VENOUS BLD VENIPUNCTURE: CPT

## 2025-05-26 DIAGNOSIS — K58.1 IRRITABLE BOWEL SYNDROME WITH CONSTIPATION: ICD-10-CM

## 2025-06-02 ENCOUNTER — OFFICE VISIT (OUTPATIENT)
Dept: FAMILY MEDICINE CLINIC | Facility: CLINIC | Age: 35
End: 2025-06-02
Payer: COMMERCIAL

## 2025-06-02 VITALS
TEMPERATURE: 97.8 F | BODY MASS INDEX: 29.81 KG/M2 | WEIGHT: 185.5 LBS | HEIGHT: 66 IN | OXYGEN SATURATION: 99 % | SYSTOLIC BLOOD PRESSURE: 118 MMHG | HEART RATE: 81 BPM | DIASTOLIC BLOOD PRESSURE: 78 MMHG

## 2025-06-02 DIAGNOSIS — J01.40 ACUTE NON-RECURRENT PANSINUSITIS: Primary | ICD-10-CM

## 2025-06-02 PROCEDURE — 99214 OFFICE O/P EST MOD 30 MIN: CPT | Performed by: NURSE PRACTITIONER

## 2025-06-02 RX ORDER — FLUCONAZOLE 150 MG/1
150 TABLET ORAL DAILY
Qty: 4 TABLET | Refills: 0 | Status: SHIPPED | OUTPATIENT
Start: 2025-06-02 | End: 2025-06-06

## 2025-06-02 RX ORDER — DEXTROMETHORPHAN HYDROBROMIDE AND PROMETHAZINE HYDROCHLORIDE 15; 6.25 MG/5ML; MG/5ML
5 SYRUP ORAL 4 TIMES DAILY PRN
Qty: 118 ML | Refills: 0 | Status: SHIPPED | OUTPATIENT
Start: 2025-06-02

## 2025-06-02 RX ORDER — CEFUROXIME AXETIL 500 MG/1
500 TABLET ORAL EVERY 12 HOURS SCHEDULED
Qty: 14 TABLET | Refills: 0 | Status: SHIPPED | OUTPATIENT
Start: 2025-06-02 | End: 2025-06-09

## 2025-06-02 NOTE — PROGRESS NOTES
"Name: Elsa Carvajal      : 1990      MRN: [id number]  Encounter Provider: JAVED Vaughn  Encounter Date: 2025   Encounter department: [facility] [place]  :  Assessment & Plan  Acute non-recurrent pansinusitis    Orders:  •  cefuroxime (CEFTIN) 500 mg tablet; Take 1 tablet (500 mg total) by mouth every 12 (twelve) hours for 7 days  •  fluconazole (DIFLUCAN) 150 mg tablet; Take 1 tablet (150 mg total) by mouth daily for 4 doses  •  promethazine-dextromethorphan (PHENERGAN-DM) 6.25-15 mg/5 mL oral syrup; Take 5 mL by mouth 4 (four) times a day as needed for cough           History of Present Illness   35 y.o.female presenting with sore throat, dry throat, cough, headache, ear pressure, fatigue and body aches for the past 4 days.She reports that none of the over counter medications have been working for her.        Review of Systems   Constitutional:  Positive for fatigue.   HENT:  Positive for congestion, ear pain (pressure), sinus pressure and sore throat.    Respiratory:  Positive for cough. Negative for chest tightness, shortness of breath and wheezing.    Cardiovascular: Negative.    Gastrointestinal: Negative.    Musculoskeletal:  Positive for myalgias.   Neurological:  Positive for headaches. Negative for dizziness and light-headedness.       Objective   /78 (BP Location: Right arm, Patient Position: Sitting, Cuff Size: Large)   Pulse 81   Temp 97.8 °F (36.6 °C) (Temporal)   Ht 5' 6\" (1.676 m)   Wt 84.1 kg (185 lb 8 oz)   LMP 2025 (Exact Date)   SpO2 99%   BMI 29.94 kg/m² (Reviewed)     Physical Exam  Vitals reviewed.   Constitutional:       General: She is not in acute distress.     Appearance: She is not ill-appearing.   HENT:      Head: Normocephalic and atraumatic.      Right Ear: Ear canal and external ear normal. Tympanic membrane is erythematous and bulging.      Left Ear: Ear canal and external ear normal. Tympanic membrane is bulging.      Nose: " Nasal tenderness, mucosal edema and congestion present.      Right Sinus: Maxillary sinus tenderness and frontal sinus tenderness present.      Left Sinus: Maxillary sinus tenderness and frontal sinus tenderness present.      Mouth/Throat:      Lips: Pink.      Mouth: Mucous membranes are moist.      Pharynx: Oropharynx is clear. Posterior oropharyngeal erythema present.     Eyes:      Extraocular Movements: Extraocular movements intact.      Conjunctiva/sclera: Conjunctivae normal.      Pupils: Pupils are equal, round, and reactive to light.       Cardiovascular:      Rate and Rhythm: Normal rate and regular rhythm.      Heart sounds: Normal heart sounds.   Pulmonary:      Effort: Pulmonary effort is normal.      Breath sounds: Normal breath sounds.   Abdominal:      General: Abdomen is flat. There is no distension.      Palpations: Abdomen is soft.      Tenderness: There is no abdominal tenderness.     Musculoskeletal:      Cervical back: Neck supple.   Lymphadenopathy:      Cervical: No cervical adenopathy.     Skin:     General: Skin is warm and dry.      Capillary Refill: Capillary refill takes less than 2 seconds.     Neurological:      Mental Status: She is alert and oriented to person, place, and time.     Psychiatric:         Mood and Affect: Mood normal.         Behavior: Behavior normal.

## 2025-06-30 ENCOUNTER — APPOINTMENT (OUTPATIENT)
Dept: LAB | Facility: AMBULARY SURGERY CENTER | Age: 35
End: 2025-06-30
Attending: OBSTETRICS & GYNECOLOGY
Payer: COMMERCIAL

## 2025-06-30 DIAGNOSIS — E28.2 PCOS (POLYCYSTIC OVARIAN SYNDROME): ICD-10-CM

## 2025-06-30 DIAGNOSIS — Z31.41 FERTILITY TESTING: ICD-10-CM

## 2025-06-30 LAB
ABO GROUP BLD: NORMAL
ALBUMIN SERPL BCG-MCNC: 4.5 G/DL (ref 3.5–5)
ALP SERPL-CCNC: 47 U/L (ref 34–104)
ALT SERPL W P-5'-P-CCNC: 12 U/L (ref 7–52)
AST SERPL W P-5'-P-CCNC: 17 U/L (ref 13–39)
BASOPHILS # BLD AUTO: 0.03 THOUSANDS/ÂΜL (ref 0–0.1)
BASOPHILS NFR BLD AUTO: 0 % (ref 0–1)
BILIRUB DIRECT SERPL-MCNC: 0.11 MG/DL (ref 0–0.2)
BILIRUB SERPL-MCNC: 0.54 MG/DL (ref 0.2–1)
BLD GP AB SCN SERPL QL: NEGATIVE
BUN SERPL-MCNC: 11 MG/DL (ref 5–25)
CHOLEST SERPL-MCNC: 194 MG/DL (ref ?–200)
CREAT SERPL-MCNC: 0.69 MG/DL (ref 0.6–1.3)
EOSINOPHIL # BLD AUTO: 0.11 THOUSAND/ÂΜL (ref 0–0.61)
EOSINOPHIL NFR BLD AUTO: 1 % (ref 0–6)
ERYTHROCYTE [DISTWIDTH] IN BLOOD BY AUTOMATED COUNT: 12.1 % (ref 11.6–15.1)
EST. AVERAGE GLUCOSE BLD GHB EST-MCNC: 100 MG/DL
GFR SERPL CREATININE-BSD FRML MDRD: 113 ML/MIN/1.73SQ M
GLUCOSE P FAST SERPL-MCNC: 78 MG/DL (ref 65–99)
HBA1C MFR BLD: 5.1 %
HBV SURFACE AG SER QL: NORMAL
HCT VFR BLD AUTO: 39.7 % (ref 34.8–46.1)
HDLC SERPL-MCNC: 68 MG/DL
HGB BLD-MCNC: 13.5 G/DL (ref 11.5–15.4)
HIV 1+2 AB+HIV1 P24 AG SERPL QL IA: NORMAL
IMM GRANULOCYTES # BLD AUTO: 0.03 THOUSAND/UL (ref 0–0.2)
IMM GRANULOCYTES NFR BLD AUTO: 0 % (ref 0–2)
INSULIN SERPL-ACNC: 6.01 UIU/ML (ref 1.9–23)
LDLC SERPL CALC-MCNC: 104 MG/DL (ref 0–100)
LYMPHOCYTES # BLD AUTO: 1.21 THOUSANDS/ÂΜL (ref 0.6–4.47)
LYMPHOCYTES NFR BLD AUTO: 13 % (ref 14–44)
MCH RBC QN AUTO: 31.4 PG (ref 26.8–34.3)
MCHC RBC AUTO-ENTMCNC: 34 G/DL (ref 31.4–37.4)
MCV RBC AUTO: 92 FL (ref 82–98)
MONOCYTES # BLD AUTO: 0.65 THOUSAND/ÂΜL (ref 0.17–1.22)
MONOCYTES NFR BLD AUTO: 7 % (ref 4–12)
NEUTROPHILS # BLD AUTO: 7.55 THOUSANDS/ÂΜL (ref 1.85–7.62)
NEUTS SEG NFR BLD AUTO: 79 % (ref 43–75)
NONHDLC SERPL-MCNC: 126 MG/DL
NRBC BLD AUTO-RTO: 0 /100 WBCS
PLATELET # BLD AUTO: 150 THOUSANDS/UL (ref 149–390)
PMV BLD AUTO: 10.8 FL (ref 8.9–12.7)
PROLACTIN SERPL-MCNC: 15.32 NG/ML (ref 3.34–26.72)
PROT SERPL-MCNC: 7.2 G/DL (ref 6.4–8.4)
RBC # BLD AUTO: 4.3 MILLION/UL (ref 3.81–5.12)
RH BLD: NEGATIVE
RUBV IGG SERPL IA-ACNC: 75.3 IU/ML
SPECIMEN EXPIRATION DATE: NORMAL
T4 FREE SERPL-MCNC: 0.67 NG/DL (ref 0.61–1.12)
TREPONEMA PALLIDUM IGG+IGM AB [PRESENCE] IN SERUM OR PLASMA BY IMMUNOASSAY: NORMAL
TRIGL SERPL-MCNC: 109 MG/DL (ref ?–150)
TSH SERPL DL<=0.05 MIU/L-ACNC: 1.92 UIU/ML (ref 0.45–4.5)
WBC # BLD AUTO: 9.58 THOUSAND/UL (ref 4.31–10.16)

## 2025-06-30 PROCEDURE — 86900 BLOOD TYPING SEROLOGIC ABO: CPT

## 2025-06-30 PROCEDURE — 84403 ASSAY OF TOTAL TESTOSTERONE: CPT

## 2025-06-30 PROCEDURE — 82166 ASSAY ANTI-MULLERIAN HORM: CPT

## 2025-06-30 PROCEDURE — 87522 HEPATITIS C REVRS TRNSCRPJ: CPT

## 2025-06-30 PROCEDURE — 84520 ASSAY OF UREA NITROGEN: CPT

## 2025-06-30 PROCEDURE — 36415 COLL VENOUS BLD VENIPUNCTURE: CPT

## 2025-06-30 PROCEDURE — 83525 ASSAY OF INSULIN: CPT

## 2025-06-30 PROCEDURE — 87389 HIV-1 AG W/HIV-1&-2 AB AG IA: CPT

## 2025-06-30 PROCEDURE — 87591 N.GONORRHOEAE DNA AMP PROB: CPT

## 2025-06-30 PROCEDURE — 86765 RUBEOLA ANTIBODY: CPT

## 2025-06-30 PROCEDURE — 87340 HEPATITIS B SURFACE AG IA: CPT

## 2025-06-30 PROCEDURE — 87491 CHLMYD TRACH DNA AMP PROBE: CPT

## 2025-06-30 PROCEDURE — 80076 HEPATIC FUNCTION PANEL: CPT

## 2025-06-30 PROCEDURE — 80061 LIPID PANEL: CPT

## 2025-06-30 PROCEDURE — 84402 ASSAY OF FREE TESTOSTERONE: CPT

## 2025-06-30 PROCEDURE — 84146 ASSAY OF PROLACTIN: CPT

## 2025-06-30 PROCEDURE — 84443 ASSAY THYROID STIM HORMONE: CPT

## 2025-06-30 PROCEDURE — 82947 ASSAY GLUCOSE BLOOD QUANT: CPT

## 2025-06-30 PROCEDURE — 85025 COMPLETE CBC W/AUTO DIFF WBC: CPT

## 2025-06-30 PROCEDURE — 84439 ASSAY OF FREE THYROXINE: CPT

## 2025-06-30 PROCEDURE — 86762 RUBELLA ANTIBODY: CPT

## 2025-06-30 PROCEDURE — 83498 ASY HYDROXYPROGESTERONE 17-D: CPT

## 2025-06-30 PROCEDURE — 83036 HEMOGLOBIN GLYCOSYLATED A1C: CPT

## 2025-06-30 PROCEDURE — 86780 TREPONEMA PALLIDUM: CPT

## 2025-06-30 PROCEDURE — 82565 ASSAY OF CREATININE: CPT

## 2025-06-30 PROCEDURE — 86850 RBC ANTIBODY SCREEN: CPT

## 2025-06-30 PROCEDURE — 82627 DEHYDROEPIANDROSTERONE: CPT

## 2025-06-30 PROCEDURE — 86901 BLOOD TYPING SEROLOGIC RH(D): CPT

## 2025-07-01 LAB
C TRACH DNA SPEC QL NAA+PROBE: NEGATIVE
DHEA-S SERPL-MCNC: 326 UG/DL (ref 84.8–378)
N GONORRHOEA DNA SPEC QL NAA+PROBE: NEGATIVE
TESTOST FREE SERPL-MCNC: 1.8 PG/ML (ref 0–4.2)
TESTOST SERPL-MCNC: 25 NG/DL (ref 8–60)

## 2025-07-02 LAB — HCV RNA SERPL NAA+PROBE-ACNC: NOT DETECTED K[IU]/ML

## 2025-07-03 LAB — 17OHP SERPL-MCNC: 24 NG/DL

## 2025-07-07 LAB — MIS SERPL-MCNC: 3.61 NG/ML

## 2025-07-09 LAB — MEV IGG SER IA-ACNC: NORMAL

## 2025-07-18 ENCOUNTER — OFFICE VISIT (OUTPATIENT)
Dept: BARIATRICS | Facility: CLINIC | Age: 35
End: 2025-07-18
Payer: COMMERCIAL

## 2025-07-18 VITALS
HEIGHT: 67 IN | DIASTOLIC BLOOD PRESSURE: 80 MMHG | TEMPERATURE: 97.8 F | HEART RATE: 89 BPM | SYSTOLIC BLOOD PRESSURE: 112 MMHG | WEIGHT: 182.5 LBS | BODY MASS INDEX: 28.64 KG/M2 | OXYGEN SATURATION: 99 %

## 2025-07-18 DIAGNOSIS — K21.9 GERD (GASTROESOPHAGEAL REFLUX DISEASE): ICD-10-CM

## 2025-07-18 DIAGNOSIS — Z98.84 BARIATRIC SURGERY STATUS: ICD-10-CM

## 2025-07-18 DIAGNOSIS — Z48.815 ENCOUNTER FOR SURGICAL AFTERCARE FOLLOWING SURGERY OF DIGESTIVE SYSTEM: Primary | ICD-10-CM

## 2025-07-18 DIAGNOSIS — K91.2 POSTSURGICAL MALABSORPTION: ICD-10-CM

## 2025-07-18 PROCEDURE — 99214 OFFICE O/P EST MOD 30 MIN: CPT | Performed by: NURSE PRACTITIONER

## 2025-07-18 RX ORDER — OMEPRAZOLE 20 MG/1
20 CAPSULE, DELAYED RELEASE ORAL DAILY
Qty: 90 CAPSULE | Refills: 0 | Status: SHIPPED | OUTPATIENT
Start: 2025-07-18

## 2025-07-18 NOTE — PROGRESS NOTES
Date of surgery: 7/11/2023  Procedure: Sleeve gastrectomy  Performing surgeon: Dr. Chirag Pimentel    Initial Weight - 255.5 lbs.  Current Weight - 182.5 lbs.  Emiliano Weight - 180.0 lbs.  Total Body Weight Loss (EWL) - 73.0 lbs.  EWL% - 74%  TBW% - 29%    For *NEW/TRANSFER* patients only: Who referred the patient? Please provide name and specialty (PCP, GI, etc.).

## 2025-07-18 NOTE — PROGRESS NOTES
Assessment/Plan:     Patient ID: Elsa Carvajal is a 35 y.o. female.     Bariatric Surgery Status/BMI 29/GERD    -s/p Vertical Sleeve Gastrectomy with Dr. Chirag Pimentel on 07/2023. Presents to the office today for annual visit. Overall doing well except until recently started experiencing GERD symptoms again. She was able to stop omeprazole when recommended until a few months ago. Seems to occur with everything she eats or drinks and occurs a few times per week. Of note, a few months ago was struggling with sinus infection and Bartholin cysts requiring frequent use of abx and steroids. Other than this, she denies use of NSAIDs, ETOH, smoking. Uses 1 cup of coffee in the morning.     Of note, she is undergoign fertility treatment and seeing specialists. She would liek totry to lose further weight before starting any hormone therapy. She will be restarting physical activity to start.     PLAN:     - recommended to start on omeprazole once daily for 2 months and re-evaluate need of UGI/EGD. GERD precautions provided.   - recommended to see RD for post op support.   - Routine follow up in 1 year for annual visit. Follow up in 3 months, prior to coming in it was recommended to taper off omeprazole to see her response and if heartburn reoccurs.   - Continue with healthy lifestyle, adequate protein intake of 60 gm, fluid intake of at least 64 oz.   - Continue with MVI daily.   - Activity as tolerated.   - Labs ordered and will adjust accordingly if any deficiency.   - Follow up with RD and SW as needed.       Continued/Maintain healthy weight loss with good nutrition intakes.  Adequate hydration with at least 64oz. fluid intake.  Follow diet as discussed.  Follow vitamin and mineral recommendations as reviewed with you.  Exercise as tolerated.    Colonoscopy referral made: n/a  Mammogram - N/A  EGD screening - due next year    Follow-up in 3 months. We kindly ask that your arrive 15 minutes before your scheduled appointment  time with your provider to allow our staff to room you, get your vital signs and update your chart.    Get lab work done prior to annual visit. Please call the office if you need a script.  It is recommended to check with your insurance BEFORE getting labs done to make sure they are covered by your policy.      Call our office if you have any problems with abdominal pain especially associated with fever, chills, nausea, vomiting or any other concerns.    All  Post-bariatric surgery patients should be aware that very small quantities of any alcohol can cause impairment and it is very possible not to feel the effect. The effect can be in the system for several hours.  It is also a stomach irritant.     It is advised to AVOID alcohol, Nonsteroidal antiinflammatory drugs (NSAIDS) and nicotine of all forms . Any of these can cause stomach irritation/pain.    Discussed the effects of alcohol on a bariatric patient and the increased impairment risk.     Keep up the good work!     Postsurgical Malabsorption   -At risk for malabsorption of vitamins/minerals secondary to malabsorption and restriction of intake from bariatric surgery  -Currently taking adequate postop bariatric surgery vitamin supplementation  -Last set of bariatric labs completed on 09/2024 and showed WNL   -Next set of bariatric labs ordered for approximately 2 weeks  -Patient received education about the importance of adhering to a lifelong supplementation regimen to avoid vitamin/mineral deficiencies      Diagnoses and all orders for this visit:    Encounter for surgical aftercare following surgery of digestive system  -     Comprehensive metabolic panel  -     Folate  -     Iron Panel (Includes Ferritin, Iron Sat%, Iron, and TIBC)  -     PTH, intact  -     Vitamin A  -     Vitamin B1, whole blood  -     Vitamin B12  -     Vitamin D 25 hydroxy  -     Zinc    Bariatric surgery status  -     Comprehensive metabolic panel  -     Folate  -     Iron Panel  (Includes Ferritin, Iron Sat%, Iron, and TIBC)  -     PTH, intact  -     Vitamin A  -     Vitamin B1, whole blood  -     Vitamin B12  -     Vitamin D 25 hydroxy  -     Zinc    Postsurgical malabsorption  -     Comprehensive metabolic panel  -     Folate  -     Iron Panel (Includes Ferritin, Iron Sat%, Iron, and TIBC)  -     PTH, intact  -     Vitamin A  -     Vitamin B1, whole blood  -     Vitamin B12  -     Vitamin D 25 hydroxy  -     Zinc    BMI 29.0-29.9,adult  -     Comprehensive metabolic panel  -     Folate  -     Iron Panel (Includes Ferritin, Iron Sat%, Iron, and TIBC)  -     PTH, intact  -     Vitamin A  -     Vitamin B1, whole blood  -     Vitamin B12  -     Vitamin D 25 hydroxy  -     Zinc    GERD (gastroesophageal reflux disease)  -     omeprazole (PriLOSEC) 20 mg delayed release capsule; Take 1 capsule (20 mg total) by mouth daily    Other orders  -     Coenzyme Q10 (CO Q 10 PO); Take by mouth  -     VITAMIN D PO; Take by mouth         Subjective:      Patient ID: Elsa Carvajal is a 35 y.o. female.    -s/p Vertical Sleeve Gastrectomy with Dr. Chirag Pimentel on 07/2023. Presents to the office today for annual visit. Overall doing well except until recently started experiencing GERD symptoms again. She was able to stop omeprazole when recommended until a few months ago. Seems to occur with everything she eats or drinks and occurs a few times per week. Of note, a few months ago was struggling with sinus infection and Bartholin cysts requiring frequent use of abx and steroids. Other than this, she denies use of NSAIDs, ETOH, smoking. Uses 1 cup of coffee in the morning    Initial: 255.5 LBS   Current: 182.5 lbs   EWL: (Weight loss is ahead of schedule at this post surgical period.)  Emiliano 180 lbs   Current BMI is Body mass index is 29.01 kg/m².    Tolerating a regular diet-yes  Eating at least 60 grams of protein per day-yes  Following 30/60 minute rule with liquids-yes  Drinking at least 64 ounces of fluid  "per day-yes  Drinking carbonated beverages- YES - poppi   Sufficient exercise-was doing cardio and strength training.   Using NSAIDs regularly-no  Using nicotine-no  Using alcohol-RARELY   Supplements: Bariatric prenatals Multivitamins, Iron, and Calcium  (45 mg of iron)  + probiotic + Co-Q enzyme BID and vitamin d3 2000 IU daily     EWL is 74%, which places the patient ahead of schedule for expected post surgical weight loss at this time.     The following portions of the patient's history were reviewed and updated as appropriate: allergies, current medications, past family history, past medical history, past social history, past surgical history and problem list.    Review of Systems   Constitutional:  Positive for appetite change and unexpected weight change.   Respiratory: Negative.     Cardiovascular: Negative.    Gastrointestinal:         GERD     Musculoskeletal: Negative.    Neurological: Negative.    Psychiatric/Behavioral: Negative.           Objective:    /80 (BP Location: Left arm, Patient Position: Sitting, Cuff Size: Large)   Pulse 89   Temp 97.8 °F (36.6 °C) (Tympanic)   Ht 5' 6.5\" (1.689 m)   Wt 82.8 kg (182 lb 8 oz)   SpO2 99%   BMI 29.01 kg/m²      Physical Exam  Vitals and nursing note reviewed.   Constitutional:       Appearance: Normal appearance. She is obese.     Cardiovascular:      Rate and Rhythm: Normal rate and regular rhythm.      Pulses: Normal pulses.      Heart sounds: Normal heart sounds.   Pulmonary:      Effort: Pulmonary effort is normal.      Breath sounds: Normal breath sounds.   Abdominal:      General: Bowel sounds are normal.      Palpations: Abdomen is soft.      Tenderness: There is no abdominal tenderness.     Musculoskeletal:         General: Normal range of motion.     Skin:     General: Skin is warm and dry.     Neurological:      General: No focal deficit present.      Mental Status: She is alert and oriented to person, place, and time.     Psychiatric:    "      Mood and Affect: Mood normal.         Behavior: Behavior normal.         Thought Content: Thought content normal.         Judgment: Judgment normal.

## 2025-07-23 ENCOUNTER — APPOINTMENT (OUTPATIENT)
Dept: LAB | Facility: AMBULARY SURGERY CENTER | Age: 35
End: 2025-07-23
Payer: COMMERCIAL

## 2025-07-23 LAB
25(OH)D3 SERPL-MCNC: 62.8 NG/ML (ref 30–100)
ALBUMIN SERPL BCG-MCNC: 4.3 G/DL (ref 3.5–5)
ALP SERPL-CCNC: 43 U/L (ref 34–104)
ALT SERPL W P-5'-P-CCNC: 9 U/L (ref 7–52)
ANION GAP SERPL CALCULATED.3IONS-SCNC: 8 MMOL/L (ref 4–13)
AST SERPL W P-5'-P-CCNC: 13 U/L (ref 13–39)
BILIRUB SERPL-MCNC: 0.61 MG/DL (ref 0.2–1)
BUN SERPL-MCNC: 14 MG/DL (ref 5–25)
CALCIUM SERPL-MCNC: 9.5 MG/DL (ref 8.4–10.2)
CHLORIDE SERPL-SCNC: 104 MMOL/L (ref 96–108)
CO2 SERPL-SCNC: 25 MMOL/L (ref 21–32)
CREAT SERPL-MCNC: 0.66 MG/DL (ref 0.6–1.3)
FERRITIN SERPL-MCNC: 77 NG/ML (ref 30–307)
FOLATE SERPL-MCNC: >22.3 NG/ML
GFR SERPL CREATININE-BSD FRML MDRD: 115 ML/MIN/1.73SQ M
GLUCOSE P FAST SERPL-MCNC: 95 MG/DL (ref 65–99)
IRON SATN MFR SERPL: 39 % (ref 15–50)
IRON SERPL-MCNC: 135 UG/DL (ref 50–212)
POTASSIUM SERPL-SCNC: 4 MMOL/L (ref 3.5–5.3)
PROT SERPL-MCNC: 7.1 G/DL (ref 6.4–8.4)
PTH-INTACT SERPL-MCNC: 32.1 PG/ML (ref 12–88)
SODIUM SERPL-SCNC: 137 MMOL/L (ref 135–147)
TIBC SERPL-MCNC: 343 UG/DL (ref 250–450)
TRANSFERRIN SERPL-MCNC: 245 MG/DL (ref 203–362)
UIBC SERPL-MCNC: 208 UG/DL (ref 155–355)
VIT B12 SERPL-MCNC: 873 PG/ML (ref 180–914)

## 2025-07-23 PROCEDURE — 82607 VITAMIN B-12: CPT | Performed by: NURSE PRACTITIONER

## 2025-07-23 PROCEDURE — 80053 COMPREHEN METABOLIC PANEL: CPT | Performed by: NURSE PRACTITIONER

## 2025-07-23 PROCEDURE — 82306 VITAMIN D 25 HYDROXY: CPT | Performed by: NURSE PRACTITIONER

## 2025-07-23 PROCEDURE — 83540 ASSAY OF IRON: CPT | Performed by: NURSE PRACTITIONER

## 2025-07-23 PROCEDURE — 82728 ASSAY OF FERRITIN: CPT | Performed by: NURSE PRACTITIONER

## 2025-07-23 PROCEDURE — 36415 COLL VENOUS BLD VENIPUNCTURE: CPT | Performed by: NURSE PRACTITIONER

## 2025-07-23 PROCEDURE — 83970 ASSAY OF PARATHORMONE: CPT | Performed by: NURSE PRACTITIONER

## 2025-07-23 PROCEDURE — 83550 IRON BINDING TEST: CPT | Performed by: NURSE PRACTITIONER

## 2025-07-23 PROCEDURE — 82746 ASSAY OF FOLIC ACID SERUM: CPT | Performed by: NURSE PRACTITIONER

## 2025-07-23 PROCEDURE — 84630 ASSAY OF ZINC: CPT | Performed by: NURSE PRACTITIONER

## 2025-07-23 PROCEDURE — 84590 ASSAY OF VITAMIN A: CPT | Performed by: NURSE PRACTITIONER

## 2025-07-23 PROCEDURE — 84425 ASSAY OF VITAMIN B-1: CPT | Performed by: NURSE PRACTITIONER

## 2025-07-25 LAB — ZINC SERPL-MCNC: 72 UG/DL (ref 44–115)

## 2025-07-27 LAB
VIT A SERPL-MCNC: 49.3 UG/DL (ref 18.9–57.3)
VIT B1 BLD-SCNC: 106.6 NMOL/L (ref 66.5–200)

## 2025-08-06 ENCOUNTER — TELEPHONE (OUTPATIENT)
Dept: BARIATRICS | Facility: CLINIC | Age: 35
End: 2025-08-06

## 2025-08-20 DIAGNOSIS — K58.1 IRRITABLE BOWEL SYNDROME WITH CONSTIPATION: ICD-10-CM

## (undated) DEVICE — PREMIUM DRY TRAY LF: Brand: MEDLINE INDUSTRIES, INC.

## (undated) DEVICE — SUT VICRYL 3-0 SH 27 IN J416H

## (undated) DEVICE — STRL ALLENTOWN HYSTEROSCOPY PK: Brand: CARDINAL HEALTH

## (undated) DEVICE — VIOLET BRAIDED (POLYGLACTIN 910), SYNTHETIC ABSORBABLE SUTURE: Brand: COATED VICRYL

## (undated) DEVICE — DRAPE EQUIPMENT RF WAND

## (undated) DEVICE — VESSEL SEALER EXTEND: Brand: ENDOWRIST

## (undated) DEVICE — CHLORHEXIDINE 4PCT 4 OZ

## (undated) DEVICE — STAPLER 60 RELOAD BLUE: Brand: SUREFORM

## (undated) DEVICE — REDUCER: Brand: ENDOWRIST

## (undated) DEVICE — 1820 FOAM BLOCK NEEDLE COUNTER: Brand: DEVON

## (undated) DEVICE — DISPOSABLE OR TOWEL: Brand: CARDINAL HEALTH

## (undated) DEVICE — GLOVE PI ULTRA TOUCH SZ.7.0

## (undated) DEVICE — CANNULA SEAL

## (undated) DEVICE — STAPLER 60 RELOAD WHITE: Brand: SUREFORM

## (undated) DEVICE — TROCAR: Brand: KII FIOS FIRST ENTRY

## (undated) DEVICE — NEEDLE 25GA X 1 IN SAFETY GLIDE

## (undated) DEVICE — KIT, ROBOTIC BARIATRIC: Brand: CARDINAL HEALTH

## (undated) DEVICE — CURITY PLAIN PACKING STRIP: Brand: CURITY

## (undated) DEVICE — STAPLER 60: Brand: SUREFORM

## (undated) DEVICE — ADHESIVE SKIN CLSR DERMABOND NX

## (undated) DEVICE — VISIGI 3D®  CALIBRATION SYSTEM  SIZE 36FR SLEEVE/STD: Brand: BOEHRINGER® VISIGI 3D™ SLEEVE GASTRECTOMY CALIBRATION SYSTEM, SIZE 36FR

## (undated) DEVICE — ABSORBABLE WOUND CLOSURE DEVICE: Brand: V-LOC 90

## (undated) DEVICE — GLOVE INDICATOR PI UNDERGLOVE SZ 7 BLUE

## (undated) DEVICE — PLUMEPEN PRO 10FT

## (undated) DEVICE — STAPLER CANNULA SEAL: Brand: ENDOWRIST

## (undated) DEVICE — ARM DRAPE

## (undated) DEVICE — ASTOUND STANDARD SURGICAL GOWN, XL: Brand: CONVERTORS

## (undated) DEVICE — SYRINGE 10ML LL

## (undated) DEVICE — SUT VICRYL 4-0 PS-2 27 IN J426H

## (undated) DEVICE — PAD SANITARY

## (undated) DEVICE — SCD SEQUENTIAL COMPRESSION COMFORT SLEEVE MEDIUM KNEE LENGTH: Brand: KENDALL SCD

## (undated) DEVICE — BLADELESS OBTURATOR: Brand: WECK VISTA

## (undated) DEVICE — GLOVE SRG BIOGEL 7.5

## (undated) DEVICE — MAT ABSORBANT ARTHROSCOPY FLOOR 46 X 40 IN

## (undated) DEVICE — COLUMN DRAPE

## (undated) DEVICE — SUT MONOCRYL 4-0 PS-2 27 IN Y426H

## (undated) DEVICE — CADIERE FORCEPS: Brand: ENDOWRIST

## (undated) DEVICE — INTENDED FOR TISSUE SEPARATION, AND OTHER PROCEDURES THAT REQUIRE A SHARP SURGICAL BLADE TO PUNCTURE OR CUT.: Brand: BARD-PARKER SAFETY BLADES SIZE 11, STERILE

## (undated) DEVICE — DECANTER: Brand: UNBRANDED

## (undated) DEVICE — BETHLEHEM UNIVERSAL MINOR VAG: Brand: CARDINAL HEALTH

## (undated) DEVICE — STRL COTTON TIP APPLCTR 6IN PK: Brand: CARDINAL HEALTH

## (undated) DEVICE — MEDI-VAC YANKAUER SUCTION HANDLE W/STRAIGHT TIP & CONTROL VENT: Brand: CARDINAL HEALTH

## (undated) DEVICE — TUBE SET SMOKE EVAC PNEUMOCLEAR HIGH FLOW

## (undated) DEVICE — MEGA SUTURECUT ND: Brand: ENDOWRIST